# Patient Record
Sex: FEMALE | Race: WHITE | Employment: PART TIME | URBAN - METROPOLITAN AREA
[De-identification: names, ages, dates, MRNs, and addresses within clinical notes are randomized per-mention and may not be internally consistent; named-entity substitution may affect disease eponyms.]

---

## 2017-01-01 ENCOUNTER — DOCUMENTATION ONLY (OUTPATIENT)
Dept: CARE COORDINATION | Facility: CLINIC | Age: 72
End: 2017-01-01

## 2017-01-01 ENCOUNTER — ANTICOAGULATION THERAPY VISIT (OUTPATIENT)
Dept: ANTICOAGULATION | Facility: CLINIC | Age: 72
End: 2017-01-01

## 2017-01-01 ENCOUNTER — RADIANT APPOINTMENT (OUTPATIENT)
Dept: CARDIOLOGY | Facility: CLINIC | Age: 72
End: 2017-01-01

## 2017-01-01 ENCOUNTER — PRE VISIT (OUTPATIENT)
Dept: CARDIOLOGY | Facility: CLINIC | Age: 72
End: 2017-01-01

## 2017-01-01 ENCOUNTER — APPOINTMENT (OUTPATIENT)
Dept: GENERAL RADIOLOGY | Facility: CLINIC | Age: 72
DRG: 207 | End: 2017-01-01
Attending: FAMILY MEDICINE
Payer: MEDICARE

## 2017-01-01 ENCOUNTER — APPOINTMENT (OUTPATIENT)
Dept: GENERAL RADIOLOGY | Facility: CLINIC | Age: 72
DRG: 207 | End: 2017-01-01
Payer: MEDICARE

## 2017-01-01 ENCOUNTER — TELEPHONE (OUTPATIENT)
Dept: TRANSPLANT | Facility: CLINIC | Age: 72
End: 2017-01-01

## 2017-01-01 ENCOUNTER — RESULTS ONLY (OUTPATIENT)
Dept: OTHER | Facility: CLINIC | Age: 72
End: 2017-01-01

## 2017-01-01 ENCOUNTER — DOCUMENTATION ONLY (OUTPATIENT)
Dept: TRANSPLANT | Facility: CLINIC | Age: 72
End: 2017-01-01

## 2017-01-01 ENCOUNTER — APPOINTMENT (OUTPATIENT)
Dept: CT IMAGING | Facility: CLINIC | Age: 72
DRG: 308 | End: 2017-01-01
Attending: INTERNAL MEDICINE
Payer: MEDICARE

## 2017-01-01 ENCOUNTER — APPOINTMENT (OUTPATIENT)
Dept: ULTRASOUND IMAGING | Facility: CLINIC | Age: 72
DRG: 308 | End: 2017-01-01
Attending: PHYSICIAN ASSISTANT
Payer: MEDICARE

## 2017-01-01 ENCOUNTER — APPOINTMENT (OUTPATIENT)
Dept: CARDIOLOGY | Facility: CLINIC | Age: 72
End: 2017-01-01
Attending: INTERNAL MEDICINE
Payer: MEDICARE

## 2017-01-01 ENCOUNTER — HOSPITAL ENCOUNTER (OUTPATIENT)
Facility: CLINIC | Age: 72
Discharge: HOME | End: 2017-01-11
Attending: INTERNAL MEDICINE | Admitting: INTERNAL MEDICINE
Payer: MEDICARE

## 2017-01-01 ENCOUNTER — DOCUMENTATION ONLY (OUTPATIENT)
Dept: LAB | Facility: CLINIC | Age: 72
End: 2017-01-01

## 2017-01-01 ENCOUNTER — HOSPITAL ENCOUNTER (INPATIENT)
Facility: CLINIC | Age: 72
LOS: 3 days | Discharge: HOME | DRG: 308 | End: 2017-03-01
Attending: EMERGENCY MEDICINE | Admitting: HOSPITALIST
Payer: MEDICARE

## 2017-01-01 ENCOUNTER — ANESTHESIA EVENT (OUTPATIENT)
Dept: SURGERY | Facility: CLINIC | Age: 72
DRG: 308 | End: 2017-01-01
Payer: MEDICARE

## 2017-01-01 ENCOUNTER — ANESTHESIA EVENT (OUTPATIENT)
Dept: INTENSIVE CARE | Facility: CLINIC | Age: 72
DRG: 207 | End: 2017-01-01
Payer: MEDICARE

## 2017-01-01 ENCOUNTER — CARE COORDINATION (OUTPATIENT)
Dept: CARDIOLOGY | Facility: CLINIC | Age: 72
End: 2017-01-01

## 2017-01-01 ENCOUNTER — OFFICE VISIT (OUTPATIENT)
Dept: PULMONOLOGY | Facility: CLINIC | Age: 72
End: 2017-01-01
Attending: INTERNAL MEDICINE
Payer: MEDICARE

## 2017-01-01 ENCOUNTER — APPOINTMENT (OUTPATIENT)
Dept: CARDIOLOGY | Facility: CLINIC | Age: 72
DRG: 207 | End: 2017-01-01
Payer: MEDICARE

## 2017-01-01 ENCOUNTER — APPOINTMENT (OUTPATIENT)
Dept: MEDSURG UNIT | Facility: CLINIC | Age: 72
End: 2017-01-01
Attending: INTERNAL MEDICINE
Payer: MEDICARE

## 2017-01-01 ENCOUNTER — TELEPHONE (OUTPATIENT)
Dept: INTERNAL MEDICINE | Facility: CLINIC | Age: 72
End: 2017-01-01

## 2017-01-01 ENCOUNTER — CARE COORDINATION (OUTPATIENT)
Dept: PULMONOLOGY | Facility: CLINIC | Age: 72
End: 2017-01-01

## 2017-01-01 ENCOUNTER — ALLIED HEALTH/NURSE VISIT (OUTPATIENT)
Dept: NURSING | Facility: CLINIC | Age: 72
End: 2017-01-01
Payer: COMMERCIAL

## 2017-01-01 ENCOUNTER — HOSPITAL ENCOUNTER (INPATIENT)
Facility: CLINIC | Age: 72
LOS: 10 days | DRG: 207 | End: 2017-04-28
Attending: FAMILY MEDICINE | Admitting: INTERNAL MEDICINE
Payer: MEDICARE

## 2017-01-01 ENCOUNTER — APPOINTMENT (OUTPATIENT)
Dept: CARDIOLOGY | Facility: CLINIC | Age: 72
DRG: 308 | End: 2017-01-01
Payer: MEDICARE

## 2017-01-01 ENCOUNTER — APPOINTMENT (OUTPATIENT)
Dept: CARDIOLOGY | Facility: CLINIC | Age: 72
DRG: 308 | End: 2017-01-01
Attending: PHYSICIAN ASSISTANT
Payer: MEDICARE

## 2017-01-01 ENCOUNTER — TELEPHONE (OUTPATIENT)
Dept: NURSING | Facility: CLINIC | Age: 72
End: 2017-01-01

## 2017-01-01 ENCOUNTER — TELEPHONE (OUTPATIENT)
Dept: FAMILY MEDICINE | Facility: CLINIC | Age: 72
End: 2017-01-01

## 2017-01-01 ENCOUNTER — ANESTHESIA (OUTPATIENT)
Dept: INTENSIVE CARE | Facility: CLINIC | Age: 72
DRG: 207 | End: 2017-01-01
Payer: MEDICARE

## 2017-01-01 ENCOUNTER — APPOINTMENT (OUTPATIENT)
Dept: GENERAL RADIOLOGY | Facility: CLINIC | Age: 72
DRG: 308 | End: 2017-01-01
Attending: EMERGENCY MEDICINE
Payer: MEDICARE

## 2017-01-01 ENCOUNTER — APPOINTMENT (OUTPATIENT)
Dept: GENERAL RADIOLOGY | Facility: CLINIC | Age: 72
DRG: 207 | End: 2017-01-01
Attending: INTERNAL MEDICINE
Payer: MEDICARE

## 2017-01-01 ENCOUNTER — ANESTHESIA (OUTPATIENT)
Dept: SURGERY | Facility: CLINIC | Age: 72
DRG: 308 | End: 2017-01-01
Payer: MEDICARE

## 2017-01-01 VITALS
HEIGHT: 70 IN | OXYGEN SATURATION: 100 % | TEMPERATURE: 97.8 F | HEART RATE: 73 BPM | RESPIRATION RATE: 16 BRPM | DIASTOLIC BLOOD PRESSURE: 79 MMHG | BODY MASS INDEX: 28.77 KG/M2 | WEIGHT: 201 LBS | SYSTOLIC BLOOD PRESSURE: 120 MMHG

## 2017-01-01 VITALS
SYSTOLIC BLOOD PRESSURE: 97 MMHG | DIASTOLIC BLOOD PRESSURE: 68 MMHG | RESPIRATION RATE: 16 BRPM | WEIGHT: 198 LBS | OXYGEN SATURATION: 100 % | HEIGHT: 69 IN | HEART RATE: 80 BPM | BODY MASS INDEX: 29.33 KG/M2

## 2017-01-01 VITALS
WEIGHT: 200 LBS | OXYGEN SATURATION: 95 % | SYSTOLIC BLOOD PRESSURE: 87 MMHG | HEIGHT: 69 IN | BODY MASS INDEX: 29.62 KG/M2 | HEART RATE: 91 BPM | DIASTOLIC BLOOD PRESSURE: 61 MMHG

## 2017-01-01 VITALS
OXYGEN SATURATION: 97 % | BODY MASS INDEX: 31.03 KG/M2 | HEART RATE: 70 BPM | WEIGHT: 209.5 LBS | SYSTOLIC BLOOD PRESSURE: 94 MMHG | DIASTOLIC BLOOD PRESSURE: 58 MMHG | TEMPERATURE: 97.7 F | RESPIRATION RATE: 16 BRPM | HEIGHT: 69 IN

## 2017-01-01 VITALS
BODY MASS INDEX: 28.77 KG/M2 | DIASTOLIC BLOOD PRESSURE: 72 MMHG | OXYGEN SATURATION: 98 % | HEART RATE: 58 BPM | SYSTOLIC BLOOD PRESSURE: 116 MMHG | WEIGHT: 201 LBS | HEIGHT: 70 IN

## 2017-01-01 VITALS — HEART RATE: 118 BPM | OXYGEN SATURATION: 70 %

## 2017-01-01 VITALS
BODY MASS INDEX: 29.71 KG/M2 | DIASTOLIC BLOOD PRESSURE: 65 MMHG | SYSTOLIC BLOOD PRESSURE: 91 MMHG | HEIGHT: 69 IN | RESPIRATION RATE: 15 BRPM | TEMPERATURE: 99.3 F | WEIGHT: 200.62 LBS | OXYGEN SATURATION: 95 %

## 2017-01-01 DIAGNOSIS — Z79.01 LONG-TERM (CURRENT) USE OF ANTICOAGULANTS: ICD-10-CM

## 2017-01-01 DIAGNOSIS — I48.91 ATRIAL FIBRILLATION, UNSPECIFIED TYPE (H): ICD-10-CM

## 2017-01-01 DIAGNOSIS — I27.20 PULMONARY HYPERTENSION (H): ICD-10-CM

## 2017-01-01 DIAGNOSIS — Z79.01 LONG-TERM (CURRENT) USE OF ANTICOAGULANTS: Primary | ICD-10-CM

## 2017-01-01 DIAGNOSIS — I48.91 ATRIAL FIBRILLATION WITH RVR (H): ICD-10-CM

## 2017-01-01 DIAGNOSIS — J84.112 IPF (IDIOPATHIC PULMONARY FIBROSIS) (H): Primary | ICD-10-CM

## 2017-01-01 DIAGNOSIS — I48.0 PAROXYSMAL ATRIAL FIBRILLATION (H): ICD-10-CM

## 2017-01-01 DIAGNOSIS — I50.22 CHRONIC SYSTOLIC HEART FAILURE (H): Primary | ICD-10-CM

## 2017-01-01 DIAGNOSIS — I50.40 COMBINED SYSTOLIC AND DIASTOLIC HEART FAILURE (H): ICD-10-CM

## 2017-01-01 DIAGNOSIS — R09.3 INCREASED SPUTUM PRODUCTION: Primary | ICD-10-CM

## 2017-01-01 DIAGNOSIS — I50.22 CHRONIC SYSTOLIC HEART FAILURE (H): ICD-10-CM

## 2017-01-01 DIAGNOSIS — R09.02 OXYGEN DESATURATION: Primary | ICD-10-CM

## 2017-01-01 DIAGNOSIS — J84.112 IPF (IDIOPATHIC PULMONARY FIBROSIS) (H): ICD-10-CM

## 2017-01-01 DIAGNOSIS — F41.9 ANXIETY: ICD-10-CM

## 2017-01-01 DIAGNOSIS — J84.9 INTERSTITIAL PULMONARY DISEASE, UNSPECIFIED (H): Primary | ICD-10-CM

## 2017-01-01 DIAGNOSIS — Z79.01 ANTICOAGULANT LONG-TERM USE: Chronic | ICD-10-CM

## 2017-01-01 DIAGNOSIS — Z76.82 ORGAN TRANSPLANT CANDIDATE: ICD-10-CM

## 2017-01-01 DIAGNOSIS — Z76.82 AWAITING ORGAN TRANSPLANT STATUS: ICD-10-CM

## 2017-01-01 DIAGNOSIS — J96.01 ACUTE RESPIRATORY FAILURE WITH HYPOXIA (H): Primary | ICD-10-CM

## 2017-01-01 DIAGNOSIS — F41.9 ANXIETY: Primary | ICD-10-CM

## 2017-01-01 DIAGNOSIS — R09.02 HYPOXIA: ICD-10-CM

## 2017-01-01 DIAGNOSIS — I48.91 A-FIB (H): ICD-10-CM

## 2017-01-01 DIAGNOSIS — I48.91 ATRIAL FIBRILLATION, UNSPECIFIED TYPE (H): Primary | ICD-10-CM

## 2017-01-01 DIAGNOSIS — I48.0 PAROXYSMAL ATRIAL FIBRILLATION (H): Primary | ICD-10-CM

## 2017-01-01 DIAGNOSIS — I48.91 ATRIAL FIBRILLATION (H): ICD-10-CM

## 2017-01-01 DIAGNOSIS — R05.9 COUGH: ICD-10-CM

## 2017-01-01 DIAGNOSIS — I50.21 ACUTE SYSTOLIC CONGESTIVE HEART FAILURE (H): ICD-10-CM

## 2017-01-01 DIAGNOSIS — J84.10 PULMONARY FIBROSIS (H): Primary | ICD-10-CM

## 2017-01-01 DIAGNOSIS — I27.20 PULMONARY HYPERTENSION (H): Primary | ICD-10-CM

## 2017-01-01 DIAGNOSIS — R06.02 SOB (SHORTNESS OF BREATH): ICD-10-CM

## 2017-01-01 DIAGNOSIS — R05.9 COUGH: Primary | ICD-10-CM

## 2017-01-01 DIAGNOSIS — J96.90 RESPIRATORY FAILURE (H): ICD-10-CM

## 2017-01-01 DIAGNOSIS — J18.9 PNEUMONIA OF RIGHT LUNG DUE TO INFECTIOUS ORGANISM, UNSPECIFIED PART OF LUNG: ICD-10-CM

## 2017-01-01 DIAGNOSIS — Z99.81 DEPENDENCE ON SUPPLEMENTAL OXYGEN: ICD-10-CM

## 2017-01-01 LAB
ABO + RH BLD: NORMAL
ABO + RH BLD: NORMAL
ALBUMIN SERPL-MCNC: 2.4 G/DL (ref 3.4–5)
ALBUMIN SERPL-MCNC: 2.6 G/DL (ref 3.4–5)
ALBUMIN SERPL-MCNC: 3.1 G/DL (ref 3.4–5)
ALBUMIN SERPL-MCNC: 3.5 G/DL (ref 3.4–5)
ALBUMIN UR-MCNC: 30 MG/DL
ALBUMIN UR-MCNC: NEGATIVE MG/DL
ALP SERPL-CCNC: 55 U/L (ref 40–150)
ALP SERPL-CCNC: 55 U/L (ref 40–150)
ALP SERPL-CCNC: 63 U/L (ref 40–150)
ALP SERPL-CCNC: 73 U/L (ref 40–150)
ALT SERPL W P-5'-P-CCNC: 16 U/L (ref 0–50)
ALT SERPL W P-5'-P-CCNC: 27 U/L (ref 0–50)
ALT SERPL W P-5'-P-CCNC: 32 U/L (ref 0–50)
ALT SERPL W P-5'-P-CCNC: 36 U/L (ref 0–50)
ANION GAP SERPL CALCULATED.3IONS-SCNC: 10 MMOL/L (ref 3–14)
ANION GAP SERPL CALCULATED.3IONS-SCNC: 11 MMOL/L (ref 3–14)
ANION GAP SERPL CALCULATED.3IONS-SCNC: 4 MMOL/L (ref 3–14)
ANION GAP SERPL CALCULATED.3IONS-SCNC: 5 MMOL/L (ref 3–14)
ANION GAP SERPL CALCULATED.3IONS-SCNC: 6 MMOL/L (ref 3–14)
ANION GAP SERPL CALCULATED.3IONS-SCNC: 7 MMOL/L (ref 3–14)
ANION GAP SERPL CALCULATED.3IONS-SCNC: 8 MMOL/L (ref 3–14)
APPEARANCE UR: ABNORMAL
APPEARANCE UR: CLEAR
APTT PPP: 35 SEC (ref 22–37)
AST SERPL W P-5'-P-CCNC: 17 U/L (ref 0–45)
AST SERPL W P-5'-P-CCNC: 19 U/L (ref 0–45)
AST SERPL W P-5'-P-CCNC: 30 U/L (ref 0–45)
AST SERPL W P-5'-P-CCNC: 33 U/L (ref 0–45)
BACTERIA SPEC CULT: NO GROWTH
BACTERIA SPEC CULT: NORMAL
BASE DEFICIT BLDA-SCNC: 0.4 MMOL/L
BASE DEFICIT BLDA-SCNC: 1.6 MMOL/L
BASE DEFICIT BLDV-SCNC: 0.6 MMOL/L
BASE DEFICIT BLDV-SCNC: 1.8 MMOL/L
BASE EXCESS BLDA CALC-SCNC: 0.2 MMOL/L
BASE EXCESS BLDA CALC-SCNC: 0.9 MMOL/L
BASE EXCESS BLDA CALC-SCNC: 2.6 MMOL/L
BASE EXCESS BLDA CALC-SCNC: 3.8 MMOL/L
BASE EXCESS BLDA CALC-SCNC: 4.2 MMOL/L
BASE EXCESS BLDA CALC-SCNC: 5.2 MMOL/L
BASE EXCESS BLDA CALC-SCNC: 5.4 MMOL/L
BASE EXCESS BLDA CALC-SCNC: 5.5 MMOL/L
BASE EXCESS BLDA CALC-SCNC: 6.4 MMOL/L
BASE EXCESS BLDA CALC-SCNC: 6.7 MMOL/L
BASE EXCESS BLDA CALC-SCNC: 7.2 MMOL/L
BASE EXCESS BLDA CALC-SCNC: 7.3 MMOL/L
BASE EXCESS BLDA CALC-SCNC: 7.4 MMOL/L
BASE EXCESS BLDA CALC-SCNC: 9.2 MMOL/L
BASE EXCESS BLDV CALC-SCNC: 2.4 MMOL/L
BASE EXCESS BLDV CALC-SCNC: 4.6 MMOL/L
BASOPHILS # BLD AUTO: 0 10E9/L (ref 0–0.2)
BASOPHILS # BLD AUTO: 0.1 10E9/L (ref 0–0.2)
BASOPHILS NFR BLD AUTO: 0.3 %
BASOPHILS NFR BLD AUTO: 0.4 %
BILIRUB SERPL-MCNC: 0.3 MG/DL (ref 0.2–1.3)
BILIRUB SERPL-MCNC: 0.6 MG/DL (ref 0.2–1.3)
BILIRUB SERPL-MCNC: 1.1 MG/DL (ref 0.2–1.3)
BILIRUB SERPL-MCNC: 2 MG/DL (ref 0.2–1.3)
BILIRUB UR QL STRIP: NEGATIVE
BILIRUB UR QL STRIP: NEGATIVE
BLD GP AB SCN SERPL QL: NORMAL
BLOOD BANK CMNT PATIENT-IMP: NORMAL
BUN SERPL-MCNC: 10 MG/DL (ref 7–30)
BUN SERPL-MCNC: 10 MG/DL (ref 7–30)
BUN SERPL-MCNC: 11 MG/DL (ref 7–30)
BUN SERPL-MCNC: 12 MG/DL (ref 7–30)
BUN SERPL-MCNC: 13 MG/DL (ref 7–30)
BUN SERPL-MCNC: 14 MG/DL (ref 7–30)
BUN SERPL-MCNC: 16 MG/DL (ref 7–30)
BUN SERPL-MCNC: 18 MG/DL (ref 7–30)
BUN SERPL-MCNC: 19 MG/DL (ref 7–30)
BUN SERPL-MCNC: 19 MG/DL (ref 7–30)
BUN SERPL-MCNC: 21 MG/DL (ref 7–30)
BUN SERPL-MCNC: 7 MG/DL (ref 7–30)
BUN SERPL-MCNC: 8 MG/DL (ref 7–30)
BUN SERPL-MCNC: 9 MG/DL (ref 7–30)
BUN SERPL-MCNC: 9 MG/DL (ref 7–30)
CA-I BLD-MCNC: 4.3 MG/DL (ref 4.4–5.2)
CALCIUM SERPL-MCNC: 7.4 MG/DL (ref 8.5–10.1)
CALCIUM SERPL-MCNC: 7.6 MG/DL (ref 8.5–10.1)
CALCIUM SERPL-MCNC: 7.6 MG/DL (ref 8.5–10.1)
CALCIUM SERPL-MCNC: 7.7 MG/DL (ref 8.5–10.1)
CALCIUM SERPL-MCNC: 7.7 MG/DL (ref 8.5–10.1)
CALCIUM SERPL-MCNC: 7.8 MG/DL (ref 8.5–10.1)
CALCIUM SERPL-MCNC: 7.9 MG/DL (ref 8.5–10.1)
CALCIUM SERPL-MCNC: 8 MG/DL (ref 8.5–10.1)
CALCIUM SERPL-MCNC: 8 MG/DL (ref 8.5–10.1)
CALCIUM SERPL-MCNC: 8.1 MG/DL (ref 8.5–10.1)
CALCIUM SERPL-MCNC: 8.2 MG/DL (ref 8.5–10.1)
CALCIUM SERPL-MCNC: 8.5 MG/DL (ref 8.5–10.1)
CALCIUM SERPL-MCNC: 8.6 MG/DL (ref 8.5–10.1)
CALCIUM SERPL-MCNC: 8.9 MG/DL (ref 8.5–10.1)
CALCIUM SERPL-MCNC: 8.9 MG/DL (ref 8.5–10.1)
CALCIUM SERPL-MCNC: 9.2 MG/DL (ref 8.5–10.1)
CHLORIDE SERPL-SCNC: 101 MMOL/L (ref 94–109)
CHLORIDE SERPL-SCNC: 102 MMOL/L (ref 94–109)
CHLORIDE SERPL-SCNC: 102 MMOL/L (ref 94–109)
CHLORIDE SERPL-SCNC: 103 MMOL/L (ref 94–109)
CHLORIDE SERPL-SCNC: 103 MMOL/L (ref 94–109)
CHLORIDE SERPL-SCNC: 104 MMOL/L (ref 94–109)
CHLORIDE SERPL-SCNC: 105 MMOL/L (ref 94–109)
CHLORIDE SERPL-SCNC: 106 MMOL/L (ref 94–109)
CHLORIDE SERPL-SCNC: 107 MMOL/L (ref 94–109)
CHLORIDE SERPL-SCNC: 107 MMOL/L (ref 94–109)
CHLORIDE SERPL-SCNC: 108 MMOL/L (ref 94–109)
CHLORIDE SERPL-SCNC: 109 MMOL/L (ref 94–109)
CHLORIDE SERPL-SCNC: 111 MMOL/L (ref 94–109)
CHOLEST SERPL-MCNC: 158 MG/DL
CO2 BLDCOV-SCNC: 29 MMOL/L (ref 21–28)
CO2 SERPL-SCNC: 23 MMOL/L (ref 20–32)
CO2 SERPL-SCNC: 25 MMOL/L (ref 20–32)
CO2 SERPL-SCNC: 26 MMOL/L (ref 20–32)
CO2 SERPL-SCNC: 26 MMOL/L (ref 20–32)
CO2 SERPL-SCNC: 28 MMOL/L (ref 20–32)
CO2 SERPL-SCNC: 29 MMOL/L (ref 20–32)
CO2 SERPL-SCNC: 30 MMOL/L (ref 20–32)
CO2 SERPL-SCNC: 30 MMOL/L (ref 20–32)
CO2 SERPL-SCNC: 31 MMOL/L (ref 20–32)
CO2 SERPL-SCNC: 31 MMOL/L (ref 20–32)
CO2 SERPL-SCNC: 32 MMOL/L (ref 20–32)
CO2 SERPL-SCNC: 33 MMOL/L (ref 20–32)
CO2 SERPL-SCNC: 33 MMOL/L (ref 20–32)
COLOR UR AUTO: ABNORMAL
COLOR UR AUTO: YELLOW
CREAT SERPL-MCNC: 0.58 MG/DL (ref 0.52–1.04)
CREAT SERPL-MCNC: 0.59 MG/DL (ref 0.52–1.04)
CREAT SERPL-MCNC: 0.66 MG/DL (ref 0.52–1.04)
CREAT SERPL-MCNC: 0.67 MG/DL (ref 0.52–1.04)
CREAT SERPL-MCNC: 0.74 MG/DL (ref 0.52–1.04)
CREAT SERPL-MCNC: 0.76 MG/DL (ref 0.52–1.04)
CREAT SERPL-MCNC: 0.8 MG/DL (ref 0.52–1.04)
CREAT SERPL-MCNC: 0.83 MG/DL (ref 0.52–1.04)
CREAT SERPL-MCNC: 0.86 MG/DL (ref 0.52–1.04)
CREAT SERPL-MCNC: 0.95 MG/DL (ref 0.52–1.04)
CREAT SERPL-MCNC: 0.95 MG/DL (ref 0.52–1.04)
CREAT SERPL-MCNC: 0.96 MG/DL (ref 0.52–1.04)
CREAT SERPL-MCNC: 0.96 MG/DL (ref 0.52–1.04)
CREAT SERPL-MCNC: 1 MG/DL (ref 0.52–1.04)
CREAT SERPL-MCNC: 1.04 MG/DL (ref 0.52–1.04)
CREAT SERPL-MCNC: 1.06 MG/DL (ref 0.52–1.04)
CREAT SERPL-MCNC: 1.06 MG/DL (ref 0.52–1.04)
DIFFERENTIAL METHOD BLD: ABNORMAL
DIFFERENTIAL METHOD BLD: NORMAL
DLCOUNC-%PRED-PRE: 30 %
DLCOUNC-PRE: 6.72 ML/MIN/MMHG
DLCOUNC-PRED: 22.34 ML/MIN/MMHG
EOSINOPHIL # BLD AUTO: 0.2 10E9/L (ref 0–0.7)
EOSINOPHIL # BLD AUTO: 0.2 10E9/L (ref 0–0.7)
EOSINOPHIL NFR BLD AUTO: 2.1 %
EOSINOPHIL NFR BLD AUTO: 2.8 %
ERV-%PRED-PRE: 87 %
ERV-PRE: 0.58 L
ERV-PRED: 0.66 L
ERYTHROCYTE [DISTWIDTH] IN BLOOD BY AUTOMATED COUNT: 12.8 % (ref 10–15)
ERYTHROCYTE [DISTWIDTH] IN BLOOD BY AUTOMATED COUNT: 13.4 % (ref 10–15)
ERYTHROCYTE [DISTWIDTH] IN BLOOD BY AUTOMATED COUNT: 13.5 % (ref 10–15)
ERYTHROCYTE [DISTWIDTH] IN BLOOD BY AUTOMATED COUNT: 13.5 % (ref 10–15)
ERYTHROCYTE [DISTWIDTH] IN BLOOD BY AUTOMATED COUNT: 13.6 % (ref 10–15)
ERYTHROCYTE [DISTWIDTH] IN BLOOD BY AUTOMATED COUNT: 13.7 % (ref 10–15)
ERYTHROCYTE [DISTWIDTH] IN BLOOD BY AUTOMATED COUNT: 14 % (ref 10–15)
ERYTHROCYTE [DISTWIDTH] IN BLOOD BY AUTOMATED COUNT: 14 % (ref 10–15)
ERYTHROCYTE [DISTWIDTH] IN BLOOD BY AUTOMATED COUNT: 14.1 % (ref 10–15)
ERYTHROCYTE [DISTWIDTH] IN BLOOD BY AUTOMATED COUNT: 14.1 % (ref 10–15)
ERYTHROCYTE [DISTWIDTH] IN BLOOD BY AUTOMATED COUNT: 14.2 % (ref 10–15)
ERYTHROCYTE [DISTWIDTH] IN BLOOD BY AUTOMATED COUNT: 14.4 % (ref 10–15)
EXPTIME-PRE: 5.06 SEC
FEF2575-%PRED-PRE: 155 %
FEF2575-PRE: 3.24 L/SEC
FEF2575-PRED: 2.08 L/SEC
FEFMAX-%PRED-PRE: 110 %
FEFMAX-PRE: 7.09 L/SEC
FEFMAX-PRED: 6.39 L/SEC
FEV1-%PRED-PRE: 85 %
FEV1-PRE: 2.19 L
FEV1FEV6-PRE: 90 %
FEV1FEV6-PRED: 79 %
FEV1FVC-PRE: 90 %
FEV1FVC-PRED: 78 %
FEV1SVC-PRE: 92 %
FEV1SVC-PRED: 71 %
FIFMAX-PRE: 4.97 L/SEC
FLUAV H1 2009 PAND RNA SPEC QL NAA+PROBE: NEGATIVE
FLUAV H1 RNA SPEC QL NAA+PROBE: NEGATIVE
FLUAV H3 RNA SPEC QL NAA+PROBE: NEGATIVE
FLUAV RNA SPEC QL NAA+PROBE: NEGATIVE
FLUAV+FLUBV AG SPEC QL: NEGATIVE
FLUAV+FLUBV AG SPEC QL: NORMAL
FLUBV RNA SPEC QL NAA+PROBE: NEGATIVE
FRCPLETH-%PRED-PRE: 68 %
FRCPLETH-PRE: 2.02 L
FRCPLETH-PRED: 2.95 L
FVC-%PRED-PRE: 73 %
FVC-PRE: 2.44 L
FVC-PRED: 3.33 L
GFR SERPL CREATININE-BSD FRML MDRD: 51 ML/MIN/1.7M2
GFR SERPL CREATININE-BSD FRML MDRD: 51 ML/MIN/1.7M2
GFR SERPL CREATININE-BSD FRML MDRD: 53 ML/MIN/1.7M2
GFR SERPL CREATININE-BSD FRML MDRD: 55 ML/MIN/1.7M2
GFR SERPL CREATININE-BSD FRML MDRD: 57 ML/MIN/1.7M2
GFR SERPL CREATININE-BSD FRML MDRD: 57 ML/MIN/1.7M2
GFR SERPL CREATININE-BSD FRML MDRD: 58 ML/MIN/1.7M2
GFR SERPL CREATININE-BSD FRML MDRD: 58 ML/MIN/1.7M2
GFR SERPL CREATININE-BSD FRML MDRD: 65 ML/MIN/1.7M2
GFR SERPL CREATININE-BSD FRML MDRD: 68 ML/MIN/1.7M2
GFR SERPL CREATININE-BSD FRML MDRD: 71 ML/MIN/1.7M2
GFR SERPL CREATININE-BSD FRML MDRD: 75 ML/MIN/1.7M2
GFR SERPL CREATININE-BSD FRML MDRD: 78 ML/MIN/1.7M2
GFR SERPL CREATININE-BSD FRML MDRD: 87 ML/MIN/1.7M2
GFR SERPL CREATININE-BSD FRML MDRD: 89 ML/MIN/1.7M2
GFR SERPL CREATININE-BSD FRML MDRD: ABNORMAL ML/MIN/1.7M2
GFR SERPL CREATININE-BSD FRML MDRD: ABNORMAL ML/MIN/1.7M2
GLUCOSE BLDC GLUCOMTR-MCNC: 110 MG/DL (ref 70–99)
GLUCOSE BLDC GLUCOMTR-MCNC: 111 MG/DL (ref 70–99)
GLUCOSE BLDC GLUCOMTR-MCNC: 144 MG/DL (ref 70–99)
GLUCOSE BLDC GLUCOMTR-MCNC: 90 MG/DL (ref 70–99)
GLUCOSE BLDC GLUCOMTR-MCNC: 96 MG/DL (ref 70–99)
GLUCOSE SERPL-MCNC: 100 MG/DL (ref 70–99)
GLUCOSE SERPL-MCNC: 100 MG/DL (ref 70–99)
GLUCOSE SERPL-MCNC: 106 MG/DL (ref 70–99)
GLUCOSE SERPL-MCNC: 110 MG/DL (ref 70–99)
GLUCOSE SERPL-MCNC: 120 MG/DL (ref 70–99)
GLUCOSE SERPL-MCNC: 121 MG/DL (ref 70–99)
GLUCOSE SERPL-MCNC: 134 MG/DL (ref 70–99)
GLUCOSE SERPL-MCNC: 135 MG/DL (ref 70–99)
GLUCOSE SERPL-MCNC: 145 MG/DL (ref 70–99)
GLUCOSE SERPL-MCNC: 161 MG/DL (ref 70–99)
GLUCOSE SERPL-MCNC: 181 MG/DL (ref 70–99)
GLUCOSE SERPL-MCNC: 84 MG/DL (ref 70–99)
GLUCOSE SERPL-MCNC: 89 MG/DL (ref 70–99)
GLUCOSE SERPL-MCNC: 92 MG/DL (ref 70–99)
GLUCOSE SERPL-MCNC: 94 MG/DL (ref 70–99)
GLUCOSE SERPL-MCNC: 94 MG/DL (ref 70–99)
GLUCOSE SERPL-MCNC: 96 MG/DL (ref 70–99)
GLUCOSE SERPL-MCNC: 97 MG/DL (ref 70–99)
GLUCOSE SERPL-MCNC: 99 MG/DL (ref 70–99)
GLUCOSE UR STRIP-MCNC: NEGATIVE MG/DL
GLUCOSE UR STRIP-MCNC: NEGATIVE MG/DL
HADV DNA SPEC QL NAA+PROBE: NEGATIVE
HADV DNA SPEC QL NAA+PROBE: NEGATIVE
HCO3 BLD-SCNC: 23 MMOL/L (ref 21–28)
HCO3 BLD-SCNC: 24 MMOL/L (ref 21–28)
HCO3 BLD-SCNC: 25 MMOL/L (ref 21–28)
HCO3 BLD-SCNC: 26 MMOL/L (ref 21–28)
HCO3 BLD-SCNC: 27 MMOL/L (ref 21–28)
HCO3 BLD-SCNC: 29 MMOL/L (ref 21–28)
HCO3 BLD-SCNC: 30 MMOL/L (ref 21–28)
HCO3 BLD-SCNC: 32 MMOL/L (ref 21–28)
HCO3 BLD-SCNC: 33 MMOL/L (ref 21–28)
HCO3 BLD-SCNC: 33 MMOL/L (ref 21–28)
HCO3 BLD-SCNC: 34 MMOL/L (ref 21–28)
HCO3 BLDV-SCNC: 22 MMOL/L (ref 21–28)
HCO3 BLDV-SCNC: 25 MMOL/L (ref 21–28)
HCO3 BLDV-SCNC: 27 MMOL/L (ref 21–28)
HCO3 BLDV-SCNC: 30 MMOL/L (ref 21–28)
HCT VFR BLD AUTO: 32.4 % (ref 35–47)
HCT VFR BLD AUTO: 35.6 % (ref 35–47)
HCT VFR BLD AUTO: 36.1 % (ref 35–47)
HCT VFR BLD AUTO: 36.4 % (ref 35–47)
HCT VFR BLD AUTO: 36.5 % (ref 35–47)
HCT VFR BLD AUTO: 36.9 % (ref 35–47)
HCT VFR BLD AUTO: 37 % (ref 35–47)
HCT VFR BLD AUTO: 37.2 % (ref 35–47)
HCT VFR BLD AUTO: 37.5 % (ref 35–47)
HCT VFR BLD AUTO: 39.7 % (ref 35–47)
HCT VFR BLD AUTO: 40.3 % (ref 35–47)
HCT VFR BLD AUTO: 40.8 % (ref 35–47)
HCT VFR BLD AUTO: 42.9 % (ref 35–47)
HCT VFR BLD AUTO: 49.5 % (ref 35–47)
HDLC SERPL-MCNC: 43 MG/DL
HGB BLD-MCNC: 10.4 G/DL (ref 11.7–15.7)
HGB BLD-MCNC: 11.4 G/DL (ref 11.7–15.7)
HGB BLD-MCNC: 11.5 G/DL (ref 11.7–15.7)
HGB BLD-MCNC: 11.6 G/DL (ref 11.7–15.7)
HGB BLD-MCNC: 11.8 G/DL (ref 11.7–15.7)
HGB BLD-MCNC: 11.8 G/DL (ref 11.7–15.7)
HGB BLD-MCNC: 11.9 G/DL (ref 11.7–15.7)
HGB BLD-MCNC: 12.2 G/DL (ref 11.7–15.7)
HGB BLD-MCNC: 12.2 G/DL (ref 11.7–15.7)
HGB BLD-MCNC: 13 G/DL (ref 11.7–15.7)
HGB BLD-MCNC: 13.2 G/DL (ref 11.7–15.7)
HGB BLD-MCNC: 13.9 G/DL (ref 11.7–15.7)
HGB BLD-MCNC: 14.2 G/DL (ref 11.7–15.7)
HGB BLD-MCNC: 16.9 G/DL (ref 11.7–15.7)
HGB UR QL STRIP: NEGATIVE
HGB UR QL STRIP: NEGATIVE
HMPV RNA SPEC QL NAA+PROBE: NEGATIVE
HPIV1 RNA SPEC QL NAA+PROBE: NEGATIVE
HPIV2 RNA SPEC QL NAA+PROBE: NEGATIVE
HPIV3 RNA SPEC QL NAA+PROBE: NEGATIVE
IC-%PRED-PRE: 60 %
IC-PRE: 1.8 L
IC-PRED: 2.96 L
IMM GRANULOCYTES # BLD: 0 10E9/L (ref 0–0.4)
IMM GRANULOCYTES # BLD: 0 10E9/L (ref 0–0.4)
IMM GRANULOCYTES NFR BLD: 0.1 %
IMM GRANULOCYTES NFR BLD: 0.2 %
INR BLD: 1.3 (ref 0.86–1.14)
INR PPP: 1.31 (ref 0.86–1.14)
INR PPP: 1.33 (ref 0.86–1.14)
INR PPP: 1.41 (ref 0.86–1.14)
INR PPP: 1.41 (ref 0.86–1.14)
INR PPP: 1.42 (ref 0.86–1.14)
INR PPP: 1.42 (ref 0.86–1.14)
INR PPP: 1.55 (ref 0.86–1.14)
INR PPP: 1.68 (ref 0.86–1.14)
INR PPP: 1.95 (ref 0.86–1.14)
INR PPP: 2 (ref 0.86–1.14)
INR PPP: 2.1 (ref 0.86–1.14)
INR PPP: 2.25 (ref 0.86–1.14)
INR PPP: 2.35 (ref 0.86–1.14)
INR PPP: 2.4
INR PPP: 2.4 (ref 0.86–1.14)
INR PPP: 2.41 (ref 0.86–1.14)
INR PPP: 2.56 (ref 0.86–1.14)
INR PPP: 2.6 (ref 0.86–1.14)
INR PPP: 2.69 (ref 0.86–1.14)
INR PPP: 2.76 (ref 0.86–1.14)
INR PPP: 2.78 (ref 0.86–1.14)
INR PPP: 2.81 (ref 0.86–1.14)
INR PPP: 3
INR PPP: 3.13 (ref 0.86–1.14)
INR PPP: 3.46 (ref 0.86–1.14)
INR PPP: 4.7
INTERPRETATION ECG - MUSE: NORMAL
KETONES UR STRIP-MCNC: 5 MG/DL
KETONES UR STRIP-MCNC: NEGATIVE MG/DL
L PNEUMO1 AG UR QL IA: NORMAL
LACTATE BLD-SCNC: 1.1 MMOL/L (ref 0.7–2.1)
LACTATE BLD-SCNC: 1.3 MMOL/L (ref 0.7–2.1)
LACTATE BLD-SCNC: 1.8 MMOL/L (ref 0.7–2.1)
LACTATE BLD-SCNC: 3.3 MMOL/L (ref 0.7–2.1)
LDLC SERPL CALC-MCNC: 102 MG/DL
LEUKOCYTE ESTERASE UR QL STRIP: NEGATIVE
LEUKOCYTE ESTERASE UR QL STRIP: NEGATIVE
LYMPHOCYTES # BLD AUTO: 1 10E9/L (ref 0.8–5.3)
LYMPHOCYTES # BLD AUTO: 1.3 10E9/L (ref 0.8–5.3)
LYMPHOCYTES NFR BLD AUTO: 15.3 %
LYMPHOCYTES NFR BLD AUTO: 8.9 %
MAGNESIUM SERPL-MCNC: 1.7 MG/DL (ref 1.6–2.3)
MAGNESIUM SERPL-MCNC: 1.8 MG/DL (ref 1.6–2.3)
MAGNESIUM SERPL-MCNC: 1.8 MG/DL (ref 1.6–2.3)
MAGNESIUM SERPL-MCNC: 1.9 MG/DL (ref 1.6–2.3)
MAGNESIUM SERPL-MCNC: 1.9 MG/DL (ref 1.6–2.3)
MAGNESIUM SERPL-MCNC: 2 MG/DL (ref 1.6–2.3)
MAGNESIUM SERPL-MCNC: 2.1 MG/DL (ref 1.6–2.3)
MAGNESIUM SERPL-MCNC: 2.3 MG/DL (ref 1.6–2.3)
MAGNESIUM SERPL-MCNC: 2.3 MG/DL (ref 1.6–2.3)
MAGNESIUM SERPL-MCNC: 2.4 MG/DL (ref 1.6–2.3)
MCH RBC QN AUTO: 30.5 PG (ref 26.5–33)
MCH RBC QN AUTO: 30.6 PG (ref 26.5–33)
MCH RBC QN AUTO: 30.7 PG (ref 26.5–33)
MCH RBC QN AUTO: 30.8 PG (ref 26.5–33)
MCH RBC QN AUTO: 31.1 PG (ref 26.5–33)
MCH RBC QN AUTO: 31.2 PG (ref 26.5–33)
MCH RBC QN AUTO: 31.2 PG (ref 26.5–33)
MCH RBC QN AUTO: 31.3 PG (ref 26.5–33)
MCH RBC QN AUTO: 31.3 PG (ref 26.5–33)
MCH RBC QN AUTO: 31.4 PG (ref 26.5–33)
MCH RBC QN AUTO: 31.5 PG (ref 26.5–33)
MCH RBC QN AUTO: 31.7 PG (ref 26.5–33)
MCH RBC QN AUTO: 31.9 PG (ref 26.5–33)
MCH RBC QN AUTO: 32.6 PG (ref 26.5–33)
MCHC RBC AUTO-ENTMCNC: 31.1 G/DL (ref 31.5–36.5)
MCHC RBC AUTO-ENTMCNC: 31.9 G/DL (ref 31.5–36.5)
MCHC RBC AUTO-ENTMCNC: 32 G/DL (ref 31.5–36.5)
MCHC RBC AUTO-ENTMCNC: 32.1 G/DL (ref 31.5–36.5)
MCHC RBC AUTO-ENTMCNC: 32.2 G/DL (ref 31.5–36.5)
MCHC RBC AUTO-ENTMCNC: 32.3 G/DL (ref 31.5–36.5)
MCHC RBC AUTO-ENTMCNC: 32.5 G/DL (ref 31.5–36.5)
MCHC RBC AUTO-ENTMCNC: 32.7 G/DL (ref 31.5–36.5)
MCHC RBC AUTO-ENTMCNC: 32.7 G/DL (ref 31.5–36.5)
MCHC RBC AUTO-ENTMCNC: 32.8 G/DL (ref 31.5–36.5)
MCHC RBC AUTO-ENTMCNC: 32.8 G/DL (ref 31.5–36.5)
MCHC RBC AUTO-ENTMCNC: 33.1 G/DL (ref 31.5–36.5)
MCHC RBC AUTO-ENTMCNC: 34.1 G/DL (ref 31.5–36.5)
MCHC RBC AUTO-ENTMCNC: 34.1 G/DL (ref 31.5–36.5)
MCV RBC AUTO: 93 FL (ref 78–100)
MCV RBC AUTO: 94 FL (ref 78–100)
MCV RBC AUTO: 95 FL (ref 78–100)
MCV RBC AUTO: 96 FL (ref 78–100)
MCV RBC AUTO: 97 FL (ref 78–100)
MCV RBC AUTO: 98 FL (ref 78–100)
MCV RBC AUTO: 99 FL (ref 78–100)
MICRO REPORT STATUS: NORMAL
MICROBIOLOGIST REVIEW: NORMAL
MONOCYTES # BLD AUTO: 1.1 10E9/L (ref 0–1.3)
MONOCYTES # BLD AUTO: 1.1 10E9/L (ref 0–1.3)
MONOCYTES NFR BLD AUTO: 12.3 %
MONOCYTES NFR BLD AUTO: 9.9 %
MRSA DNA SPEC QL NAA+PROBE: NORMAL
MUCOUS THREADS #/AREA URNS LPF: PRESENT /LPF
MUCOUS THREADS #/AREA URNS LPF: PRESENT /LPF
NEUTROPHILS # BLD AUTO: 5.9 10E9/L (ref 1.6–8.3)
NEUTROPHILS # BLD AUTO: 9.1 10E9/L (ref 1.6–8.3)
NEUTROPHILS NFR BLD AUTO: 69.2 %
NEUTROPHILS NFR BLD AUTO: 78.5 %
NITRATE UR QL: NEGATIVE
NITRATE UR QL: NEGATIVE
NONHDLC SERPL-MCNC: 115 MG/DL
NRBC # BLD AUTO: 0 10*3/UL
NRBC # BLD AUTO: 0 10*3/UL
NRBC BLD AUTO-RTO: 0 /100
NRBC BLD AUTO-RTO: 0 /100
NT-PROBNP SERPL-MCNC: 2976 PG/ML (ref 0–125)
NT-PROBNP SERPL-MCNC: 447 PG/ML (ref 0–125)
NT-PROBNP SERPL-MCNC: 6471 PG/ML (ref 0–900)
NT-PROBNP SERPL-MCNC: 7295 PG/ML (ref 0–900)
O2/TOTAL GAS SETTING VFR VENT: 100 %
O2/TOTAL GAS SETTING VFR VENT: 21 %
O2/TOTAL GAS SETTING VFR VENT: 60 %
O2/TOTAL GAS SETTING VFR VENT: 65 %
O2/TOTAL GAS SETTING VFR VENT: 70 %
O2/TOTAL GAS SETTING VFR VENT: 75 %
O2/TOTAL GAS SETTING VFR VENT: 80 %
O2/TOTAL GAS SETTING VFR VENT: 80 %
O2/TOTAL GAS SETTING VFR VENT: 85 %
O2/TOTAL GAS SETTING VFR VENT: 90 %
O2/TOTAL GAS SETTING VFR VENT: ABNORMAL %
O2/TOTAL GAS SETTING VFR VENT: NORMAL %
OXYHGB MFR BLD: 87 % (ref 92–100)
OXYHGB MFR BLD: 88 % (ref 92–100)
OXYHGB MFR BLD: 88 % (ref 92–100)
OXYHGB MFR BLD: 91 % (ref 92–100)
OXYHGB MFR BLD: 92 % (ref 92–100)
OXYHGB MFR BLD: 92 % (ref 92–100)
OXYHGB MFR BLD: 94 % (ref 92–100)
OXYHGB MFR BLD: 96 % (ref 92–100)
OXYHGB MFR BLD: 96 % (ref 92–100)
PCO2 BLD: 35 MM HG (ref 35–45)
PCO2 BLD: 37 MM HG (ref 35–45)
PCO2 BLD: 40 MM HG (ref 35–45)
PCO2 BLD: 42 MM HG (ref 35–45)
PCO2 BLD: 44 MM HG (ref 35–45)
PCO2 BLD: 45 MM HG (ref 35–45)
PCO2 BLD: 45 MM HG (ref 35–45)
PCO2 BLD: 46 MM HG (ref 35–45)
PCO2 BLD: 46 MM HG (ref 35–45)
PCO2 BLD: 47 MM HG (ref 35–45)
PCO2 BLD: 48 MM HG (ref 35–45)
PCO2 BLD: 53 MM HG (ref 35–45)
PCO2 BLD: 54 MM HG (ref 35–45)
PCO2 BLD: 59 MM HG (ref 35–45)
PCO2 BLDV: 35 MM HG (ref 40–50)
PCO2 BLDV: 42 MM HG (ref 40–50)
PCO2 BLDV: 44 MM HG (ref 40–50)
PH BLD: 7.36 PH (ref 7.35–7.45)
PH BLD: 7.4 PH (ref 7.35–7.45)
PH BLD: 7.42 PH (ref 7.35–7.45)
PH BLD: 7.42 PH (ref 7.35–7.45)
PH BLD: 7.43 PH (ref 7.35–7.45)
PH BLD: 7.45 PH (ref 7.35–7.45)
PH BLD: 7.46 PH (ref 7.35–7.45)
PH BLD: 7.47 PH (ref 7.35–7.45)
PH BLD: 7.47 PH (ref 7.35–7.45)
PH BLD: 7.48 PH (ref 7.35–7.45)
PH BLDV: 7.36 PH (ref 7.32–7.43)
PH BLDV: 7.42 PH (ref 7.32–7.43)
PH BLDV: 7.43 PH (ref 7.32–7.43)
PH UR STRIP: 5.5 PH (ref 5–7)
PH UR STRIP: 6 PH (ref 5–7)
PHOSPHATE SERPL-MCNC: 2 MG/DL (ref 2.5–4.5)
PHOSPHATE SERPL-MCNC: 2.4 MG/DL (ref 2.5–4.5)
PHOSPHATE SERPL-MCNC: 2.4 MG/DL (ref 2.5–4.5)
PHOSPHATE SERPL-MCNC: 2.5 MG/DL (ref 2.5–4.5)
PHOSPHATE SERPL-MCNC: 2.7 MG/DL (ref 2.5–4.5)
PHOSPHATE SERPL-MCNC: 2.8 MG/DL (ref 2.5–4.5)
PHOSPHATE SERPL-MCNC: 3.5 MG/DL (ref 2.5–4.5)
PLATELET # BLD AUTO: 161 10E9/L (ref 150–450)
PLATELET # BLD AUTO: 166 10E9/L (ref 150–450)
PLATELET # BLD AUTO: 168 10E9/L (ref 150–450)
PLATELET # BLD AUTO: 181 10E9/L (ref 150–450)
PLATELET # BLD AUTO: 188 10E9/L (ref 150–450)
PLATELET # BLD AUTO: 194 10E9/L (ref 150–450)
PLATELET # BLD AUTO: 212 10E9/L (ref 150–450)
PLATELET # BLD AUTO: 218 10E9/L (ref 150–450)
PLATELET # BLD AUTO: 230 10E9/L (ref 150–450)
PLATELET # BLD AUTO: 235 10E9/L (ref 150–450)
PLATELET # BLD AUTO: 243 10E9/L (ref 150–450)
PLATELET # BLD AUTO: 244 10E9/L (ref 150–450)
PLATELET # BLD AUTO: 246 10E9/L (ref 150–450)
PLATELET # BLD AUTO: 263 10E9/L (ref 150–450)
PO2 BLD: 103 MM HG (ref 80–105)
PO2 BLD: 104 MM HG (ref 80–105)
PO2 BLD: 45 MM HG (ref 80–105)
PO2 BLD: 54 MM HG (ref 80–105)
PO2 BLD: 56 MM HG (ref 80–105)
PO2 BLD: 57 MM HG (ref 80–105)
PO2 BLD: 59 MM HG (ref 80–105)
PO2 BLD: 62 MM HG (ref 80–105)
PO2 BLD: 63 MM HG (ref 80–105)
PO2 BLD: 64 MM HG (ref 80–105)
PO2 BLD: 66 MM HG (ref 80–105)
PO2 BLD: 72 MM HG (ref 80–105)
PO2 BLD: 74 MM HG (ref 80–105)
PO2 BLD: 74 MM HG (ref 80–105)
PO2 BLD: 76 MM HG (ref 80–105)
PO2 BLD: 77 MM HG (ref 80–105)
PO2 BLDV: 20 MM HG (ref 25–47)
PO2 BLDV: 38 MM HG (ref 25–47)
PO2 BLDV: 43 MM HG (ref 25–47)
PO2 BLDV: 70 MM HG (ref 25–47)
PO2 BLDV: 74 MM HG (ref 25–47)
POTASSIUM SERPL-SCNC: 3.7 MMOL/L (ref 3.4–5.3)
POTASSIUM SERPL-SCNC: 3.8 MMOL/L (ref 3.4–5.3)
POTASSIUM SERPL-SCNC: 3.9 MMOL/L (ref 3.4–5.3)
POTASSIUM SERPL-SCNC: 4 MMOL/L (ref 3.4–5.3)
POTASSIUM SERPL-SCNC: 4.1 MMOL/L (ref 3.4–5.3)
POTASSIUM SERPL-SCNC: 4.2 MMOL/L (ref 3.4–5.3)
POTASSIUM SERPL-SCNC: 4.5 MMOL/L (ref 3.4–5.3)
POTASSIUM SERPL-SCNC: 4.5 MMOL/L (ref 3.4–5.3)
PRA DONOR SPECIFIC ABY: NORMAL
PROCALCITONIN SERPL-MCNC: NORMAL NG/ML
PROT SERPL-MCNC: 6.3 G/DL (ref 6.8–8.8)
PROT SERPL-MCNC: 6.4 G/DL (ref 6.8–8.8)
PROT SERPL-MCNC: 7.6 G/DL (ref 6.8–8.8)
PROT SERPL-MCNC: 9 G/DL (ref 6.8–8.8)
RBC # BLD AUTO: 3.3 10E12/L (ref 3.8–5.2)
RBC # BLD AUTO: 3.65 10E12/L (ref 3.8–5.2)
RBC # BLD AUTO: 3.7 10E12/L (ref 3.8–5.2)
RBC # BLD AUTO: 3.74 10E12/L (ref 3.8–5.2)
RBC # BLD AUTO: 3.8 10E12/L (ref 3.8–5.2)
RBC # BLD AUTO: 3.82 10E12/L (ref 3.8–5.2)
RBC # BLD AUTO: 3.85 10E12/L (ref 3.8–5.2)
RBC # BLD AUTO: 3.89 10E12/L (ref 3.8–5.2)
RBC # BLD AUTO: 3.9 10E12/L (ref 3.8–5.2)
RBC # BLD AUTO: 4.22 10E12/L (ref 3.8–5.2)
RBC # BLD AUTO: 4.24 10E12/L (ref 3.8–5.2)
RBC # BLD AUTO: 4.39 10E12/L (ref 3.8–5.2)
RBC # BLD AUTO: 4.54 10E12/L (ref 3.8–5.2)
RBC # BLD AUTO: 5.18 10E12/L (ref 3.8–5.2)
RBC #/AREA URNS AUTO: 0 /HPF (ref 0–2)
RBC #/AREA URNS AUTO: 1 /HPF (ref 0–2)
RHINOVIRUS RNA SPEC QL NAA+PROBE: NEGATIVE
RSV RNA SPEC QL NAA+PROBE: NEGATIVE
RSV RNA SPEC QL NAA+PROBE: NEGATIVE
RVPLETH-%PRED-PRE: 64 %
RVPLETH-PRE: 1.44 L
RVPLETH-PRED: 2.24 L
S PNEUM AG SPEC QL: NORMAL
SA1 CELL: NORMAL
SA1 CELL: NORMAL
SA1 COMMENTS: NORMAL
SA1 COMMENTS: NORMAL
SA1 HI RISK ABY: NORMAL
SA1 HI RISK ABY: NORMAL
SA1 MOD RISK ABY: NORMAL
SA1 MOD RISK ABY: NORMAL
SA1 TEST METHOD: NORMAL
SA1 TEST METHOD: NORMAL
SA2 CELL: NORMAL
SA2 CELL: NORMAL
SA2 COMMENTS: NORMAL
SA2 COMMENTS: NORMAL
SA2 HI RISK ABY UA: NORMAL
SA2 HI RISK ABY UA: NORMAL
SA2 MOD RISK ABY: NORMAL
SA2 MOD RISK ABY: NORMAL
SA2 TEST METHOD: NORMAL
SA2 TEST METHOD: NORMAL
SAO2 % BLDV FROM PO2: 33 %
SODIUM SERPL-SCNC: 136 MMOL/L (ref 133–144)
SODIUM SERPL-SCNC: 138 MMOL/L (ref 133–144)
SODIUM SERPL-SCNC: 139 MMOL/L (ref 133–144)
SODIUM SERPL-SCNC: 140 MMOL/L (ref 133–144)
SODIUM SERPL-SCNC: 141 MMOL/L (ref 133–144)
SODIUM SERPL-SCNC: 142 MMOL/L (ref 133–144)
SODIUM SERPL-SCNC: 144 MMOL/L (ref 133–144)
SODIUM SERPL-SCNC: 145 MMOL/L (ref 133–144)
SODIUM SERPL-SCNC: 147 MMOL/L (ref 133–144)
SP GR UR STRIP: 1.02 (ref 1–1.03)
SP GR UR STRIP: 1.03 (ref 1–1.03)
SPECIMEN EXP DATE BLD: NORMAL
SPECIMEN SOURCE: NORMAL
SQUAMOUS #/AREA URNS AUTO: 1 /HPF (ref 0–1)
SQUAMOUS #/AREA URNS AUTO: 1 /HPF (ref 0–1)
TLCPLETH-%PRED-PRE: 67 %
TLCPLETH-PRE: 3.82 L
TLCPLETH-PRED: 5.65 L
TRIGL SERPL-MCNC: 68 MG/DL
TROPONIN I BLD-MCNC: 0 UG/L (ref 0–0.1)
TROPONIN I SERPL-MCNC: 0.12 UG/L (ref 0–0.04)
TROPONIN I SERPL-MCNC: 0.12 UG/L (ref 0–0.04)
TROPONIN I SERPL-MCNC: 0.27 UG/L (ref 0–0.04)
TROPONIN I SERPL-MCNC: 0.3 UG/L (ref 0–0.04)
URN SPEC COLLECT METH UR: ABNORMAL
URN SPEC COLLECT METH UR: ABNORMAL
UROBILINOGEN UR STRIP-MCNC: NORMAL MG/DL (ref 0–2)
UROBILINOGEN UR STRIP-MCNC: NORMAL MG/DL (ref 0–2)
VA-%PRED-PRE: 52 %
VA-PRE: 3.04 L
VC-%PRED-PRE: 65 %
VC-PRE: 2.37 L
VC-PRED: 3.62 L
WBC # BLD AUTO: 10.6 10E9/L (ref 4–11)
WBC # BLD AUTO: 11.6 10E9/L (ref 4–11)
WBC # BLD AUTO: 12.2 10E9/L (ref 4–11)
WBC # BLD AUTO: 12.6 10E9/L (ref 4–11)
WBC # BLD AUTO: 13.8 10E9/L (ref 4–11)
WBC # BLD AUTO: 14 10E9/L (ref 4–11)
WBC # BLD AUTO: 14.8 10E9/L (ref 4–11)
WBC # BLD AUTO: 6.7 10E9/L (ref 4–11)
WBC # BLD AUTO: 7 10E9/L (ref 4–11)
WBC # BLD AUTO: 7.1 10E9/L (ref 4–11)
WBC # BLD AUTO: 8.1 10E9/L (ref 4–11)
WBC # BLD AUTO: 8.6 10E9/L (ref 4–11)
WBC # BLD AUTO: 9.2 10E9/L (ref 4–11)
WBC # BLD AUTO: 9.6 10E9/L (ref 4–11)
WBC #/AREA URNS AUTO: 4 /HPF (ref 0–2)
WBC #/AREA URNS AUTO: <1 /HPF (ref 0–2)

## 2017-01-01 PROCEDURE — 36415 COLL VENOUS BLD VENIPUNCTURE: CPT | Performed by: INTERNAL MEDICINE

## 2017-01-01 PROCEDURE — 80048 BASIC METABOLIC PNL TOTAL CA: CPT | Performed by: INTERNAL MEDICINE

## 2017-01-01 PROCEDURE — 87086 URINE CULTURE/COLONY COUNT: CPT | Performed by: INTERNAL MEDICINE

## 2017-01-01 PROCEDURE — 99223 1ST HOSP IP/OBS HIGH 75: CPT | Performed by: NURSE PRACTITIONER

## 2017-01-01 PROCEDURE — 37000008 ZZH ANESTHESIA TECHNICAL FEE, 1ST 30 MIN

## 2017-01-01 PROCEDURE — 80048 BASIC METABOLIC PNL TOTAL CA: CPT | Performed by: PHYSICIAN ASSISTANT

## 2017-01-01 PROCEDURE — 85610 PROTHROMBIN TIME: CPT | Mod: QW

## 2017-01-01 PROCEDURE — 25000128 H RX IP 250 OP 636: Performed by: INTERNAL MEDICINE

## 2017-01-01 PROCEDURE — 85610 PROTHROMBIN TIME: CPT | Performed by: INTERNAL MEDICINE

## 2017-01-01 PROCEDURE — A9270 NON-COVERED ITEM OR SERVICE: HCPCS | Mod: GY | Performed by: STUDENT IN AN ORGANIZED HEALTH CARE EDUCATION/TRAINING PROGRAM

## 2017-01-01 PROCEDURE — 83735 ASSAY OF MAGNESIUM: CPT | Performed by: INTERNAL MEDICINE

## 2017-01-01 PROCEDURE — 27210995 ZZH RX 272: Performed by: INTERNAL MEDICINE

## 2017-01-01 PROCEDURE — 85610 PROTHROMBIN TIME: CPT | Performed by: EMERGENCY MEDICINE

## 2017-01-01 PROCEDURE — 40000014 ZZH STATISTIC ARTERIAL MONITORING DAILY

## 2017-01-01 PROCEDURE — 25000125 ZZHC RX 250: Performed by: INTERNAL MEDICINE

## 2017-01-01 PROCEDURE — 80053 COMPREHEN METABOLIC PANEL: CPT | Performed by: INTERNAL MEDICINE

## 2017-01-01 PROCEDURE — 20000004 ZZH R&B ICU UMMC

## 2017-01-01 PROCEDURE — 94003 VENT MGMT INPAT SUBQ DAY: CPT

## 2017-01-01 PROCEDURE — 36416 COLLJ CAPILLARY BLOOD SPEC: CPT | Performed by: INTERNAL MEDICINE

## 2017-01-01 PROCEDURE — 12000006 ZZH R&B IMCU INTERMEDIATE UMMC

## 2017-01-01 PROCEDURE — 25000128 H RX IP 250 OP 636: Performed by: STUDENT IN AN ORGANIZED HEALTH CARE EDUCATION/TRAINING PROGRAM

## 2017-01-01 PROCEDURE — 99207 ZZC CDG-CORRECTLY CODED, REVIEWED AND AGREE: CPT | Performed by: NURSE PRACTITIONER

## 2017-01-01 PROCEDURE — 94640 AIRWAY INHALATION TREATMENT: CPT | Mod: 76

## 2017-01-01 PROCEDURE — 27210982 ZZH KIT RT HC TOTES DISP CR7

## 2017-01-01 PROCEDURE — 25000132 ZZH RX MED GY IP 250 OP 250 PS 637: Mod: GY | Performed by: PHYSICIAN ASSISTANT

## 2017-01-01 PROCEDURE — 71250 CT THORAX DX C-: CPT

## 2017-01-01 PROCEDURE — 93010 ELECTROCARDIOGRAM REPORT: CPT | Mod: Z6 | Performed by: FAMILY MEDICINE

## 2017-01-01 PROCEDURE — 84145 PROCALCITONIN (PCT): CPT | Performed by: EMERGENCY MEDICINE

## 2017-01-01 PROCEDURE — 40000275 ZZH STATISTIC RCP TIME EA 10 MIN

## 2017-01-01 PROCEDURE — 99232 SBSQ HOSP IP/OBS MODERATE 35: CPT | Performed by: NURSE PRACTITIONER

## 2017-01-01 PROCEDURE — 99223 1ST HOSP IP/OBS HIGH 75: CPT | Mod: GC | Performed by: INTERNAL MEDICINE

## 2017-01-01 PROCEDURE — 27210429 ZZH NUTRITION PRODUCT INTERMEDIATE LITER

## 2017-01-01 PROCEDURE — 40000166 ZZH STATISTIC PP CARE STAGE 1

## 2017-01-01 PROCEDURE — 82803 BLOOD GASES ANY COMBINATION: CPT | Performed by: EMERGENCY MEDICINE

## 2017-01-01 PROCEDURE — 25000132 ZZH RX MED GY IP 250 OP 250 PS 637: Mod: GY

## 2017-01-01 PROCEDURE — A9270 NON-COVERED ITEM OR SERVICE: HCPCS | Mod: GY | Performed by: INTERNAL MEDICINE

## 2017-01-01 PROCEDURE — 80053 COMPREHEN METABOLIC PANEL: CPT | Performed by: EMERGENCY MEDICINE

## 2017-01-01 PROCEDURE — 84145 PROCALCITONIN (PCT): CPT | Performed by: INTERNAL MEDICINE

## 2017-01-01 PROCEDURE — 71010 XR CHEST PORT 1 VW: CPT

## 2017-01-01 PROCEDURE — 99233 SBSQ HOSP IP/OBS HIGH 50: CPT | Mod: GC | Performed by: INTERNAL MEDICINE

## 2017-01-01 PROCEDURE — 87804 INFLUENZA ASSAY W/OPTIC: CPT | Performed by: EMERGENCY MEDICINE

## 2017-01-01 PROCEDURE — 25000132 ZZH RX MED GY IP 250 OP 250 PS 637: Mod: GY | Performed by: STUDENT IN AN ORGANIZED HEALTH CARE EDUCATION/TRAINING PROGRAM

## 2017-01-01 PROCEDURE — 82803 BLOOD GASES ANY COMBINATION: CPT | Performed by: INTERNAL MEDICINE

## 2017-01-01 PROCEDURE — 93880 EXTRACRANIAL BILAT STUDY: CPT

## 2017-01-01 PROCEDURE — 96366 THER/PROPH/DIAG IV INF ADDON: CPT | Performed by: EMERGENCY MEDICINE

## 2017-01-01 PROCEDURE — 94645 CONT INHLJ TX EACH ADDL HOUR: CPT

## 2017-01-01 PROCEDURE — 94660 CPAP INITIATION&MGMT: CPT

## 2017-01-01 PROCEDURE — 40000141 ZZH STATISTIC PERIPHERAL IV START W/O US GUIDANCE

## 2017-01-01 PROCEDURE — 84484 ASSAY OF TROPONIN QUANT: CPT | Performed by: INTERNAL MEDICINE

## 2017-01-01 PROCEDURE — 40000671 ZZH STATISTIC ANESTHESIA CASE

## 2017-01-01 PROCEDURE — 25000132 ZZH RX MED GY IP 250 OP 250 PS 637: Mod: GY | Performed by: INTERNAL MEDICINE

## 2017-01-01 PROCEDURE — 36600 WITHDRAWAL OF ARTERIAL BLOOD: CPT

## 2017-01-01 PROCEDURE — 25000125 ZZHC RX 250: Performed by: PHYSICIAN ASSISTANT

## 2017-01-01 PROCEDURE — 96367 TX/PROPH/DG ADDL SEQ IV INF: CPT | Performed by: EMERGENCY MEDICINE

## 2017-01-01 PROCEDURE — 85027 COMPLETE CBC AUTOMATED: CPT | Performed by: INTERNAL MEDICINE

## 2017-01-01 PROCEDURE — 25000132 ZZH RX MED GY IP 250 OP 250 PS 637: Mod: GY | Performed by: HOSPITALIST

## 2017-01-01 PROCEDURE — 25000125 ZZHC RX 250: Performed by: STUDENT IN AN ORGANIZED HEALTH CARE EDUCATION/TRAINING PROGRAM

## 2017-01-01 PROCEDURE — 92960 CARDIOVERSION ELECTRIC EXT: CPT

## 2017-01-01 PROCEDURE — 93308 TTE F-UP OR LMTD: CPT

## 2017-01-01 PROCEDURE — 83735 ASSAY OF MAGNESIUM: CPT | Performed by: PHYSICIAN ASSISTANT

## 2017-01-01 PROCEDURE — 87899 AGENT NOS ASSAY W/OPTIC: CPT | Performed by: PHYSICIAN ASSISTANT

## 2017-01-01 PROCEDURE — 84484 ASSAY OF TROPONIN QUANT: CPT | Performed by: EMERGENCY MEDICINE

## 2017-01-01 PROCEDURE — 36415 COLL VENOUS BLD VENIPUNCTURE: CPT | Performed by: PHYSICIAN ASSISTANT

## 2017-01-01 PROCEDURE — 86833 HLA CLASS II HIGH DEFIN QUAL: CPT | Performed by: INTERNAL MEDICINE

## 2017-01-01 PROCEDURE — A9270 NON-COVERED ITEM OR SERVICE: HCPCS | Mod: GY

## 2017-01-01 PROCEDURE — 4A023N6 MEASUREMENT OF CARDIAC SAMPLING AND PRESSURE, RIGHT HEART, PERCUTANEOUS APPROACH: ICD-10-PCS | Performed by: INTERNAL MEDICINE

## 2017-01-01 PROCEDURE — 25000125 ZZHC RX 250: Performed by: NURSE ANESTHETIST, CERTIFIED REGISTERED

## 2017-01-01 PROCEDURE — 40000115 ZZH STATISTIC NURSE TLC VISIT: Performed by: NURSE PRACTITIONER

## 2017-01-01 PROCEDURE — 99212 OFFICE O/P EST SF 10 MIN: CPT

## 2017-01-01 PROCEDURE — 36415 COLL VENOUS BLD VENIPUNCTURE: CPT | Performed by: EMERGENCY MEDICINE

## 2017-01-01 PROCEDURE — 00000146 ZZHCL STATISTIC GLUCOSE BY METER IP

## 2017-01-01 PROCEDURE — A9270 NON-COVERED ITEM OR SERVICE: HCPCS | Mod: GY | Performed by: PHYSICIAN ASSISTANT

## 2017-01-01 PROCEDURE — 99233 SBSQ HOSP IP/OBS HIGH 50: CPT | Performed by: PHYSICIAN ASSISTANT

## 2017-01-01 PROCEDURE — 84132 ASSAY OF SERUM POTASSIUM: CPT | Performed by: INTERNAL MEDICINE

## 2017-01-01 PROCEDURE — 40000556 ZZH STATISTIC PERIPHERAL IV START W US GUIDANCE

## 2017-01-01 PROCEDURE — 85730 THROMBOPLASTIN TIME PARTIAL: CPT | Performed by: FAMILY MEDICINE

## 2017-01-01 PROCEDURE — 92960 CARDIOVERSION ELECTRIC EXT: CPT | Performed by: INTERNAL MEDICINE

## 2017-01-01 PROCEDURE — 25000128 H RX IP 250 OP 636: Performed by: EMERGENCY MEDICINE

## 2017-01-01 PROCEDURE — 93005 ELECTROCARDIOGRAM TRACING: CPT | Performed by: OPTOMETRIST

## 2017-01-01 PROCEDURE — 99285 EMERGENCY DEPT VISIT HI MDM: CPT | Mod: 25 | Performed by: EMERGENCY MEDICINE

## 2017-01-01 PROCEDURE — 93005 ELECTROCARDIOGRAM TRACING: CPT | Performed by: EMERGENCY MEDICINE

## 2017-01-01 PROCEDURE — 93321 DOPPLER ECHO F-UP/LMTD STD: CPT | Mod: 26 | Performed by: INTERNAL MEDICINE

## 2017-01-01 PROCEDURE — 40000940 XR CHEST PORT 1 VW

## 2017-01-01 PROCEDURE — 83605 ASSAY OF LACTIC ACID: CPT | Performed by: INTERNAL MEDICINE

## 2017-01-01 PROCEDURE — 99207 ZZC APP CREDIT; MD BILLING SHARED VISIT: CPT | Mod: Z6 | Performed by: EMERGENCY MEDICINE

## 2017-01-01 PROCEDURE — 27210995 ZZH RX 272

## 2017-01-01 PROCEDURE — 99221 1ST HOSP IP/OBS SF/LOW 40: CPT | Mod: GC | Performed by: INTERNAL MEDICINE

## 2017-01-01 PROCEDURE — 37000009 ZZH ANESTHESIA TECHNICAL FEE, EACH ADDTL 15 MIN

## 2017-01-01 PROCEDURE — 84145 PROCALCITONIN (PCT): CPT | Performed by: FAMILY MEDICINE

## 2017-01-01 PROCEDURE — 96361 HYDRATE IV INFUSION ADD-ON: CPT | Mod: 59

## 2017-01-01 PROCEDURE — 82330 ASSAY OF CALCIUM: CPT | Performed by: INTERNAL MEDICINE

## 2017-01-01 PROCEDURE — 99207 ZZC NO CHARGE NURSE ONLY: CPT

## 2017-01-01 PROCEDURE — 36569 INSJ PICC 5 YR+ W/O IMAGING: CPT

## 2017-01-01 PROCEDURE — 85610 PROTHROMBIN TIME: CPT | Performed by: PHYSICIAN ASSISTANT

## 2017-01-01 PROCEDURE — 81001 URINALYSIS AUTO W/SCOPE: CPT | Performed by: INTERNAL MEDICINE

## 2017-01-01 PROCEDURE — 44500 INTRO GASTROINTESTINAL TUBE: CPT

## 2017-01-01 PROCEDURE — 40000940 XR CHEST 1 VW

## 2017-01-01 PROCEDURE — 82805 BLOOD GASES W/O2 SATURATION: CPT | Performed by: STUDENT IN AN ORGANIZED HEALTH CARE EDUCATION/TRAINING PROGRAM

## 2017-01-01 PROCEDURE — 99214 OFFICE O/P EST MOD 30 MIN: CPT | Mod: ZF

## 2017-01-01 PROCEDURE — 27210195 ZZH KIT POWER PICC DOUBLE LUMEN

## 2017-01-01 PROCEDURE — 36415 COLL VENOUS BLD VENIPUNCTURE: CPT | Performed by: NURSE PRACTITIONER

## 2017-01-01 PROCEDURE — 84100 ASSAY OF PHOSPHORUS: CPT | Performed by: STUDENT IN AN ORGANIZED HEALTH CARE EDUCATION/TRAINING PROGRAM

## 2017-01-01 PROCEDURE — 99291 CRITICAL CARE FIRST HOUR: CPT | Mod: GC | Performed by: INTERNAL MEDICINE

## 2017-01-01 PROCEDURE — 93451 RIGHT HEART CATH: CPT

## 2017-01-01 PROCEDURE — 85025 COMPLETE CBC W/AUTO DIFF WBC: CPT | Performed by: EMERGENCY MEDICINE

## 2017-01-01 PROCEDURE — 85610 PROTHROMBIN TIME: CPT | Performed by: FAMILY MEDICINE

## 2017-01-01 PROCEDURE — 5A1955Z RESPIRATORY VENTILATION, GREATER THAN 96 CONSECUTIVE HOURS: ICD-10-PCS | Performed by: INTERNAL MEDICINE

## 2017-01-01 PROCEDURE — 99233 SBSQ HOSP IP/OBS HIGH 50: CPT | Performed by: NURSE PRACTITIONER

## 2017-01-01 PROCEDURE — 93325 DOPPLER ECHO COLOR FLOW MAPG: CPT | Mod: 26 | Performed by: INTERNAL MEDICINE

## 2017-01-01 PROCEDURE — 99291 CRITICAL CARE FIRST HOUR: CPT | Mod: 25 | Performed by: FAMILY MEDICINE

## 2017-01-01 PROCEDURE — 99231 SBSQ HOSP IP/OBS SF/LOW 25: CPT | Performed by: NURSE PRACTITIONER

## 2017-01-01 PROCEDURE — 83880 ASSAY OF NATRIURETIC PEPTIDE: CPT | Performed by: INTERNAL MEDICINE

## 2017-01-01 PROCEDURE — 83605 ASSAY OF LACTIC ACID: CPT

## 2017-01-01 PROCEDURE — 99215 OFFICE O/P EST HI 40 MIN: CPT | Performed by: INTERNAL MEDICINE

## 2017-01-01 PROCEDURE — 82803 BLOOD GASES ANY COMBINATION: CPT

## 2017-01-01 PROCEDURE — 25000128 H RX IP 250 OP 636

## 2017-01-01 PROCEDURE — 84100 ASSAY OF PHOSPHORUS: CPT | Performed by: INTERNAL MEDICINE

## 2017-01-01 PROCEDURE — 99215 OFFICE O/P EST HI 40 MIN: CPT | Mod: ZP | Performed by: INTERNAL MEDICINE

## 2017-01-01 PROCEDURE — 93308 TTE F-UP OR LMTD: CPT | Mod: 26 | Performed by: INTERNAL MEDICINE

## 2017-01-01 PROCEDURE — 94644 CONT INHLJ TX 1ST HOUR: CPT

## 2017-01-01 PROCEDURE — 80048 BASIC METABOLIC PNL TOTAL CA: CPT | Performed by: STUDENT IN AN ORGANIZED HEALTH CARE EDUCATION/TRAINING PROGRAM

## 2017-01-01 PROCEDURE — 27211181 ZZH BALLOON TIP PRESSURE CR5

## 2017-01-01 PROCEDURE — A9270 NON-COVERED ITEM OR SERVICE: HCPCS | Mod: GY | Performed by: HOSPITALIST

## 2017-01-01 PROCEDURE — 25000125 ZZHC RX 250

## 2017-01-01 PROCEDURE — 93005 ELECTROCARDIOGRAM TRACING: CPT

## 2017-01-01 PROCEDURE — 84484 ASSAY OF TROPONIN QUANT: CPT | Performed by: FAMILY MEDICINE

## 2017-01-01 PROCEDURE — 80061 LIPID PANEL: CPT | Performed by: PHYSICIAN ASSISTANT

## 2017-01-01 PROCEDURE — 86832 HLA CLASS I HIGH DEFIN QUAL: CPT | Performed by: INTERNAL MEDICINE

## 2017-01-01 PROCEDURE — 86901 BLOOD TYPING SEROLOGIC RH(D): CPT | Performed by: FAMILY MEDICINE

## 2017-01-01 PROCEDURE — 83605 ASSAY OF LACTIC ACID: CPT | Performed by: EMERGENCY MEDICINE

## 2017-01-01 PROCEDURE — 84484 ASSAY OF TROPONIN QUANT: CPT | Performed by: PHYSICIAN ASSISTANT

## 2017-01-01 PROCEDURE — 83735 ASSAY OF MAGNESIUM: CPT | Performed by: STUDENT IN AN ORGANIZED HEALTH CARE EDUCATION/TRAINING PROGRAM

## 2017-01-01 PROCEDURE — 86900 BLOOD TYPING SEROLOGIC ABO: CPT | Performed by: FAMILY MEDICINE

## 2017-01-01 PROCEDURE — 40000275 ZZH STATISTIC RCP TIME EA 10 MIN: Performed by: OPTOMETRIST

## 2017-01-01 PROCEDURE — 94002 VENT MGMT INPAT INIT DAY: CPT

## 2017-01-01 PROCEDURE — 93970 EXTREMITY STUDY: CPT

## 2017-01-01 PROCEDURE — 80053 COMPREHEN METABOLIC PANEL: CPT | Performed by: FAMILY MEDICINE

## 2017-01-01 PROCEDURE — 99239 HOSP IP/OBS DSCHRG MGMT >30: CPT | Performed by: HOSPITALIST

## 2017-01-01 PROCEDURE — 25000128 H RX IP 250 OP 636: Performed by: FAMILY MEDICINE

## 2017-01-01 PROCEDURE — 96365 THER/PROPH/DIAG IV INF INIT: CPT | Performed by: EMERGENCY MEDICINE

## 2017-01-01 PROCEDURE — 25000128 H RX IP 250 OP 636: Performed by: PHYSICIAN ASSISTANT

## 2017-01-01 PROCEDURE — 99291 CRITICAL CARE FIRST HOUR: CPT | Mod: 25 | Performed by: INTERNAL MEDICINE

## 2017-01-01 PROCEDURE — 71020 XR CHEST 2 VW: CPT

## 2017-01-01 PROCEDURE — 93010 ELECTROCARDIOGRAM REPORT: CPT | Mod: Z6 | Performed by: EMERGENCY MEDICINE

## 2017-01-01 PROCEDURE — 84484 ASSAY OF TROPONIN QUANT: CPT

## 2017-01-01 PROCEDURE — 25000125 ZZHC RX 250: Performed by: RADIOLOGY

## 2017-01-01 PROCEDURE — 96376 TX/PRO/DX INJ SAME DRUG ADON: CPT

## 2017-01-01 PROCEDURE — 85027 COMPLETE CBC AUTOMATED: CPT | Performed by: PHYSICIAN ASSISTANT

## 2017-01-01 PROCEDURE — 27210788 ZZH MANIFOLD CR3

## 2017-01-01 PROCEDURE — 85610 PROTHROMBIN TIME: CPT | Performed by: NURSE PRACTITIONER

## 2017-01-01 PROCEDURE — 27210806 ZZH SHEATH CR5

## 2017-01-01 PROCEDURE — 93010 ELECTROCARDIOGRAM REPORT: CPT | Performed by: INTERNAL MEDICINE

## 2017-01-01 PROCEDURE — 87633 RESP VIRUS 12-25 TARGETS: CPT | Performed by: PHYSICIAN ASSISTANT

## 2017-01-01 PROCEDURE — 82803 BLOOD GASES ANY COMBINATION: CPT | Performed by: STUDENT IN AN ORGANIZED HEALTH CARE EDUCATION/TRAINING PROGRAM

## 2017-01-01 PROCEDURE — 99223 1ST HOSP IP/OBS HIGH 75: CPT | Mod: AI | Performed by: PHYSICIAN ASSISTANT

## 2017-01-01 PROCEDURE — 83880 ASSAY OF NATRIURETIC PEPTIDE: CPT | Performed by: EMERGENCY MEDICINE

## 2017-01-01 PROCEDURE — 93451 RIGHT HEART CATH: CPT | Mod: 26 | Performed by: INTERNAL MEDICINE

## 2017-01-01 PROCEDURE — 81001 URINALYSIS AUTO W/SCOPE: CPT | Performed by: EMERGENCY MEDICINE

## 2017-01-01 PROCEDURE — 25000128 H RX IP 250 OP 636: Performed by: NURSE ANESTHETIST, CERTIFIED REGISTERED

## 2017-01-01 PROCEDURE — 86850 RBC ANTIBODY SCREEN: CPT | Performed by: FAMILY MEDICINE

## 2017-01-01 PROCEDURE — 85025 COMPLETE CBC W/AUTO DIFF WBC: CPT | Performed by: FAMILY MEDICINE

## 2017-01-01 PROCEDURE — 99212 OFFICE O/P EST SF 10 MIN: CPT | Mod: ZF

## 2017-01-01 PROCEDURE — 83880 ASSAY OF NATRIURETIC PEPTIDE: CPT | Performed by: FAMILY MEDICINE

## 2017-01-01 PROCEDURE — 99232 SBSQ HOSP IP/OBS MODERATE 35: CPT | Mod: GC | Performed by: INTERNAL MEDICINE

## 2017-01-01 PROCEDURE — 25000125 ZZHC RX 250: Performed by: EMERGENCY MEDICINE

## 2017-01-01 PROCEDURE — 87641 MR-STAPH DNA AMP PROBE: CPT | Performed by: INTERNAL MEDICINE

## 2017-01-01 PROCEDURE — 99285 EMERGENCY DEPT VISIT HI MDM: CPT | Mod: 25

## 2017-01-01 PROCEDURE — 36620 INSERTION CATHETER ARTERY: CPT | Mod: GC | Performed by: INTERNAL MEDICINE

## 2017-01-01 PROCEDURE — 87040 BLOOD CULTURE FOR BACTERIA: CPT | Performed by: EMERGENCY MEDICINE

## 2017-01-01 PROCEDURE — 40000281 ZZH STATISTIC TRANSPORT TIME EA 15 MIN

## 2017-01-01 PROCEDURE — 99207 ZZC CDG-MDM COMPONENT: MEETS HIGH - UP CODED: CPT | Performed by: PHYSICIAN ASSISTANT

## 2017-01-01 PROCEDURE — 87040 BLOOD CULTURE FOR BACTERIA: CPT | Performed by: INTERNAL MEDICINE

## 2017-01-01 PROCEDURE — 96374 THER/PROPH/DIAG INJ IV PUSH: CPT

## 2017-01-01 PROCEDURE — 87640 STAPH A DNA AMP PROBE: CPT | Performed by: INTERNAL MEDICINE

## 2017-01-01 RX ORDER — NALOXONE HYDROCHLORIDE 0.4 MG/ML
.1-.4 INJECTION, SOLUTION INTRAMUSCULAR; INTRAVENOUS; SUBCUTANEOUS
Status: DISCONTINUED | OUTPATIENT
Start: 2017-01-01 | End: 2017-01-01 | Stop reason: HOSPADM

## 2017-01-01 RX ORDER — ONDANSETRON 2 MG/ML
4 INJECTION INTRAMUSCULAR; INTRAVENOUS EVERY 6 HOURS PRN
Status: DISCONTINUED | OUTPATIENT
Start: 2017-01-01 | End: 2017-04-29 | Stop reason: HOSPADM

## 2017-01-01 RX ORDER — WARFARIN SODIUM 5 MG/1
5 TABLET ORAL
Status: COMPLETED | OUTPATIENT
Start: 2017-01-01 | End: 2017-01-01

## 2017-01-01 RX ORDER — LIDOCAINE 40 MG/G
CREAM TOPICAL
Status: DISCONTINUED | OUTPATIENT
Start: 2017-01-01 | End: 2017-01-01 | Stop reason: CLARIF

## 2017-01-01 RX ORDER — POTASSIUM CHLORIDE 7.45 MG/ML
10 INJECTION INTRAVENOUS
Status: DISCONTINUED | OUTPATIENT
Start: 2017-01-01 | End: 2017-01-01 | Stop reason: HOSPADM

## 2017-01-01 RX ORDER — FENTANYL CITRATE 50 UG/ML
INJECTION, SOLUTION INTRAMUSCULAR; INTRAVENOUS
Status: COMPLETED
Start: 2017-01-01 | End: 2017-01-01

## 2017-01-01 RX ORDER — ONDANSETRON 2 MG/ML
4 INJECTION INTRAMUSCULAR; INTRAVENOUS EVERY 30 MIN PRN
Status: DISCONTINUED | OUTPATIENT
Start: 2017-01-01 | End: 2017-01-01 | Stop reason: HOSPADM

## 2017-01-01 RX ORDER — POTASSIUM CHLORIDE 29.8 MG/ML
20 INJECTION INTRAVENOUS
Status: DISCONTINUED | OUTPATIENT
Start: 2017-01-01 | End: 2017-01-01 | Stop reason: HOSPADM

## 2017-01-01 RX ORDER — LORAZEPAM 2 MG/ML
1-5 INJECTION INTRAMUSCULAR EVERY 30 MIN PRN
Status: DISCONTINUED | OUTPATIENT
Start: 2017-01-01 | End: 2017-01-01

## 2017-01-01 RX ORDER — WARFARIN SODIUM 5 MG/1
TABLET ORAL
Qty: 90 TABLET | Refills: 3 | Status: SHIPPED | OUTPATIENT
Start: 2017-01-01 | End: 2017-01-01

## 2017-01-01 RX ORDER — BENZONATATE 100 MG/1
100 CAPSULE ORAL 3 TIMES DAILY PRN
Status: DISCONTINUED | OUTPATIENT
Start: 2017-01-01 | End: 2017-01-01 | Stop reason: HOSPADM

## 2017-01-01 RX ORDER — AMOXICILLIN 250 MG
1-2 CAPSULE ORAL 2 TIMES DAILY
Status: DISCONTINUED | OUTPATIENT
Start: 2017-01-01 | End: 2017-01-01 | Stop reason: HOSPADM

## 2017-01-01 RX ORDER — KETAMINE HYDROCHLORIDE 10 MG/ML
50 INJECTION, SOLUTION INTRAMUSCULAR; INTRAVENOUS ONCE
Status: COMPLETED | OUTPATIENT
Start: 2017-01-01 | End: 2017-01-01

## 2017-01-01 RX ORDER — ALBUTEROL SULFATE 0.83 MG/ML
2.5 SOLUTION RESPIRATORY (INHALATION) EVERY 4 HOURS
Status: DISCONTINUED | OUTPATIENT
Start: 2017-01-01 | End: 2017-01-01

## 2017-01-01 RX ORDER — LORAZEPAM 0.5 MG/1
.5-1 TABLET ORAL
Status: DISCONTINUED | OUTPATIENT
Start: 2017-01-01 | End: 2017-04-29 | Stop reason: HOSPADM

## 2017-01-01 RX ORDER — BISACODYL 5 MG
5-15 TABLET, DELAYED RELEASE (ENTERIC COATED) ORAL DAILY PRN
Status: DISCONTINUED | OUTPATIENT
Start: 2017-01-01 | End: 2017-01-01 | Stop reason: HOSPADM

## 2017-01-01 RX ORDER — ATROPINE SULFATE 10 MG/ML
1-2 SOLUTION/ DROPS OPHTHALMIC
Status: DISCONTINUED | OUTPATIENT
Start: 2017-01-01 | End: 2017-04-29 | Stop reason: HOSPADM

## 2017-01-01 RX ORDER — POLYETHYLENE GLYCOL 3350 17 G/17G
17 POWDER, FOR SOLUTION ORAL DAILY PRN
Status: DISCONTINUED | OUTPATIENT
Start: 2017-01-01 | End: 2017-04-29 | Stop reason: HOSPADM

## 2017-01-01 RX ORDER — METOPROLOL SUCCINATE 25 MG/1
25 TABLET, EXTENDED RELEASE ORAL DAILY
Status: DISCONTINUED | OUTPATIENT
Start: 2017-01-01 | End: 2017-01-01 | Stop reason: HOSPADM

## 2017-01-01 RX ORDER — ACETYLCYSTEINE 200 MG/ML
2 SOLUTION ORAL; RESPIRATORY (INHALATION) EVERY 4 HOURS
Status: DISCONTINUED | OUTPATIENT
Start: 2017-01-01 | End: 2017-01-01

## 2017-01-01 RX ORDER — LORAZEPAM 2 MG/ML
.5-1 INJECTION INTRAMUSCULAR
Status: COMPLETED | OUTPATIENT
Start: 2017-01-01 | End: 2017-01-01

## 2017-01-01 RX ORDER — HEPARIN SODIUM,PORCINE 10 UNIT/ML
5-10 VIAL (ML) INTRAVENOUS
Status: DISCONTINUED | OUTPATIENT
Start: 2017-01-01 | End: 2017-04-29 | Stop reason: HOSPADM

## 2017-01-01 RX ORDER — LORAZEPAM 2 MG/ML
1 INJECTION INTRAMUSCULAR ONCE
Status: COMPLETED | OUTPATIENT
Start: 2017-01-01 | End: 2017-01-01

## 2017-01-01 RX ORDER — MORPHINE SULFATE 20 MG/ML
5-10 SOLUTION ORAL
Status: DISCONTINUED | OUTPATIENT
Start: 2017-01-01 | End: 2017-04-29 | Stop reason: HOSPADM

## 2017-01-01 RX ORDER — FLUORIDE TOOTHPASTE
5 TOOTHPASTE DENTAL 4 TIMES DAILY
Status: DISCONTINUED | OUTPATIENT
Start: 2017-01-01 | End: 2017-01-01

## 2017-01-01 RX ORDER — PROCHLORPERAZINE MALEATE 5 MG
5 TABLET ORAL EVERY 6 HOURS PRN
Status: DISCONTINUED | OUTPATIENT
Start: 2017-01-01 | End: 2017-04-29 | Stop reason: HOSPADM

## 2017-01-01 RX ORDER — CODEINE PHOSPHATE AND GUAIFENESIN 10; 100 MG/5ML; MG/5ML
1-2 SOLUTION ORAL EVERY 4 HOURS PRN
Qty: 236 ML | Refills: 2 | Status: SHIPPED | OUTPATIENT
Start: 2017-01-01 | End: 2017-01-01

## 2017-01-01 RX ORDER — POTASSIUM CHLORIDE 1.5 G/1.58G
20-40 POWDER, FOR SOLUTION ORAL
Status: DISCONTINUED | OUTPATIENT
Start: 2017-01-01 | End: 2017-01-01

## 2017-01-01 RX ORDER — ONDANSETRON 2 MG/ML
4 INJECTION INTRAMUSCULAR; INTRAVENOUS EVERY 6 HOURS PRN
Status: DISCONTINUED | OUTPATIENT
Start: 2017-01-01 | End: 2017-01-01 | Stop reason: HOSPADM

## 2017-01-01 RX ORDER — HEPARIN SODIUM,PORCINE 10 UNIT/ML
5-10 VIAL (ML) INTRAVENOUS EVERY 24 HOURS
Status: DISCONTINUED | OUTPATIENT
Start: 2017-01-01 | End: 2017-01-01

## 2017-01-01 RX ORDER — SODIUM CHLORIDE, SODIUM LACTATE, POTASSIUM CHLORIDE, CALCIUM CHLORIDE 600; 310; 30; 20 MG/100ML; MG/100ML; MG/100ML; MG/100ML
INJECTION, SOLUTION INTRAVENOUS CONTINUOUS
Status: DISCONTINUED | OUTPATIENT
Start: 2017-01-01 | End: 2017-01-01 | Stop reason: HOSPADM

## 2017-01-01 RX ORDER — SODIUM CHLORIDE 9 MG/ML
INJECTION, SOLUTION INTRAVENOUS
Status: COMPLETED
Start: 2017-01-01 | End: 2017-01-01

## 2017-01-01 RX ORDER — HYDROMORPHONE HYDROCHLORIDE 1 MG/ML
INJECTION, SOLUTION INTRAMUSCULAR; INTRAVENOUS; SUBCUTANEOUS
Status: COMPLETED
Start: 2017-01-01 | End: 2017-01-01

## 2017-01-01 RX ORDER — METHYLPREDNISOLONE SODIUM SUCCINATE 125 MG/2ML
60 INJECTION, POWDER, LYOPHILIZED, FOR SOLUTION INTRAMUSCULAR; INTRAVENOUS DAILY
Status: DISCONTINUED | OUTPATIENT
Start: 2017-01-01 | End: 2017-01-01

## 2017-01-01 RX ORDER — DIPHENHYDRAMINE HYDROCHLORIDE, ZINC ACETATE 2; .1 G/100G; G/100G
CREAM TOPICAL 3 TIMES DAILY PRN
Status: DISCONTINUED | OUTPATIENT
Start: 2017-01-01 | End: 2017-01-01 | Stop reason: HOSPADM

## 2017-01-01 RX ORDER — POTASSIUM CHLORIDE 7.45 MG/ML
10 INJECTION INTRAVENOUS
Status: DISCONTINUED | OUTPATIENT
Start: 2017-01-01 | End: 2017-01-01

## 2017-01-01 RX ORDER — IPRATROPIUM BROMIDE AND ALBUTEROL SULFATE 2.5; .5 MG/3ML; MG/3ML
3 SOLUTION RESPIRATORY (INHALATION) ONCE
Status: DISCONTINUED | OUTPATIENT
Start: 2017-01-01 | End: 2017-01-01

## 2017-01-01 RX ORDER — SPIRONOLACTONE 25 MG/1
TABLET ORAL
Qty: 90 TABLET | Refills: 3 | Status: SHIPPED | OUTPATIENT
Start: 2017-01-01 | End: 2017-01-01

## 2017-01-01 RX ORDER — SPIRONOLACTONE 25 MG/1
25 TABLET ORAL DAILY
Status: DISCONTINUED | OUTPATIENT
Start: 2017-01-01 | End: 2017-01-01

## 2017-01-01 RX ORDER — MORPHINE SULFATE 10 MG/5ML
5-10 SOLUTION ORAL
Status: DISCONTINUED | OUTPATIENT
Start: 2017-01-01 | End: 2017-04-29 | Stop reason: HOSPADM

## 2017-01-01 RX ORDER — METHYLPREDNISOLONE SODIUM SUCCINATE 125 MG/2ML
60 INJECTION, POWDER, LYOPHILIZED, FOR SOLUTION INTRAMUSCULAR; INTRAVENOUS DAILY
Status: COMPLETED | OUTPATIENT
Start: 2017-01-01 | End: 2017-01-01

## 2017-01-01 RX ORDER — ONDANSETRON 4 MG/1
4 TABLET, ORALLY DISINTEGRATING ORAL EVERY 6 HOURS PRN
Status: DISCONTINUED | OUTPATIENT
Start: 2017-01-01 | End: 2017-01-01 | Stop reason: HOSPADM

## 2017-01-01 RX ORDER — POTASSIUM CHLORIDE 750 MG/1
20-40 TABLET, EXTENDED RELEASE ORAL
Status: DISCONTINUED | OUTPATIENT
Start: 2017-01-01 | End: 2017-01-01 | Stop reason: HOSPADM

## 2017-01-01 RX ORDER — LORAZEPAM 2 MG/ML
.5-1 INJECTION INTRAMUSCULAR
Status: DISCONTINUED | OUTPATIENT
Start: 2017-01-01 | End: 2017-04-29 | Stop reason: HOSPADM

## 2017-01-01 RX ORDER — DIPHENHYDRAMINE HYDROCHLORIDE 50 MG/ML
50 INJECTION INTRAMUSCULAR; INTRAVENOUS ONCE
Status: DISCONTINUED | OUTPATIENT
Start: 2017-01-01 | End: 2017-01-01 | Stop reason: HOSPADM

## 2017-01-01 RX ORDER — WARFARIN SODIUM 7.5 MG/1
7.5 TABLET ORAL DAILY
Qty: 30 TABLET | Refills: 11 | Status: SHIPPED | OUTPATIENT
Start: 2017-01-01 | End: 2017-01-01

## 2017-01-01 RX ORDER — PROPOFOL 10 MG/ML
INJECTION, EMULSION INTRAVENOUS PRN
Status: DISCONTINUED | OUTPATIENT
Start: 2017-01-01 | End: 2017-01-01

## 2017-01-01 RX ORDER — LEVOFLOXACIN 5 MG/ML
750 INJECTION, SOLUTION INTRAVENOUS ONCE
Status: DISCONTINUED | OUTPATIENT
Start: 2017-01-01 | End: 2017-01-01

## 2017-01-01 RX ORDER — NYSTATIN 100000/ML
500000 SUSPENSION, ORAL (FINAL DOSE FORM) ORAL 4 TIMES DAILY
Status: DISCONTINUED | OUTPATIENT
Start: 2017-01-01 | End: 2017-01-01

## 2017-01-01 RX ORDER — FUROSEMIDE 10 MG/ML
10 INJECTION INTRAMUSCULAR; INTRAVENOUS ONCE
Status: COMPLETED | OUTPATIENT
Start: 2017-01-01 | End: 2017-01-01

## 2017-01-01 RX ORDER — ONDANSETRON 4 MG/1
4 TABLET, ORALLY DISINTEGRATING ORAL EVERY 6 HOURS PRN
Status: DISCONTINUED | OUTPATIENT
Start: 2017-01-01 | End: 2017-04-29 | Stop reason: HOSPADM

## 2017-01-01 RX ORDER — VECURONIUM BROMIDE 1 MG/ML
10 INJECTION, POWDER, LYOPHILIZED, FOR SOLUTION INTRAVENOUS ONCE
Status: COMPLETED | OUTPATIENT
Start: 2017-01-01 | End: 2017-01-01

## 2017-01-01 RX ORDER — LORAZEPAM 2 MG/ML
.5-1 INJECTION INTRAMUSCULAR ONCE
Status: COMPLETED | OUTPATIENT
Start: 2017-01-01 | End: 2017-01-01

## 2017-01-01 RX ORDER — AZITHROMYCIN 250 MG/1
TABLET, FILM COATED ORAL
Qty: 6 TABLET | Refills: 0 | Status: SHIPPED | OUTPATIENT
Start: 2017-01-01 | End: 2017-01-01

## 2017-01-01 RX ORDER — LORAZEPAM 2 MG/ML
1 INJECTION INTRAMUSCULAR EVERY 4 HOURS PRN
Status: DISCONTINUED | OUTPATIENT
Start: 2017-01-01 | End: 2017-01-01

## 2017-01-01 RX ORDER — MAGNESIUM SULFATE HEPTAHYDRATE 40 MG/ML
4 INJECTION, SOLUTION INTRAVENOUS EVERY 4 HOURS PRN
Status: DISCONTINUED | OUTPATIENT
Start: 2017-01-01 | End: 2017-01-01 | Stop reason: HOSPADM

## 2017-01-01 RX ORDER — GLYCOPYRROLATE 1 MG/1
2-4 TABLET ORAL EVERY 4 HOURS PRN
Status: DISCONTINUED | OUTPATIENT
Start: 2017-01-01 | End: 2017-04-29 | Stop reason: HOSPADM

## 2017-01-01 RX ORDER — MORPHINE SULFATE 2 MG/ML
1-2 INJECTION, SOLUTION INTRAMUSCULAR; INTRAVENOUS
Status: DISCONTINUED | OUTPATIENT
Start: 2017-01-01 | End: 2017-04-29 | Stop reason: HOSPADM

## 2017-01-01 RX ORDER — KETAMINE HYDROCHLORIDE 10 MG/ML
25-50 INJECTION, SOLUTION INTRAMUSCULAR; INTRAVENOUS
Status: DISCONTINUED | OUTPATIENT
Start: 2017-01-01 | End: 2017-04-29 | Stop reason: HOSPADM

## 2017-01-01 RX ORDER — LISINOPRIL 2.5 MG/1
2.5 TABLET ORAL DAILY
Status: DISCONTINUED | OUTPATIENT
Start: 2017-01-01 | End: 2017-01-01 | Stop reason: HOSPADM

## 2017-01-01 RX ORDER — FENTANYL CITRATE 50 UG/ML
50-100 INJECTION, SOLUTION INTRAMUSCULAR; INTRAVENOUS
Status: DISCONTINUED | OUTPATIENT
Start: 2017-01-01 | End: 2017-01-01

## 2017-01-01 RX ORDER — LIDOCAINE 40 MG/G
CREAM TOPICAL
Status: DISCONTINUED | OUTPATIENT
Start: 2017-01-01 | End: 2017-04-29 | Stop reason: HOSPADM

## 2017-01-01 RX ORDER — PROCHLORPERAZINE 25 MG
12.5 SUPPOSITORY, RECTAL RECTAL EVERY 12 HOURS PRN
Status: DISCONTINUED | OUTPATIENT
Start: 2017-01-01 | End: 2017-04-29 | Stop reason: HOSPADM

## 2017-01-01 RX ORDER — LIDOCAINE 40 MG/G
CREAM TOPICAL
Status: COMPLETED | OUTPATIENT
Start: 2017-01-01 | End: 2017-01-01

## 2017-01-01 RX ORDER — CODEINE PHOSPHATE AND GUAIFENESIN 10; 100 MG/5ML; MG/5ML
5-10 SOLUTION ORAL EVERY 4 HOURS PRN
Status: DISCONTINUED | OUTPATIENT
Start: 2017-01-01 | End: 2017-01-01 | Stop reason: HOSPADM

## 2017-01-01 RX ORDER — POTASSIUM CHLORIDE 1.5 G/1.58G
20-40 POWDER, FOR SOLUTION ORAL
Status: DISCONTINUED | OUTPATIENT
Start: 2017-01-01 | End: 2017-01-01 | Stop reason: HOSPADM

## 2017-01-01 RX ORDER — MORPHINE SULFATE 2 MG/ML
1-10 INJECTION, SOLUTION INTRAMUSCULAR; INTRAVENOUS EVERY 30 MIN PRN
Status: DISCONTINUED | OUTPATIENT
Start: 2017-01-01 | End: 2017-01-01

## 2017-01-01 RX ORDER — FUROSEMIDE 10 MG/ML
20 INJECTION INTRAMUSCULAR; INTRAVENOUS ONCE
Status: DISCONTINUED | OUTPATIENT
Start: 2017-01-01 | End: 2017-01-01 | Stop reason: HOSPADM

## 2017-01-01 RX ORDER — MORPHINE SULFATE 10 MG/ML
10 INJECTION, SOLUTION INTRAMUSCULAR; INTRAVENOUS ONCE
Status: COMPLETED | OUTPATIENT
Start: 2017-01-01 | End: 2017-01-01

## 2017-01-01 RX ORDER — FENTANYL CITRATE 50 UG/ML
100-200 INJECTION, SOLUTION INTRAMUSCULAR; INTRAVENOUS
Status: DISCONTINUED | OUTPATIENT
Start: 2017-01-01 | End: 2017-04-29 | Stop reason: HOSPADM

## 2017-01-01 RX ORDER — MAGNESIUM SULFATE HEPTAHYDRATE 40 MG/ML
4 INJECTION, SOLUTION INTRAVENOUS EVERY 4 HOURS PRN
Status: DISCONTINUED | OUTPATIENT
Start: 2017-01-01 | End: 2017-04-29 | Stop reason: HOSPADM

## 2017-01-01 RX ORDER — CODEINE PHOSPHATE AND GUAIFENESIN 10; 100 MG/5ML; MG/5ML
1-2 SOLUTION ORAL EVERY 4 HOURS PRN
Qty: 236 ML | Refills: 0 | Status: SHIPPED | OUTPATIENT
Start: 2017-01-01 | End: 2017-01-01

## 2017-01-01 RX ORDER — FUROSEMIDE 10 MG/ML
INJECTION INTRAMUSCULAR; INTRAVENOUS
Status: COMPLETED
Start: 2017-01-01 | End: 2017-01-01

## 2017-01-01 RX ORDER — NALOXONE HYDROCHLORIDE 0.4 MG/ML
.1-.4 INJECTION, SOLUTION INTRAMUSCULAR; INTRAVENOUS; SUBCUTANEOUS
Status: DISCONTINUED | OUTPATIENT
Start: 2017-01-01 | End: 2017-04-29 | Stop reason: HOSPADM

## 2017-01-01 RX ORDER — WARFARIN SODIUM 7.5 MG/1
7.5 TABLET ORAL ONCE
Status: DISCONTINUED | OUTPATIENT
Start: 2017-01-01 | End: 2017-01-01

## 2017-01-01 RX ORDER — PIRFENIDONE 267 MG/1
801 CAPSULE ORAL
Status: DISCONTINUED | OUTPATIENT
Start: 2017-01-01 | End: 2017-01-01

## 2017-01-01 RX ORDER — ALBUTEROL SULFATE 0.83 MG/ML
SOLUTION RESPIRATORY (INHALATION)
Status: COMPLETED
Start: 2017-01-01 | End: 2017-01-01

## 2017-01-01 RX ORDER — LIDOCAINE 40 MG/G
CREAM TOPICAL
Status: CANCELLED | OUTPATIENT
Start: 2017-01-01

## 2017-01-01 RX ORDER — METHYLPREDNISOLONE SODIUM SUCCINATE 125 MG/2ML
125 INJECTION, POWDER, LYOPHILIZED, FOR SOLUTION INTRAMUSCULAR; INTRAVENOUS ONCE
Status: COMPLETED | OUTPATIENT
Start: 2017-01-01 | End: 2017-01-01

## 2017-01-01 RX ORDER — SENNOSIDES 8.6 MG
2 TABLET ORAL 2 TIMES DAILY
Status: DISCONTINUED | OUTPATIENT
Start: 2017-01-01 | End: 2017-01-01

## 2017-01-01 RX ORDER — PIPERACILLIN SODIUM, TAZOBACTAM SODIUM 3; .375 G/15ML; G/15ML
3.38 INJECTION, POWDER, LYOPHILIZED, FOR SOLUTION INTRAVENOUS EVERY 6 HOURS
Status: DISCONTINUED | OUTPATIENT
Start: 2017-01-01 | End: 2017-01-01

## 2017-01-01 RX ORDER — POTASSIUM CHLORIDE 750 MG/1
20-40 TABLET, EXTENDED RELEASE ORAL
Status: DISCONTINUED | OUTPATIENT
Start: 2017-01-01 | End: 2017-01-01

## 2017-01-01 RX ORDER — IOPAMIDOL 755 MG/ML
75 INJECTION, SOLUTION INTRAVASCULAR ONCE
Status: DISCONTINUED | OUTPATIENT
Start: 2017-01-01 | End: 2017-01-01 | Stop reason: HOSPADM

## 2017-01-01 RX ORDER — MINERAL OIL/HYDROPHIL PETROLAT
OINTMENT (GRAM) TOPICAL EVERY 8 HOURS PRN
Status: DISCONTINUED | OUTPATIENT
Start: 2017-01-01 | End: 2017-04-29 | Stop reason: HOSPADM

## 2017-01-01 RX ORDER — HEPARIN SODIUM,PORCINE 10 UNIT/ML
2-5 VIAL (ML) INTRAVENOUS
Status: DISCONTINUED | OUTPATIENT
Start: 2017-01-01 | End: 2017-04-29 | Stop reason: HOSPADM

## 2017-01-01 RX ORDER — SPIRONOLACTONE 25 MG/1
25 TABLET ORAL DAILY
Qty: 90 TABLET | Refills: 3 | Status: SHIPPED | OUTPATIENT
Start: 2017-01-01

## 2017-01-01 RX ORDER — MORPHINE SULFATE 2 MG/ML
INJECTION, SOLUTION INTRAMUSCULAR; INTRAVENOUS
Status: COMPLETED
Start: 2017-01-01 | End: 2017-01-01

## 2017-01-01 RX ORDER — MORPHINE SULFATE 2 MG/ML
1-2 INJECTION, SOLUTION INTRAMUSCULAR; INTRAVENOUS
Status: DISCONTINUED | OUTPATIENT
Start: 2017-01-01 | End: 2017-01-01

## 2017-01-01 RX ORDER — FUROSEMIDE 10 MG/ML
20 INJECTION INTRAMUSCULAR; INTRAVENOUS ONCE
Status: COMPLETED | OUTPATIENT
Start: 2017-01-01 | End: 2017-01-01

## 2017-01-01 RX ORDER — WARFARIN SODIUM 5 MG/1
5 TABLET ORAL ONCE
Status: COMPLETED | OUTPATIENT
Start: 2017-01-01 | End: 2017-01-01

## 2017-01-01 RX ORDER — POTASSIUM CHLORIDE 29.8 MG/ML
20 INJECTION INTRAVENOUS
Status: DISCONTINUED | OUTPATIENT
Start: 2017-01-01 | End: 2017-01-01

## 2017-01-01 RX ORDER — HYDROXYZINE HYDROCHLORIDE 25 MG/1
TABLET, FILM COATED ORAL
Qty: 20 TABLET | Refills: 1 | Status: SHIPPED | OUTPATIENT
Start: 2017-01-01 | End: 2017-01-01

## 2017-01-01 RX ORDER — CODEINE PHOSPHATE AND GUAIFENESIN 10; 100 MG/5ML; MG/5ML
1-2 SOLUTION ORAL EVERY 4 HOURS PRN
Qty: 236 ML | Refills: 0 | Status: SHIPPED | OUTPATIENT
Start: 2017-01-01

## 2017-01-01 RX ORDER — SODIUM CHLORIDE 9 MG/ML
1000 INJECTION, SOLUTION INTRAVENOUS CONTINUOUS
Status: DISCONTINUED | OUTPATIENT
Start: 2017-01-01 | End: 2017-01-01

## 2017-01-01 RX ORDER — WARFARIN SODIUM 4 MG/1
4 TABLET ORAL
Status: DISCONTINUED | OUTPATIENT
Start: 2017-01-01 | End: 2017-01-01 | Stop reason: CLARIF

## 2017-01-01 RX ORDER — ONDANSETRON 4 MG/1
4 TABLET, ORALLY DISINTEGRATING ORAL EVERY 30 MIN PRN
Status: DISCONTINUED | OUTPATIENT
Start: 2017-01-01 | End: 2017-01-01 | Stop reason: HOSPADM

## 2017-01-01 RX ORDER — HYDROMORPHONE HYDROCHLORIDE 1 MG/ML
0.2 INJECTION, SOLUTION INTRAMUSCULAR; INTRAVENOUS; SUBCUTANEOUS
Status: DISCONTINUED | OUTPATIENT
Start: 2017-01-01 | End: 2017-01-01

## 2017-01-01 RX ORDER — PIRFENIDONE 267 MG/1
801 CAPSULE ORAL
Status: DISCONTINUED | OUTPATIENT
Start: 2017-01-01 | End: 2017-01-01 | Stop reason: HOSPADM

## 2017-01-01 RX ORDER — GLYCOPYRROLATE 0.2 MG/ML
.2-.4 INJECTION, SOLUTION INTRAMUSCULAR; INTRAVENOUS EVERY 4 HOURS PRN
Status: DISCONTINUED | OUTPATIENT
Start: 2017-01-01 | End: 2017-04-29 | Stop reason: HOSPADM

## 2017-01-01 RX ORDER — SPIRONOLACTONE 25 MG/1
25 TABLET ORAL DAILY
Status: DISCONTINUED | OUTPATIENT
Start: 2017-01-01 | End: 2017-01-01 | Stop reason: HOSPADM

## 2017-01-01 RX ORDER — PIPERACILLIN SODIUM, TAZOBACTAM SODIUM 4; .5 G/20ML; G/20ML
4.5 INJECTION, POWDER, LYOPHILIZED, FOR SOLUTION INTRAVENOUS ONCE
Status: COMPLETED | OUTPATIENT
Start: 2017-01-01 | End: 2017-01-01

## 2017-01-01 RX ORDER — POTASSIUM CL/LIDO/0.9 % NACL 10MEQ/0.1L
10 INTRAVENOUS SOLUTION, PIGGYBACK (ML) INTRAVENOUS
Status: DISCONTINUED | OUTPATIENT
Start: 2017-01-01 | End: 2017-01-01

## 2017-01-01 RX ADMIN — GUAIFENESIN AND CODEINE PHOSPHATE 10 ML: 10; 100 LIQUID ORAL at 01:28

## 2017-01-01 RX ADMIN — DIPHENHYDRAMINE HYDROCHLORIDE, ZINC ACETATE: 2; .1 CREAM TOPICAL at 10:06

## 2017-01-01 RX ADMIN — ALBUTEROL SULFATE 2.5 MG: 2.5 SOLUTION RESPIRATORY (INHALATION) at 15:55

## 2017-01-01 RX ADMIN — DEXTROSE AND SODIUM CHLORIDE: 5; 900 INJECTION, SOLUTION INTRAVENOUS at 05:04

## 2017-01-01 RX ADMIN — MINERAL OIL AND WHITE PETROLATUM: 150; 830 OINTMENT OPHTHALMIC at 21:13

## 2017-01-01 RX ADMIN — VECURONIUM BROMIDE 1.2 MCG/KG/MIN: 1 INJECTION, POWDER, LYOPHILIZED, FOR SOLUTION INTRAVENOUS at 10:35

## 2017-01-01 RX ADMIN — Medication 5 ML: at 16:52

## 2017-01-01 RX ADMIN — VECURONIUM BROMIDE 1.2 MCG/KG/MIN: 1 INJECTION, POWDER, LYOPHILIZED, FOR SOLUTION INTRAVENOUS at 20:33

## 2017-01-01 RX ADMIN — PIRFENIDONE 801 MG: 267 CAPSULE ORAL at 17:35

## 2017-01-01 RX ADMIN — OMEPRAZOLE 20 MG: 20 CAPSULE, DELAYED RELEASE ORAL at 09:05

## 2017-01-01 RX ADMIN — ALBUTEROL SULFATE 2.5 MG: 2.5 SOLUTION RESPIRATORY (INHALATION) at 08:50

## 2017-01-01 RX ADMIN — EPOPROSTENOL 20 NG/KG/MIN: 1.5 INJECTION, POWDER, LYOPHILIZED, FOR SOLUTION INTRAVENOUS at 14:24

## 2017-01-01 RX ADMIN — ACETYLCYSTEINE 2 ML: 200 SOLUTION ORAL; RESPIRATORY (INHALATION) at 12:31

## 2017-01-01 RX ADMIN — EPOPROSTENOL 20 NG/KG/MIN: 1.5 INJECTION, POWDER, LYOPHILIZED, FOR SOLUTION INTRAVENOUS at 03:41

## 2017-01-01 RX ADMIN — KETAMINE HCL-NACL SOLN PREF SY 50 MG/5ML-0.9% (10MG/ML) 25 MG: 10 SOLUTION PREFILLED SYRINGE at 16:40

## 2017-01-01 RX ADMIN — METOPROLOL SUCCINATE 25 MG: 25 TABLET, FILM COATED, EXTENDED RELEASE ORAL at 09:05

## 2017-01-01 RX ADMIN — PIRFENIDONE 801 MG: 267 CAPSULE ORAL at 08:42

## 2017-01-01 RX ADMIN — SENNOSIDES A AND B 10 ML: 415.36 LIQUID ORAL at 20:10

## 2017-01-01 RX ADMIN — NYSTATIN 500000 UNITS: 500000 SUSPENSION ORAL at 20:10

## 2017-01-01 RX ADMIN — EPOPROSTENOL 20 NG/KG/MIN: 1.5 INJECTION, POWDER, LYOPHILIZED, FOR SOLUTION INTRAVENOUS at 14:14

## 2017-01-01 RX ADMIN — LORAZEPAM 1 MG: 2 INJECTION INTRAMUSCULAR; INTRAVENOUS at 11:46

## 2017-01-01 RX ADMIN — ALBUTEROL SULFATE 2.5 MG: 2.5 SOLUTION RESPIRATORY (INHALATION) at 04:20

## 2017-01-01 RX ADMIN — FENTANYL CITRATE 200 MCG/HR: 50 INJECTION, SOLUTION INTRAMUSCULAR; INTRAVENOUS at 23:08

## 2017-01-01 RX ADMIN — LORAZEPAM 1 MG: 2 INJECTION INTRAMUSCULAR; INTRAVENOUS at 19:34

## 2017-01-01 RX ADMIN — ACETYLCYSTEINE 2 ML: 200 SOLUTION ORAL; RESPIRATORY (INHALATION) at 00:33

## 2017-01-01 RX ADMIN — MORPHINE SULFATE 5 MG: 2 INJECTION, SOLUTION INTRAMUSCULAR; INTRAVENOUS at 18:00

## 2017-01-01 RX ADMIN — MINERAL OIL AND WHITE PETROLATUM: 150; 830 OINTMENT OPHTHALMIC at 06:30

## 2017-01-01 RX ADMIN — SENNOSIDES A AND B 10 ML: 415.36 LIQUID ORAL at 19:42

## 2017-01-01 RX ADMIN — FENTANYL CITRATE 200 MCG/HR: 50 INJECTION, SOLUTION INTRAMUSCULAR; INTRAVENOUS at 03:00

## 2017-01-01 RX ADMIN — EPOPROSTENOL 20 NG/KG/MIN: 1.5 INJECTION, POWDER, LYOPHILIZED, FOR SOLUTION INTRAVENOUS at 08:44

## 2017-01-01 RX ADMIN — EPOPROSTENOL 20 NG/KG/MIN: 1.5 INJECTION, POWDER, LYOPHILIZED, FOR SOLUTION INTRAVENOUS at 16:45

## 2017-01-01 RX ADMIN — PANTOPRAZOLE SODIUM 40 MG: 40 INJECTION, POWDER, FOR SOLUTION INTRAVENOUS at 07:29

## 2017-01-01 RX ADMIN — MIDAZOLAM HYDROCHLORIDE 6 MG/HR: 5 INJECTION, SOLUTION INTRAMUSCULAR; INTRAVENOUS at 16:41

## 2017-01-01 RX ADMIN — PANTOPRAZOLE SODIUM 40 MG: 40 INJECTION, POWDER, FOR SOLUTION INTRAVENOUS at 08:50

## 2017-01-01 RX ADMIN — EPOPROSTENOL 20 NG/KG/MIN: 1.5 INJECTION, POWDER, LYOPHILIZED, FOR SOLUTION INTRAVENOUS at 16:42

## 2017-01-01 RX ADMIN — VANCOMYCIN HYDROCHLORIDE 2000 MG: 1 INJECTION, POWDER, LYOPHILIZED, FOR SOLUTION INTRAVENOUS at 21:01

## 2017-01-01 RX ADMIN — FENTANYL CITRATE 200 MCG/HR: 50 INJECTION, SOLUTION INTRAMUSCULAR; INTRAVENOUS at 06:45

## 2017-01-01 RX ADMIN — MULTIVITAMIN 15 ML: LIQUID ORAL at 09:26

## 2017-01-01 RX ADMIN — FENTANYL CITRATE 200 MCG: 50 INJECTION, SOLUTION INTRAMUSCULAR; INTRAVENOUS at 15:20

## 2017-01-01 RX ADMIN — SODIUM CHLORIDE 500 ML: 9 INJECTION, SOLUTION INTRAVENOUS at 00:23

## 2017-01-01 RX ADMIN — GUAIFENESIN AND CODEINE PHOSPHATE 10 ML: 10; 100 LIQUID ORAL at 05:31

## 2017-01-01 RX ADMIN — DIPHENHYDRAMINE HYDROCHLORIDE, ZINC ACETATE: 2; .1 CREAM TOPICAL at 15:40

## 2017-01-01 RX ADMIN — LORAZEPAM 0.5 MG: 2 INJECTION INTRAMUSCULAR; INTRAVENOUS at 07:17

## 2017-01-01 RX ADMIN — ACETYLCYSTEINE 2 ML: 200 SOLUTION ORAL; RESPIRATORY (INHALATION) at 12:02

## 2017-01-01 RX ADMIN — POLYETHYLENE GLYCOL 3350 17 G: 17 POWDER, FOR SOLUTION ORAL at 08:29

## 2017-01-01 RX ADMIN — VECURONIUM BROMIDE 1.2 MCG/KG/MIN: 1 INJECTION, POWDER, LYOPHILIZED, FOR SOLUTION INTRAVENOUS at 13:58

## 2017-01-01 RX ADMIN — MORPHINE SULFATE 2 MG: 2 INJECTION, SOLUTION INTRAMUSCULAR; INTRAVENOUS at 21:55

## 2017-01-01 RX ADMIN — PIRFENIDONE 801 MG: 267 CAPSULE ORAL at 11:45

## 2017-01-01 RX ADMIN — SENNOSIDES A AND B 10 ML: 415.36 LIQUID ORAL at 19:41

## 2017-01-01 RX ADMIN — MIDAZOLAM HYDROCHLORIDE 10 MG/HR: 5 INJECTION, SOLUTION INTRAMUSCULAR; INTRAVENOUS at 16:45

## 2017-01-01 RX ADMIN — Medication 50 MG: at 13:45

## 2017-01-01 RX ADMIN — HYDROMORPHONE HYDROCHLORIDE 0.2 MG: 10 INJECTION, SOLUTION INTRAMUSCULAR; INTRAVENOUS; SUBCUTANEOUS at 00:56

## 2017-01-01 RX ADMIN — ACETYLCYSTEINE 2 ML: 200 SOLUTION ORAL; RESPIRATORY (INHALATION) at 01:04

## 2017-01-01 RX ADMIN — LORAZEPAM 1 MG: 2 INJECTION INTRAMUSCULAR; INTRAVENOUS at 03:06

## 2017-01-01 RX ADMIN — FUROSEMIDE 20 MG: 10 INJECTION, SOLUTION INTRAVENOUS at 03:22

## 2017-01-01 RX ADMIN — ACETYLCYSTEINE 2 ML: 200 SOLUTION ORAL; RESPIRATORY (INHALATION) at 08:21

## 2017-01-01 RX ADMIN — MORPHINE SULFATE 2 MG: 2 INJECTION, SOLUTION INTRAMUSCULAR; INTRAVENOUS at 03:58

## 2017-01-01 RX ADMIN — ALBUTEROL SULFATE 2.5 MG: 2.5 SOLUTION RESPIRATORY (INHALATION) at 01:04

## 2017-01-01 RX ADMIN — ALBUTEROL SULFATE 2.5 MG: 2.5 SOLUTION RESPIRATORY (INHALATION) at 09:00

## 2017-01-01 RX ADMIN — Medication 2 G: at 11:44

## 2017-01-01 RX ADMIN — NYSTATIN 500000 UNITS: 500000 SUSPENSION ORAL at 12:00

## 2017-01-01 RX ADMIN — MINERAL OIL AND WHITE PETROLATUM: 150; 830 OINTMENT OPHTHALMIC at 21:53

## 2017-01-01 RX ADMIN — ACETYLCYSTEINE 2 ML: 200 SOLUTION ORAL; RESPIRATORY (INHALATION) at 08:50

## 2017-01-01 RX ADMIN — ACETYLCYSTEINE 2 ML: 200 SOLUTION ORAL; RESPIRATORY (INHALATION) at 12:51

## 2017-01-01 RX ADMIN — SENNOSIDES A AND B 10 ML: 415.36 LIQUID ORAL at 08:50

## 2017-01-01 RX ADMIN — MORPHINE SULFATE 2 MG: 2 INJECTION, SOLUTION INTRAMUSCULAR; INTRAVENOUS at 11:40

## 2017-01-01 RX ADMIN — FENTANYL CITRATE 100 MCG/HR: 50 INJECTION, SOLUTION INTRAMUSCULAR; INTRAVENOUS at 15:17

## 2017-01-01 RX ADMIN — PIRFENIDONE 801 MG: 267 CAPSULE ORAL at 09:20

## 2017-01-01 RX ADMIN — PANTOPRAZOLE SODIUM 40 MG: 40 INJECTION, POWDER, FOR SOLUTION INTRAVENOUS at 07:52

## 2017-01-01 RX ADMIN — SENNOSIDES A AND B 10 ML: 415.36 LIQUID ORAL at 08:29

## 2017-01-01 RX ADMIN — KETAMINE HYDROCHLORIDE 1 MG/KG/HR: 50 INJECTION INTRAMUSCULAR; INTRAVENOUS at 21:00

## 2017-01-01 RX ADMIN — SODIUM CHLORIDE 1000 ML: 9 INJECTION, SOLUTION INTRAVENOUS at 00:18

## 2017-01-01 RX ADMIN — EPOPROSTENOL 20 NG/KG/MIN: 1.5 INJECTION, POWDER, LYOPHILIZED, FOR SOLUTION INTRAVENOUS at 22:21

## 2017-01-01 RX ADMIN — ALBUTEROL SULFATE 2.5 MG: 2.5 SOLUTION RESPIRATORY (INHALATION) at 04:38

## 2017-01-01 RX ADMIN — GUAIFENESIN AND CODEINE PHOSPHATE 10 ML: 10; 100 LIQUID ORAL at 21:16

## 2017-01-01 RX ADMIN — LORAZEPAM 1 MG: 2 INJECTION INTRAMUSCULAR; INTRAVENOUS at 00:08

## 2017-01-01 RX ADMIN — MINERAL OIL AND WHITE PETROLATUM: 150; 830 OINTMENT OPHTHALMIC at 14:01

## 2017-01-01 RX ADMIN — EPOPROSTENOL 20 NG/KG/MIN: 1.5 INJECTION, POWDER, LYOPHILIZED, FOR SOLUTION INTRAVENOUS at 05:09

## 2017-01-01 RX ADMIN — Medication 2 G: at 00:21

## 2017-01-01 RX ADMIN — HYDROMORPHONE HYDROCHLORIDE 0.2 MG: 10 INJECTION, SOLUTION INTRAMUSCULAR; INTRAVENOUS; SUBCUTANEOUS at 21:36

## 2017-01-01 RX ADMIN — KETAMINE HYDROCHLORIDE 1 MG/KG/HR: 100 INJECTION, SOLUTION, CONCENTRATE INTRAMUSCULAR; INTRAVENOUS at 13:40

## 2017-01-01 RX ADMIN — ACETYLCYSTEINE 2 ML: 200 SOLUTION ORAL; RESPIRATORY (INHALATION) at 20:07

## 2017-01-01 RX ADMIN — METHYLPREDNISOLONE SODIUM SUCCINATE 62.5 MG: 125 INJECTION, POWDER, LYOPHILIZED, FOR SOLUTION INTRAMUSCULAR; INTRAVENOUS at 09:59

## 2017-01-01 RX ADMIN — FENTANYL CITRATE 100 MCG: 50 INJECTION, SOLUTION INTRAMUSCULAR; INTRAVENOUS at 13:46

## 2017-01-01 RX ADMIN — FENTANYL CITRATE 200 MCG/HR: 50 INJECTION, SOLUTION INTRAMUSCULAR; INTRAVENOUS at 06:20

## 2017-01-01 RX ADMIN — ACETYLCYSTEINE 2 ML: 200 SOLUTION ORAL; RESPIRATORY (INHALATION) at 16:44

## 2017-01-01 RX ADMIN — ROCURONIUM BROMIDE 50 MG: 10 INJECTION INTRAVENOUS at 15:34

## 2017-01-01 RX ADMIN — KETAMINE HYDROCHLORIDE 1 MG/KG/HR: 100 INJECTION, SOLUTION INTRAMUSCULAR; INTRAVENOUS at 04:36

## 2017-01-01 RX ADMIN — EPOPROSTENOL 20 NG/KG/MIN: 1.5 INJECTION, POWDER, LYOPHILIZED, FOR SOLUTION INTRAVENOUS at 09:21

## 2017-01-01 RX ADMIN — KETAMINE HYDROCHLORIDE 1 MG/KG/HR: 100 INJECTION, SOLUTION INTRAMUSCULAR; INTRAVENOUS at 18:20

## 2017-01-01 RX ADMIN — FENTANYL CITRATE 200 MCG/HR: 50 INJECTION, SOLUTION INTRAMUSCULAR; INTRAVENOUS at 13:34

## 2017-01-01 RX ADMIN — FENTANYL CITRATE 100 MCG/HR: 50 INJECTION, SOLUTION INTRAMUSCULAR; INTRAVENOUS at 01:32

## 2017-01-01 RX ADMIN — Medication 5 ML: at 15:32

## 2017-01-01 RX ADMIN — MORPHINE SULFATE 10 MG: 2 INJECTION, SOLUTION INTRAMUSCULAR; INTRAVENOUS at 19:17

## 2017-01-01 RX ADMIN — KETAMINE HYDROCHLORIDE 1 MG/KG/HR: 100 INJECTION, SOLUTION INTRAMUSCULAR; INTRAVENOUS at 15:30

## 2017-01-01 RX ADMIN — PANTOPRAZOLE SODIUM 40 MG: 40 INJECTION, POWDER, FOR SOLUTION INTRAVENOUS at 08:40

## 2017-01-01 RX ADMIN — ACETYLCYSTEINE 2 ML: 200 SOLUTION ORAL; RESPIRATORY (INHALATION) at 16:33

## 2017-01-01 RX ADMIN — LORAZEPAM 1 MG: 2 INJECTION INTRAMUSCULAR; INTRAVENOUS at 08:45

## 2017-01-01 RX ADMIN — MINERAL OIL AND WHITE PETROLATUM: 150; 830 OINTMENT OPHTHALMIC at 06:39

## 2017-01-01 RX ADMIN — LORAZEPAM 1 MG: 2 INJECTION INTRAMUSCULAR; INTRAVENOUS at 15:29

## 2017-01-01 RX ADMIN — VECURONIUM BROMIDE 0.8 MCG/KG/MIN: 1 INJECTION, POWDER, LYOPHILIZED, FOR SOLUTION INTRAVENOUS at 13:39

## 2017-01-01 RX ADMIN — METHYLPREDNISOLONE SODIUM SUCCINATE 62.5 MG: 125 INJECTION, POWDER, LYOPHILIZED, FOR SOLUTION INTRAMUSCULAR; INTRAVENOUS at 17:28

## 2017-01-01 RX ADMIN — LORAZEPAM 1 MG: 2 INJECTION INTRAMUSCULAR; INTRAVENOUS at 17:59

## 2017-01-01 RX ADMIN — FENTANYL CITRATE 100 MCG: 50 INJECTION, SOLUTION INTRAMUSCULAR; INTRAVENOUS at 11:39

## 2017-01-01 RX ADMIN — METHYLPREDNISOLONE SODIUM SUCCINATE 62.5 MG: 125 INJECTION, POWDER, LYOPHILIZED, FOR SOLUTION INTRAMUSCULAR; INTRAVENOUS at 07:52

## 2017-01-01 RX ADMIN — POLYETHYLENE GLYCOL 3350 17 G: 17 POWDER, FOR SOLUTION ORAL at 09:23

## 2017-01-01 RX ADMIN — LORAZEPAM 1 MG: 2 INJECTION INTRAMUSCULAR; INTRAVENOUS at 03:58

## 2017-01-01 RX ADMIN — LORAZEPAM 1 MG: 2 INJECTION INTRAMUSCULAR; INTRAVENOUS at 12:44

## 2017-01-01 RX ADMIN — METHYLPREDNISOLONE SODIUM SUCCINATE 125 MG: 125 INJECTION, POWDER, LYOPHILIZED, FOR SOLUTION INTRAMUSCULAR; INTRAVENOUS at 15:32

## 2017-01-01 RX ADMIN — GUAIFENESIN AND CODEINE PHOSPHATE 5 ML: 10; 100 LIQUID ORAL at 23:27

## 2017-01-01 RX ADMIN — SODIUM CHLORIDE 1000 ML: 9 INJECTION, SOLUTION INTRAVENOUS at 13:45

## 2017-01-01 RX ADMIN — ALBUTEROL SULFATE 2.5 MG: 2.5 SOLUTION RESPIRATORY (INHALATION) at 20:07

## 2017-01-01 RX ADMIN — MIDAZOLAM HYDROCHLORIDE 6 MG/HR: 5 INJECTION, SOLUTION INTRAMUSCULAR; INTRAVENOUS at 15:55

## 2017-01-01 RX ADMIN — PIRFENIDONE 801 MG: 267 CAPSULE ORAL at 11:38

## 2017-01-01 RX ADMIN — OMEPRAZOLE 20 MG: 20 CAPSULE, DELAYED RELEASE ORAL at 08:29

## 2017-01-01 RX ADMIN — EPOPROSTENOL 20 NG/KG/MIN: 1.5 INJECTION, POWDER, LYOPHILIZED, FOR SOLUTION INTRAVENOUS at 02:33

## 2017-01-01 RX ADMIN — FENTANYL CITRATE 100 MCG: 50 INJECTION, SOLUTION INTRAMUSCULAR; INTRAVENOUS at 11:24

## 2017-01-01 RX ADMIN — WARFARIN SODIUM 5 MG: 5 TABLET ORAL at 00:42

## 2017-01-01 RX ADMIN — FUROSEMIDE 10 MG: 10 INJECTION, SOLUTION INTRAVENOUS at 05:40

## 2017-01-01 RX ADMIN — MORPHINE SULFATE 2 MG: 2 INJECTION, SOLUTION INTRAMUSCULAR; INTRAVENOUS at 09:30

## 2017-01-01 RX ADMIN — EPOPROSTENOL 20 NG/KG/MIN: 1.5 INJECTION, POWDER, LYOPHILIZED, FOR SOLUTION INTRAVENOUS at 21:14

## 2017-01-01 RX ADMIN — Medication 5 ML: at 12:11

## 2017-01-01 RX ADMIN — ALBUTEROL SULFATE 2.5 MG: 2.5 SOLUTION RESPIRATORY (INHALATION) at 12:31

## 2017-01-01 RX ADMIN — Medication 5 ML: at 19:49

## 2017-01-01 RX ADMIN — PIRFENIDONE 801 MG: 267 CAPSULE ORAL at 15:28

## 2017-01-01 RX ADMIN — PIRFENIDONE 801 MG: 267 CAPSULE ORAL at 19:08

## 2017-01-01 RX ADMIN — METHYLPREDNISOLONE SODIUM SUCCINATE 62.5 MG: 125 INJECTION, POWDER, LYOPHILIZED, FOR SOLUTION INTRAMUSCULAR; INTRAVENOUS at 07:50

## 2017-01-01 RX ADMIN — LIDOCAINE HYDROCHLORIDE 15 ML: 20 SOLUTION ORAL; TOPICAL at 10:27

## 2017-01-01 RX ADMIN — Medication 5 ML: at 19:42

## 2017-01-01 RX ADMIN — FENTANYL CITRATE 200 MCG/HR: 50 INJECTION, SOLUTION INTRAMUSCULAR; INTRAVENOUS at 16:46

## 2017-01-01 RX ADMIN — KETAMINE HYDROCHLORIDE 1 MG/KG/HR: 100 INJECTION, SOLUTION INTRAMUSCULAR; INTRAVENOUS at 03:53

## 2017-01-01 RX ADMIN — PIRFENIDONE 801 MG: 267 CAPSULE ORAL at 19:11

## 2017-01-01 RX ADMIN — PANTOPRAZOLE SODIUM 40 MG: 40 INJECTION, POWDER, FOR SOLUTION INTRAVENOUS at 07:50

## 2017-01-01 RX ADMIN — MIDAZOLAM HYDROCHLORIDE 1 MG/HR: 5 INJECTION, SOLUTION INTRAMUSCULAR; INTRAVENOUS at 08:56

## 2017-01-01 RX ADMIN — MULTIVITAMIN 15 ML: LIQUID ORAL at 12:54

## 2017-01-01 RX ADMIN — PANTOPRAZOLE SODIUM 40 MG: 40 INJECTION, POWDER, FOR SOLUTION INTRAVENOUS at 10:10

## 2017-01-01 RX ADMIN — DEXTROSE AND SODIUM CHLORIDE: 5; 900 INJECTION, SOLUTION INTRAVENOUS at 11:24

## 2017-01-01 RX ADMIN — Medication 2 G: at 16:03

## 2017-01-01 RX ADMIN — NYSTATIN 500000 UNITS: 500000 SUSPENSION ORAL at 08:29

## 2017-01-01 RX ADMIN — PIPERACILLIN SODIUM,TAZOBACTAM SODIUM 4.5 G: 4; .5 INJECTION, POWDER, FOR SOLUTION INTRAVENOUS at 19:57

## 2017-01-01 RX ADMIN — LORAZEPAM 0.5 MG: 2 INJECTION INTRAMUSCULAR; INTRAVENOUS at 10:49

## 2017-01-01 RX ADMIN — FENTANYL CITRATE 150 MCG/HR: 50 INJECTION, SOLUTION INTRAMUSCULAR; INTRAVENOUS at 13:35

## 2017-01-01 RX ADMIN — KETAMINE HYDROCHLORIDE 1 MG/KG/HR: 100 INJECTION, SOLUTION INTRAMUSCULAR; INTRAVENOUS at 05:25

## 2017-01-01 RX ADMIN — EPOPROSTENOL 20 NG/KG/MIN: 1.5 INJECTION, POWDER, LYOPHILIZED, FOR SOLUTION INTRAVENOUS at 13:33

## 2017-01-01 RX ADMIN — METHYLPREDNISOLONE SODIUM SUCCINATE 62.5 MG: 125 INJECTION, POWDER, LYOPHILIZED, FOR SOLUTION INTRAMUSCULAR; INTRAVENOUS at 08:50

## 2017-01-01 RX ADMIN — BENZONATATE 100 MG: 100 CAPSULE, LIQUID FILLED ORAL at 15:34

## 2017-01-01 RX ADMIN — WARFARIN SODIUM 5 MG: 5 TABLET ORAL at 18:23

## 2017-01-01 RX ADMIN — WARFARIN SODIUM 5 MG: 5 TABLET ORAL at 18:05

## 2017-01-01 RX ADMIN — LORAZEPAM 1 MG: 2 INJECTION INTRAMUSCULAR; INTRAVENOUS at 21:44

## 2017-01-01 RX ADMIN — DEXTROSE AND SODIUM CHLORIDE: 5; 900 INJECTION, SOLUTION INTRAVENOUS at 19:13

## 2017-01-01 RX ADMIN — MIDAZOLAM HYDROCHLORIDE 9 MG/HR: 5 INJECTION, SOLUTION INTRAMUSCULAR; INTRAVENOUS at 02:33

## 2017-01-01 RX ADMIN — DEXTROSE AND SODIUM CHLORIDE: 5; 900 INJECTION, SOLUTION INTRAVENOUS at 20:02

## 2017-01-01 RX ADMIN — GUAIFENESIN AND CODEINE PHOSPHATE 10 ML: 10; 100 LIQUID ORAL at 17:32

## 2017-01-01 RX ADMIN — Medication 2 G: at 04:54

## 2017-01-01 RX ADMIN — LORAZEPAM 1 MG: 2 INJECTION INTRAMUSCULAR; INTRAVENOUS at 19:42

## 2017-01-01 RX ADMIN — MIDAZOLAM HYDROCHLORIDE 8 MG/HR: 5 INJECTION, SOLUTION INTRAMUSCULAR; INTRAVENOUS at 02:01

## 2017-01-01 RX ADMIN — METHYLPREDNISOLONE SODIUM SUCCINATE 62.5 MG: 125 INJECTION, POWDER, LYOPHILIZED, FOR SOLUTION INTRAMUSCULAR; INTRAVENOUS at 07:29

## 2017-01-01 RX ADMIN — MORPHINE SULFATE 2 MG: 2 INJECTION, SOLUTION INTRAMUSCULAR; INTRAVENOUS at 00:08

## 2017-01-01 RX ADMIN — HYDROMORPHONE HYDROCHLORIDE 0.2 MG: 10 INJECTION, SOLUTION INTRAMUSCULAR; INTRAVENOUS; SUBCUTANEOUS at 13:35

## 2017-01-01 RX ADMIN — FENTANYL CITRATE 200 MCG/HR: 50 INJECTION, SOLUTION INTRAMUSCULAR; INTRAVENOUS at 23:41

## 2017-01-01 RX ADMIN — FENTANYL CITRATE 100 MCG: 50 INJECTION, SOLUTION INTRAMUSCULAR; INTRAVENOUS at 08:11

## 2017-01-01 RX ADMIN — KETAMINE HCL-NACL SOLN PREF SY 50 MG/5ML-0.9% (10MG/ML) 25 MG: 10 SOLUTION PREFILLED SYRINGE at 18:12

## 2017-01-01 RX ADMIN — NYSTATIN 500000 UNITS: 500000 SUSPENSION ORAL at 19:41

## 2017-01-01 RX ADMIN — EPOPROSTENOL 20 NG/KG/MIN: 1.5 INJECTION, POWDER, LYOPHILIZED, FOR SOLUTION INTRAVENOUS at 10:06

## 2017-01-01 RX ADMIN — NYSTATIN 500000 UNITS: 500000 SUSPENSION ORAL at 16:03

## 2017-01-01 RX ADMIN — WARFARIN SODIUM 5 MG: 5 TABLET ORAL at 17:55

## 2017-01-01 RX ADMIN — SODIUM PHOSPHATE, MONOBASIC, MONOHYDRATE AND SODIUM PHOSPHATE, DIBASIC, ANHYDROUS 15 MMOL: 276; 142 INJECTION, SOLUTION INTRAVENOUS at 21:31

## 2017-01-01 RX ADMIN — BENZONATATE 100 MG: 100 CAPSULE, LIQUID FILLED ORAL at 22:34

## 2017-01-01 RX ADMIN — KETAMINE HYDROCHLORIDE 1 MG/KG/HR: 100 INJECTION, SOLUTION INTRAMUSCULAR; INTRAVENOUS at 07:27

## 2017-01-01 RX ADMIN — MIDAZOLAM HYDROCHLORIDE 4 MG/HR: 5 INJECTION, SOLUTION INTRAMUSCULAR; INTRAVENOUS at 16:18

## 2017-01-01 RX ADMIN — ALBUTEROL SULFATE 2.5 MG: 2.5 SOLUTION RESPIRATORY (INHALATION) at 00:28

## 2017-01-01 RX ADMIN — MIDAZOLAM HYDROCHLORIDE 8 MG/HR: 5 INJECTION, SOLUTION INTRAMUSCULAR; INTRAVENOUS at 14:55

## 2017-01-01 RX ADMIN — PIRFENIDONE 801 MG: 267 CAPSULE ORAL at 07:50

## 2017-01-01 RX ADMIN — METOPROLOL SUCCINATE 25 MG: 25 TABLET, FILM COATED, EXTENDED RELEASE ORAL at 08:28

## 2017-01-01 RX ADMIN — LIDOCAINE: 40 CREAM TOPICAL at 10:30

## 2017-01-01 RX ADMIN — FENTANYL CITRATE 200 MCG/HR: 50 INJECTION, SOLUTION INTRAMUSCULAR; INTRAVENOUS at 14:14

## 2017-01-01 RX ADMIN — FENTANYL CITRATE 200 MCG/HR: 50 INJECTION, SOLUTION INTRAMUSCULAR; INTRAVENOUS at 13:58

## 2017-01-01 RX ADMIN — GUAIFENESIN AND CODEINE PHOSPHATE 10 ML: 10; 100 LIQUID ORAL at 17:55

## 2017-01-01 RX ADMIN — FENTANYL CITRATE 100 MCG/HR: 50 INJECTION, SOLUTION INTRAMUSCULAR; INTRAVENOUS at 08:13

## 2017-01-01 RX ADMIN — ACETYLCYSTEINE 2 ML: 200 SOLUTION ORAL; RESPIRATORY (INHALATION) at 15:55

## 2017-01-01 RX ADMIN — METHOHEXITAL SODIUM 50 MG: 500 INJECTION, POWDER, LYOPHILIZED, FOR SOLUTION INTRAMUSCULAR; INTRAVENOUS; RECTAL at 16:38

## 2017-01-01 RX ADMIN — EPOPROSTENOL 20 NG/KG/MIN: 1.5 INJECTION, POWDER, LYOPHILIZED, FOR SOLUTION INTRAVENOUS at 05:16

## 2017-01-01 RX ADMIN — PIRFENIDONE 801 MG: 267 CAPSULE ORAL at 12:24

## 2017-01-01 RX ADMIN — LISINOPRIL 2.5 MG: 2.5 TABLET ORAL at 11:38

## 2017-01-01 RX ADMIN — PROPOFOL 50 MG: 10 INJECTION, EMULSION INTRAVENOUS at 15:34

## 2017-01-01 RX ADMIN — PIRFENIDONE 801 MG: 267 CAPSULE ORAL at 21:35

## 2017-01-01 RX ADMIN — VECURONIUM BROMIDE 1.2 MCG/KG/MIN: 1 INJECTION, POWDER, LYOPHILIZED, FOR SOLUTION INTRAVENOUS at 14:58

## 2017-01-01 RX ADMIN — PIRFENIDONE 801 MG: 267 CAPSULE ORAL at 18:03

## 2017-01-01 RX ADMIN — ALBUTEROL SULFATE 2.5 MG: 2.5 SOLUTION RESPIRATORY (INHALATION) at 04:16

## 2017-01-01 RX ADMIN — MULTIVITAMIN 15 ML: LIQUID ORAL at 07:50

## 2017-01-01 RX ADMIN — LORAZEPAM 1 MG: 2 INJECTION INTRAMUSCULAR; INTRAVENOUS at 11:48

## 2017-01-01 RX ADMIN — ACETYLCYSTEINE 2 ML: 200 SOLUTION ORAL; RESPIRATORY (INHALATION) at 19:51

## 2017-01-01 RX ADMIN — ALBUTEROL SULFATE 2.5 MG: 2.5 SOLUTION RESPIRATORY (INHALATION) at 20:29

## 2017-01-01 RX ADMIN — ACETYLCYSTEINE 2 ML: 200 SOLUTION ORAL; RESPIRATORY (INHALATION) at 04:21

## 2017-01-01 RX ADMIN — Medication 5 ML: at 08:40

## 2017-01-01 RX ADMIN — Medication 6 ML: at 15:15

## 2017-01-01 RX ADMIN — KETAMINE HYDROCHLORIDE 1 MG/KG/HR: 100 INJECTION, SOLUTION INTRAMUSCULAR; INTRAVENOUS at 16:10

## 2017-01-01 RX ADMIN — SODIUM CHLORIDE 1000 ML: 9 INJECTION, SOLUTION INTRAVENOUS at 15:23

## 2017-01-01 RX ADMIN — EPOPROSTENOL 20 NG/KG/MIN: 1.5 INJECTION, POWDER, LYOPHILIZED, FOR SOLUTION INTRAVENOUS at 23:08

## 2017-01-01 RX ADMIN — KETAMINE HYDROCHLORIDE 1 MG/KG/HR: 100 INJECTION, SOLUTION INTRAMUSCULAR; INTRAVENOUS at 17:12

## 2017-01-01 RX ADMIN — FENTANYL CITRATE 200 MCG/HR: 50 INJECTION, SOLUTION INTRAMUSCULAR; INTRAVENOUS at 15:37

## 2017-01-01 RX ADMIN — ONDANSETRON 4 MG: 2 INJECTION INTRAMUSCULAR; INTRAVENOUS at 11:06

## 2017-01-01 RX ADMIN — MULTIVITAMIN 15 ML: LIQUID ORAL at 07:28

## 2017-01-01 RX ADMIN — SPIRONOLACTONE 25 MG: 25 TABLET ORAL at 12:23

## 2017-01-01 RX ADMIN — ALBUTEROL SULFATE 2.5 MG: 2.5 SOLUTION RESPIRATORY (INHALATION) at 08:08

## 2017-01-01 RX ADMIN — SENNOSIDES A AND B 10 ML: 415.36 LIQUID ORAL at 07:50

## 2017-01-01 RX ADMIN — MORPHINE SULFATE 5 MG: 2 INJECTION, SOLUTION INTRAMUSCULAR; INTRAVENOUS at 18:11

## 2017-01-01 RX ADMIN — FENTANYL CITRATE 100 MCG/HR: 50 INJECTION, SOLUTION INTRAMUSCULAR; INTRAVENOUS at 18:06

## 2017-01-01 RX ADMIN — MINERAL OIL AND WHITE PETROLATUM: 150; 830 OINTMENT OPHTHALMIC at 13:34

## 2017-01-01 RX ADMIN — ACETYLCYSTEINE 2 ML: 200 SOLUTION ORAL; RESPIRATORY (INHALATION) at 09:00

## 2017-01-01 RX ADMIN — METOPROLOL SUCCINATE 25 MG: 25 TABLET, FILM COATED, EXTENDED RELEASE ORAL at 07:52

## 2017-01-01 RX ADMIN — GUAIFENESIN AND CODEINE PHOSPHATE 10 ML: 10; 100 LIQUID ORAL at 04:44

## 2017-01-01 RX ADMIN — KETAMINE HYDROCHLORIDE 1 MG/KG/HR: 50 INJECTION INTRAMUSCULAR; INTRAVENOUS at 07:23

## 2017-01-01 RX ADMIN — VECURONIUM BROMIDE 1.2 MCG/KG/MIN: 1 INJECTION, POWDER, LYOPHILIZED, FOR SOLUTION INTRAVENOUS at 06:38

## 2017-01-01 RX ADMIN — EPOPROSTENOL 20 NG/KG/MIN: 1.5 INJECTION, POWDER, LYOPHILIZED, FOR SOLUTION INTRAVENOUS at 02:58

## 2017-01-01 RX ADMIN — FENTANYL CITRATE 100 MCG: 50 INJECTION, SOLUTION INTRAMUSCULAR; INTRAVENOUS at 21:16

## 2017-01-01 RX ADMIN — POTASSIUM CHLORIDE 20 MEQ: 750 TABLET, EXTENDED RELEASE ORAL at 11:44

## 2017-01-01 RX ADMIN — SODIUM CHLORIDE 1000 ML: 9 INJECTION, SOLUTION INTRAVENOUS at 10:53

## 2017-01-01 RX ADMIN — GUAIFENESIN AND CODEINE PHOSPHATE 5 ML: 10; 100 LIQUID ORAL at 22:36

## 2017-01-01 RX ADMIN — VECURONIUM BROMIDE 1.2 MCG/KG/MIN: 1 INJECTION, POWDER, LYOPHILIZED, FOR SOLUTION INTRAVENOUS at 01:05

## 2017-01-01 RX ADMIN — GUAIFENESIN AND CODEINE PHOSPHATE 10 ML: 10; 100 LIQUID ORAL at 13:28

## 2017-01-01 RX ADMIN — EPOPROSTENOL 20 NG/KG/MIN: 1.5 INJECTION, POWDER, LYOPHILIZED, FOR SOLUTION INTRAVENOUS at 22:43

## 2017-01-01 RX ADMIN — MIDAZOLAM 2 MG: 1 INJECTION INTRAMUSCULAR; INTRAVENOUS at 16:17

## 2017-01-01 RX ADMIN — EPOPROSTENOL 20 NG/KG/MIN: 1.5 INJECTION, POWDER, LYOPHILIZED, FOR SOLUTION INTRAVENOUS at 20:42

## 2017-01-01 RX ADMIN — PIRFENIDONE 801 MG: 267 CAPSULE ORAL at 17:53

## 2017-01-01 RX ADMIN — FENTANYL CITRATE 100 MCG: 50 INJECTION, SOLUTION INTRAMUSCULAR; INTRAVENOUS at 10:35

## 2017-01-01 RX ADMIN — ALBUTEROL SULFATE 2.5 MG: 2.5 SOLUTION RESPIRATORY (INHALATION) at 16:33

## 2017-01-01 RX ADMIN — EPOPROSTENOL 20 NG/KG/MIN: 1.5 INJECTION, POWDER, LYOPHILIZED, FOR SOLUTION INTRAVENOUS at 20:34

## 2017-01-01 RX ADMIN — EPOPROSTENOL 20 NG/KG/MIN: 1.5 INJECTION, POWDER, LYOPHILIZED, FOR SOLUTION INTRAVENOUS at 10:00

## 2017-01-01 RX ADMIN — SENNOSIDES A AND B 10 ML: 415.36 LIQUID ORAL at 07:52

## 2017-01-01 RX ADMIN — ALBUTEROL SULFATE 2.5 MG: 2.5 SOLUTION RESPIRATORY (INHALATION) at 16:44

## 2017-01-01 RX ADMIN — Medication 5 ML: at 08:13

## 2017-01-01 RX ADMIN — ALBUTEROL SULFATE 2.5 MG: 2.5 SOLUTION RESPIRATORY (INHALATION) at 19:51

## 2017-01-01 RX ADMIN — EPOPROSTENOL 20 NG/KG/MIN: 1.5 INJECTION, POWDER, LYOPHILIZED, FOR SOLUTION INTRAVENOUS at 01:36

## 2017-01-01 RX ADMIN — FENTANYL CITRATE 100 MCG: 50 INJECTION, SOLUTION INTRAMUSCULAR; INTRAVENOUS at 17:14

## 2017-01-01 RX ADMIN — METHYLPREDNISOLONE SODIUM SUCCINATE 62.5 MG: 125 INJECTION, POWDER, LYOPHILIZED, FOR SOLUTION INTRAMUSCULAR; INTRAVENOUS at 09:26

## 2017-01-01 RX ADMIN — SENNOSIDES A AND B 10 ML: 415.36 LIQUID ORAL at 19:48

## 2017-01-01 RX ADMIN — LIDOCAINE HYDROCHLORIDE 75 MG: 20 INJECTION INTRAVENOUS at 17:13

## 2017-01-01 RX ADMIN — KETAMINE HYDROCHLORIDE 1 MG/KG/HR: 100 INJECTION, SOLUTION INTRAMUSCULAR; INTRAVENOUS at 05:50

## 2017-01-01 RX ADMIN — FUROSEMIDE 10 MG: 10 INJECTION INTRAMUSCULAR; INTRAVENOUS at 05:40

## 2017-01-01 RX ADMIN — OMEPRAZOLE 20 MG: 20 CAPSULE, DELAYED RELEASE ORAL at 07:52

## 2017-01-01 RX ADMIN — MULTIVITAMIN 15 ML: LIQUID ORAL at 08:50

## 2017-01-01 RX ADMIN — LORAZEPAM 1 MG: 2 INJECTION INTRAMUSCULAR; INTRAVENOUS at 12:56

## 2017-01-01 RX ADMIN — PIRFENIDONE 801 MG: 267 CAPSULE ORAL at 09:06

## 2017-01-01 RX ADMIN — LIDOCAINE HYDROCHLORIDE 3 ML: 10 INJECTION, SOLUTION EPIDURAL; INFILTRATION; INTRACAUDAL; PERINEURAL at 19:45

## 2017-01-01 RX ADMIN — FENTANYL CITRATE 200 MCG: 50 INJECTION, SOLUTION INTRAMUSCULAR; INTRAVENOUS at 11:00

## 2017-01-01 RX ADMIN — SODIUM CHLORIDE 250 ML: 9 INJECTION, SOLUTION INTRAVENOUS at 06:23

## 2017-01-01 RX ADMIN — DEXTROSE AND SODIUM CHLORIDE: 5; 900 INJECTION, SOLUTION INTRAVENOUS at 12:17

## 2017-01-01 RX ADMIN — FENTANYL CITRATE 100 MCG: 50 INJECTION INTRAMUSCULAR; INTRAVENOUS at 16:57

## 2017-01-01 RX ADMIN — ACETYLCYSTEINE 2 ML: 200 SOLUTION ORAL; RESPIRATORY (INHALATION) at 04:16

## 2017-01-01 RX ADMIN — SENNOSIDES A AND B 10 ML: 415.36 LIQUID ORAL at 20:29

## 2017-01-01 RX ADMIN — KETAMINE HCL-NACL SOLN PREF SY 50 MG/5ML-0.9% (10MG/ML) 50 MG: 10 SOLUTION PREFILLED SYRINGE at 19:08

## 2017-01-01 RX ADMIN — FUROSEMIDE 10 MG: 10 INJECTION, SOLUTION INTRAVENOUS at 22:57

## 2017-01-01 RX ADMIN — ACETYLCYSTEINE 2 ML: 200 SOLUTION ORAL; RESPIRATORY (INHALATION) at 04:37

## 2017-01-01 RX ADMIN — LISINOPRIL 2.5 MG: 2.5 TABLET ORAL at 08:28

## 2017-01-01 RX ADMIN — DIPHENHYDRAMINE HYDROCHLORIDE, ZINC ACETATE: 2; .1 CREAM TOPICAL at 13:28

## 2017-01-01 RX ADMIN — GUAIFENESIN AND CODEINE PHOSPHATE 5 ML: 10; 100 LIQUID ORAL at 22:34

## 2017-01-01 RX ADMIN — OMEPRAZOLE 20 MG: 20 CAPSULE, DELAYED RELEASE ORAL at 09:11

## 2017-01-01 RX ADMIN — PIPERACILLIN AND TAZOBACTAM 3.38 G: 3; .375 INJECTION, POWDER, LYOPHILIZED, FOR SOLUTION INTRAVENOUS; PARENTERAL at 04:56

## 2017-01-01 RX ADMIN — DEXTROSE AND SODIUM CHLORIDE: 5; 900 INJECTION, SOLUTION INTRAVENOUS at 03:16

## 2017-01-01 RX ADMIN — Medication 5 ML: at 09:23

## 2017-01-01 RX ADMIN — HYDROMORPHONE HYDROCHLORIDE 0.2 MG: 10 INJECTION, SOLUTION INTRAMUSCULAR; INTRAVENOUS; SUBCUTANEOUS at 13:31

## 2017-01-01 RX ADMIN — LORAZEPAM 1 MG: 2 INJECTION INTRAMUSCULAR; INTRAVENOUS at 19:00

## 2017-01-01 RX ADMIN — MORPHINE SULFATE 2 MG: 2 INJECTION, SOLUTION INTRAMUSCULAR; INTRAVENOUS at 07:18

## 2017-01-01 RX ADMIN — LORAZEPAM 1 MG: 2 INJECTION INTRAMUSCULAR; INTRAVENOUS at 11:24

## 2017-01-01 RX ADMIN — EPOPROSTENOL 20 NG/KG/MIN: 1.5 INJECTION, POWDER, LYOPHILIZED, FOR SOLUTION INTRAVENOUS at 16:41

## 2017-01-01 RX ADMIN — LORAZEPAM 1 MG: 2 INJECTION INTRAMUSCULAR; INTRAVENOUS at 14:18

## 2017-01-01 RX ADMIN — FENTANYL CITRATE 100 MCG/HR: 50 INJECTION, SOLUTION INTRAMUSCULAR; INTRAVENOUS at 16:56

## 2017-01-01 RX ADMIN — POTASSIUM CHLORIDE 20 MEQ: 750 TABLET, EXTENDED RELEASE ORAL at 13:48

## 2017-01-01 RX ADMIN — ALBUTEROL SULFATE 2.5 MG: 2.5 SOLUTION RESPIRATORY (INHALATION) at 13:41

## 2017-01-01 RX ADMIN — EPOPROSTENOL 20 NG/KG/MIN: 1.5 INJECTION, POWDER, LYOPHILIZED, FOR SOLUTION INTRAVENOUS at 06:38

## 2017-01-01 RX ADMIN — FENTANYL CITRATE 100 MCG: 50 INJECTION, SOLUTION INTRAMUSCULAR; INTRAVENOUS at 11:05

## 2017-01-01 RX ADMIN — FENTANYL CITRATE 100 MCG: 50 INJECTION, SOLUTION INTRAMUSCULAR; INTRAVENOUS at 03:39

## 2017-01-01 RX ADMIN — METHYLPREDNISOLONE SODIUM SUCCINATE 62.5 MG: 125 INJECTION, POWDER, LYOPHILIZED, FOR SOLUTION INTRAMUSCULAR; INTRAVENOUS at 09:30

## 2017-01-01 RX ADMIN — MULTIVITAMIN 15 ML: LIQUID ORAL at 08:30

## 2017-01-01 RX ADMIN — FENTANYL CITRATE 100 MCG/HR: 50 INJECTION, SOLUTION INTRAMUSCULAR; INTRAVENOUS at 03:42

## 2017-01-01 RX ADMIN — FENTANYL CITRATE 200 MCG/HR: 50 INJECTION, SOLUTION INTRAMUSCULAR; INTRAVENOUS at 20:00

## 2017-01-01 RX ADMIN — MIDAZOLAM HYDROCHLORIDE 9 MG/HR: 5 INJECTION, SOLUTION INTRAMUSCULAR; INTRAVENOUS at 05:45

## 2017-01-01 RX ADMIN — FENTANYL CITRATE 200 MCG: 50 INJECTION, SOLUTION INTRAMUSCULAR; INTRAVENOUS at 19:08

## 2017-01-01 RX ADMIN — MIDAZOLAM HYDROCHLORIDE 6 MG/HR: 5 INJECTION, SOLUTION INTRAMUSCULAR; INTRAVENOUS at 12:01

## 2017-01-01 RX ADMIN — FENTANYL CITRATE 200 MCG: 50 INJECTION, SOLUTION INTRAMUSCULAR; INTRAVENOUS at 15:45

## 2017-01-01 RX ADMIN — EPOPROSTENOL 20 NG/KG/MIN: 1.5 INJECTION, POWDER, LYOPHILIZED, FOR SOLUTION INTRAVENOUS at 10:20

## 2017-01-01 RX ADMIN — KETAMINE HYDROCHLORIDE 1 MG/KG/HR: 100 INJECTION, SOLUTION INTRAMUSCULAR; INTRAVENOUS at 16:14

## 2017-01-01 RX ADMIN — FENTANYL CITRATE 100 MCG/HR: 50 INJECTION, SOLUTION INTRAMUSCULAR; INTRAVENOUS at 03:39

## 2017-01-01 RX ADMIN — MINERAL OIL AND WHITE PETROLATUM: 150; 830 OINTMENT OPHTHALMIC at 05:53

## 2017-01-01 RX ADMIN — ACETYLCYSTEINE 2 ML: 200 SOLUTION ORAL; RESPIRATORY (INHALATION) at 13:41

## 2017-01-01 RX ADMIN — NYSTATIN 500000 UNITS: 500000 SUSPENSION ORAL at 19:42

## 2017-01-01 RX ADMIN — PANTOPRAZOLE SODIUM 40 MG: 40 INJECTION, POWDER, FOR SOLUTION INTRAVENOUS at 08:05

## 2017-01-01 RX ADMIN — DIPHENHYDRAMINE HYDROCHLORIDE, ZINC ACETATE: 2; .1 CREAM TOPICAL at 21:17

## 2017-01-01 RX ADMIN — ACETYLCYSTEINE 2 ML: 200 SOLUTION ORAL; RESPIRATORY (INHALATION) at 08:08

## 2017-01-01 RX ADMIN — FENTANYL CITRATE 200 MCG: 50 INJECTION, SOLUTION INTRAMUSCULAR; INTRAVENOUS at 16:40

## 2017-01-01 RX ADMIN — DIPHENHYDRAMINE HYDROCHLORIDE, ZINC ACETATE: 2; .1 CREAM TOPICAL at 15:57

## 2017-01-01 RX ADMIN — METHYLPREDNISOLONE SODIUM SUCCINATE 125 MG: 125 INJECTION, POWDER, LYOPHILIZED, FOR SOLUTION INTRAMUSCULAR; INTRAVENOUS at 10:26

## 2017-01-01 RX ADMIN — ALBUTEROL SULFATE 2.5 MG: 2.5 SOLUTION RESPIRATORY (INHALATION) at 12:02

## 2017-01-01 RX ADMIN — MIDAZOLAM HYDROCHLORIDE 9 MG/HR: 5 INJECTION, SOLUTION INTRAMUSCULAR; INTRAVENOUS at 23:30

## 2017-01-01 RX ADMIN — FENTANYL CITRATE 200 MCG/HR: 50 INJECTION, SOLUTION INTRAMUSCULAR; INTRAVENOUS at 18:54

## 2017-01-01 RX ADMIN — MIDAZOLAM HYDROCHLORIDE 9 MG/HR: 5 INJECTION, SOLUTION INTRAMUSCULAR; INTRAVENOUS at 11:11

## 2017-01-01 RX ADMIN — FENTANYL CITRATE 100 MCG: 50 INJECTION, SOLUTION INTRAMUSCULAR; INTRAVENOUS at 14:30

## 2017-01-01 RX ADMIN — EPOPROSTENOL 20 NG/KG/MIN: 1.5 INJECTION, POWDER, LYOPHILIZED, FOR SOLUTION INTRAVENOUS at 19:45

## 2017-01-01 RX ADMIN — NYSTATIN 500000 UNITS: 500000 SUSPENSION ORAL at 07:52

## 2017-01-01 RX ADMIN — ACETYLCYSTEINE 2 ML: 200 SOLUTION ORAL; RESPIRATORY (INHALATION) at 00:28

## 2017-01-01 RX ADMIN — NYSTATIN 500000 UNITS: 500000 SUSPENSION ORAL at 17:14

## 2017-01-01 RX ADMIN — EPOPROSTENOL 20 NG/KG/MIN: 1.5 INJECTION, POWDER, LYOPHILIZED, FOR SOLUTION INTRAVENOUS at 03:57

## 2017-01-01 RX ADMIN — SODIUM PHOSPHATE, MONOBASIC, MONOHYDRATE AND SODIUM PHOSPHATE, DIBASIC, ANHYDROUS 15 MMOL: 276; 142 INJECTION, SOLUTION INTRAVENOUS at 06:53

## 2017-01-01 RX ADMIN — MULTIVITAMIN 15 ML: LIQUID ORAL at 07:52

## 2017-01-01 RX ADMIN — VECURONIUM BROMIDE 1.2 MCG/KG/MIN: 1 INJECTION, POWDER, LYOPHILIZED, FOR SOLUTION INTRAVENOUS at 21:25

## 2017-01-01 RX ADMIN — PANTOPRAZOLE SODIUM 40 MG: 40 INJECTION, POWDER, FOR SOLUTION INTRAVENOUS at 16:41

## 2017-01-01 RX ADMIN — PANTOPRAZOLE SODIUM 40 MG: 40 INJECTION, POWDER, FOR SOLUTION INTRAVENOUS at 09:44

## 2017-01-01 RX ADMIN — MINERAL OIL AND WHITE PETROLATUM: 150; 830 OINTMENT OPHTHALMIC at 22:26

## 2017-01-01 RX ADMIN — Medication 5 ML: at 17:13

## 2017-01-01 RX ADMIN — MORPHINE SULFATE 2 MG: 2 INJECTION, SOLUTION INTRAMUSCULAR; INTRAVENOUS at 13:45

## 2017-01-01 RX ADMIN — WARFARIN SODIUM 5 MG: 5 TABLET ORAL at 17:35

## 2017-01-01 RX ADMIN — FENTANYL CITRATE 100 MCG: 50 INJECTION INTRAMUSCULAR; INTRAVENOUS at 16:30

## 2017-01-01 RX ADMIN — SENNOSIDES 2 TABLET: 8.6 TABLET, FILM COATED ORAL at 10:01

## 2017-01-01 RX ADMIN — KETAMINE HYDROCHLORIDE 1 MG/KG/HR: 100 INJECTION, SOLUTION INTRAMUSCULAR; INTRAVENOUS at 17:59

## 2017-01-01 RX ADMIN — EPOPROSTENOL 20 NG/KG/MIN: 1.5 INJECTION, POWDER, LYOPHILIZED, FOR SOLUTION INTRAVENOUS at 07:50

## 2017-01-01 RX ADMIN — ACETYLCYSTEINE 2 ML: 200 SOLUTION ORAL; RESPIRATORY (INHALATION) at 20:29

## 2017-01-01 RX ADMIN — ALBUTEROL SULFATE 2.5 MG: 2.5 SOLUTION RESPIRATORY (INHALATION) at 00:33

## 2017-01-01 RX ADMIN — MINERAL OIL AND WHITE PETROLATUM: 150; 830 OINTMENT OPHTHALMIC at 15:41

## 2017-01-01 RX ADMIN — ALBUTEROL SULFATE 2.5 MG: 2.5 SOLUTION RESPIRATORY (INHALATION) at 08:21

## 2017-01-01 RX ADMIN — VECURONIUM BROMIDE 10 MG: 1 INJECTION, POWDER, LYOPHILIZED, FOR SOLUTION INTRAVENOUS at 13:48

## 2017-01-01 RX ADMIN — HYDROMORPHONE HYDROCHLORIDE 0.2 MG: 10 INJECTION, SOLUTION INTRAMUSCULAR; INTRAVENOUS; SUBCUTANEOUS at 11:33

## 2017-01-01 RX ADMIN — SENNOSIDES A AND B 10 ML: 415.36 LIQUID ORAL at 09:26

## 2017-01-01 RX ADMIN — ALBUTEROL SULFATE 2.5 MG: 2.5 SOLUTION RESPIRATORY (INHALATION) at 12:51

## 2017-01-01 RX ADMIN — HYDROMORPHONE HYDROCHLORIDE 0.2 MG: 10 INJECTION, SOLUTION INTRAMUSCULAR; INTRAVENOUS; SUBCUTANEOUS at 19:41

## 2017-01-01 RX ADMIN — EPOPROSTENOL 20 NG/KG/MIN: 1.5 INJECTION, POWDER, LYOPHILIZED, FOR SOLUTION INTRAVENOUS at 13:40

## 2017-01-01 RX ADMIN — FENTANYL CITRATE 100 MCG: 50 INJECTION, SOLUTION INTRAMUSCULAR; INTRAVENOUS at 22:40

## 2017-01-01 RX ADMIN — KETAMINE HCL-NACL SOLN PREF SY 50 MG/5ML-0.9% (10MG/ML) 25 MG: 10 SOLUTION PREFILLED SYRINGE at 17:06

## 2017-01-01 ASSESSMENT — ENCOUNTER SYMPTOMS
SKIN CHANGES: 0
EYE REDNESS: 0
ABDOMINAL PAIN: 0
SNORES LOUDLY: 0
HYPOTENSION: 0
DIARRHEA: 0
EYE WATERING: 0
POSTURAL DYSPNEA: 0
RECTAL BLEEDING: 0
CONFUSION: 0
NIGHT SWEATS: 0
WEIGHT GAIN: 0
CLAUDICATION: 0
DYSPNEA ON EXERTION: 1
NECK STIFFNESS: 0
HYPERTENSION: 0
APPETITE CHANGE: 1
COUGH: 1
EYE REDNESS: 0
SINUS PRESSURE: 0
SORE THROAT: 0
DYSURIA: 0
PALPITATIONS: 0
HALLUCINATIONS: 0
FEVER: 0
FATIGUE: 1
RESPIRATORY PAIN: 0
HEADACHES: 0
BOWEL INCONTINENCE: 0
SHORTNESS OF BREATH: 1
NERVOUS/ANXIOUS: 1
CONSTIPATION: 0
LEG PAIN: 0
FEVER: 0
DIFFICULTY URINATING: 0
SEIZURES: 0
EYE IRRITATION: 0
PANIC: 0
ABDOMINAL PAIN: 0
POLYDIPSIA: 0
NERVOUS/ANXIOUS: 0
VOMITING: 0
ARTHRALGIAS: 0
DYSRHYTHMIAS: 1
COLOR CHANGE: 0
BLOOD IN STOOL: 0
LEG SWELLING: 0
WEAKNESS: 1
NECK STIFFNESS: 0
CHILLS: 1
FATIGUE: 1
CHEST TIGHTNESS: 1
INCREASED ENERGY: 1
FEVER: 0
SYNCOPE: 0
HEARTBURN: 0
EYE PAIN: 0
COUGH: 1
NAUSEA: 1
DEPRESSION: 0
ABDOMINAL PAIN: 0
ALTERED TEMPERATURE REGULATION: 1
COLOR CHANGE: 0
WHEEZING: 0
CONSTIPATION: 0
HEADACHES: 0
POOR WOUND HEALING: 0
VOMITING: 0
INSOMNIA: 0
WHEEZING: 0
BLOATING: 0
POLYPHAGIA: 0
DOUBLE VISION: 0
FLANK PAIN: 0
NAIL CHANGES: 0
WEIGHT LOSS: 0
EXERCISE INTOLERANCE: 1
DECREASED CONCENTRATION: 1
EYE REDNESS: 0
ARTHRALGIAS: 0
VOMITING: 0
CHILLS: 1
ACTIVITY CHANGE: 1
SPUTUM PRODUCTION: 0
NAUSEA: 0
SLEEP DISTURBANCES DUE TO BREATHING: 0
TACHYCARDIA: 0
SHORTNESS OF BREATH: 1
CONFUSION: 0
SHORTNESS OF BREATH: 1
COUGH DISTURBING SLEEP: 1
LIGHT-HEADEDNESS: 0
CHEST TIGHTNESS: 1
DYSPHORIC MOOD: 1
BLOOD IN STOOL: 0
HEMOPTYSIS: 0
NAUSEA: 0
DIFFICULTY URINATING: 0
DECREASED APPETITE: 0
ORTHOPNEA: 0
DECREASED CONCENTRATION: 0
RECTAL PAIN: 0
JAUNDICE: 0

## 2017-01-01 ASSESSMENT — ACTIVITIES OF DAILY LIVING (ADL)
FALL_HISTORY_WITHIN_LAST_SIX_MONTHS: NO
SWALLOWING: 0-->SWALLOWS FOODS/LIQUIDS WITHOUT DIFFICULTY
TOILETING: 2-->ASSISTIVE PERSON
RETIRED_EATING: 0-->INDEPENDENT
AMBULATION: 2-->ASSISTIVE PERSON
TRANSFERRING: 2-->ASSISTIVE PERSON
WHICH_OF_THE_ABOVE_FUNCTIONAL_RISKS_HAD_A_RECENT_ONSET_OR_CHANGE?: AMBULATION;TRANSFERRING;TOILETING;BATHING;DRESSING
DRESS: 2-->ASSISTIVE PERSON
BATHING: 2-->ASSISTIVE PERSON
RETIRED_COMMUNICATION: 0-->UNDERSTANDS/COMMUNICATES WITHOUT DIFFICULTY
COGNITION: 0 - NO COGNITION ISSUES REPORTED

## 2017-01-01 ASSESSMENT — PAIN SCALES - GENERAL
PAINLEVEL: NO PAIN (0)

## 2017-01-01 ASSESSMENT — PAIN DESCRIPTION - DESCRIPTORS: DESCRIPTORS: DISCOMFORT;OTHER (COMMENT)

## 2017-01-05 NOTE — Clinical Note
1/5/2017       RE: Sophia Johnson  [address] [facility] [address]     Dear Colleague,    Thank you for referring your patient, Sophia Johnson, to the [facility] at [facility]. Please see a copy of my visit note below.    No notes on file    Again, thank you for allowing me to participate in the care of your patient.      Sincerely,    Lavinia Bowman MD

## 2017-01-05 NOTE — MR AVS SNAPSHOT
"              After Visit Summary   1/5/2017    Sophia Johnson    MRN: [id number]           Patient Information     Date Of Birth          1945        Visit Information        Provider Department      1/5/2017 9:30 AM Lavinia Bowman MD [facility]        Today's Diagnoses     IPF (idiopathic pulmonary fibrosis) (H)    -  1       Care Instructions    Pulmonary Rehab: Please remember to stay active.  Continue exercises learned in pulmonary rehab or continue participating in pulmonary rehab, if able.     Current oxygen use: Rest 6 liters  Activity  6-8 liters Sleep 6 liters     5' 9\" 198 lbs 0 oz Body mass index is 29.23 kg/(m^2).  Goal BMI greater than 18, less than 30 for lung transplant.     Medication changes:  No change    Future orders: return in 3 months  Antibody blood test (PRA) due every 3 months:  Due next visit   Please remember to stay up to date with your primary care requirements:      Thank-you for allowing us to participate in your care.    If your condition should change, please contact your transplant coordinator. This includes: worsening symptoms, need for antibiotics, hospitalizations, transfusions.    Thoracic Transplant Office phone [phone], option 2, fax [phone]    Office Hours 8:30 - 5:00 pm              Follow-ups after your visit        Follow-up notes from your care team     Return in about 3 months (around 4/5/2017).      Your next 10 appointments already scheduled     Jan 09, 2017  1:00 PM   Ech Complete with [id number]   [facility] ([facility])    [address] [place] [address] [phone]           1.  Please bring or wear a comfortable two-piece outfit. 2.  You may eat, drink and take your normal medicines. 3.  For any questions that cannot be answered, please contact the ordering physician            Jan 09, 2017  2:00 PM   (Arrive by 1:45 PM)   RETURN HEART FAILURE with " Mario Martin MD   [facility] ([facility])    [address]  3rd Floor  [facility] [address] [phone]              Future tests that were ordered for you today     Open Future Orders        Priority Expected Expires Ordered    Comprehensive metabolic panel Routine 4/5/2017 1/5/2018 1/5/2017    CBC with platelets differential Routine 4/5/2017 1/5/2018 1/5/2017    PRA Single Antigen IgG Antibody Routine 4/5/2017 1/5/2018 1/5/2017    Blood gas venous Routine 4/5/2017 1/5/2018 1/5/2017    6 minute walk test Routine 4/5/2017 1/5/2018 1/5/2017    Pulmonary Function Test Routine 4/5/2017 1/5/2018 1/5/2017            Who to contact     If you have questions or need follow up information about today's clinic visit or your schedule please contact [facility] directly at [phone].  Normal or non-critical lab and imaging results will be communicated to you by PAIEONhart, letter or phone within 4 business days after the clinic has received the results. If you do not hear from us within 7 days, please contact the clinic through [employer] or phone. If you have a critical or abnormal lab result, we will notify you by phone as soon as possible.  Submit refill requests through [employer] or call your pharmacy and they will forward the refill request to us. Please allow 3 business days for your refill to be completed.          Additional Information About Your Visit        [employer] Information     [employer] gives you secure access to your electronic health record. If you see a primary care provider, you can also send messages to your care team and make appointments. If you have questions, please call your primary care clinic.  If you do not have a primary care provider, please call [phone] and they will assist you.        Care EveryWhere ID     This is your Care EveryWhere ID. This could be used by other organizations to access your [place]  "medical records  [id number]        Your Vitals Were     Pulse Respirations Height BMI (Body Mass Index) Pulse Oximetry       80 16 1.753 m (5' 9\") 29.23 kg/m2 100%        Blood Pressure from Last 3 Encounters:   01/05/17 97/68   09/22/16 97/64   06/24/16 89/62    Weight from Last 3 Encounters:   01/05/17 89.812 kg (198 lb)   09/22/16 89.812 kg (198 lb)   09/07/16 89.812 kg (198 lb)               Primary Care Provider Office Phone # Fax #    Lesley Christianson [phone]       [address] [address]        Thank you!     Thank you for choosing [facility]  for your care. Our goal is always to provide you with excellent care. Hearing back from our patients is one way we can continue to improve our services. Please take a few minutes to complete the written survey that you may receive in the mail after your visit with us. Thank you!             Your Updated Medication List - Protect others around you: Learn how to safely use, store and throw away your medicines at www.disposemymeds.org.          This list is accurate as of: 1/5/17 10:32 AM.  Always use your most recent med list.                   Brand Name Dispense Instructions for use    alendronate 70 MG tablet    FOSAMAX    4 tablet    Take 1 tablet (70 mg) by mouth every 7 days       cholecalciferol 1000 UNIT tablet    vitamin D     Take 2,000 Units by mouth Once daily       lisinopril 2.5 MG tablet    PRINIVIL/Zestril    90 tablet    Take 1 tablet (2.5 mg) by mouth daily       metoprolol 25 MG 24 hr tablet    TOPROL-XL    90 tablet    TAKE ONE TABLET BY MOUTH ONE TIME DAILY       * omeprazole 20 MG CR capsule    priLOSEC    90 capsule    Take 1 capsule (20 mg) by mouth daily       * omeprazole 20 MG CR capsule    priLOSEC    90 capsule    Take 1 capsule (20 mg) by mouth daily       * order for DME     1 Device    Please provide patient with second liquid concentrator for " filling smaller tanks.  Patient goes through more oxygen since recent hospitalization and needs a second unit at home.       * order for DME     1 Device    Please provide patient with POC (portable oxygen concentrator).  Patient currently uses 2-3 LPM continuous flow oxygen.       * order for DME     1 Device    Patient currently gets oxygen form [employer].  Please provide new liquid system to meet increased need.  Patient requires 8 liters oxygen via nasal cannula with oximyzer to maintain oxygen saturation above 88% with activity.  This is for lifetime use.       pirfenidone 267 MG capsule    ESBRIET    270 capsule    Take 3 capsules (801 mg) by mouth 3 times daily (with meals)       spironolactone 25 MG tablet    ALDACTONE    90 tablet    TAKE ONE TABLET BY MOUTH EVERY DAY       SUNSCREEN SPF50 Lotn     250 mL    Apply as needed.       torsemide 10 MG tablet    DEMADEX    90 tablet    Take 1 tablet (10 mg) by mouth daily as needed       traMADol 50 MG tablet    ULTRAM    40 tablet    Take 1-2 tabs three times daily for pain as needed       VITAMIN C PO          * warfarin 7.5 MG tablet    COUMADIN    30 tablet    Take 1 tablet (7.5 mg) by mouth daily       * warfarin 5 MG tablet    COUMADIN    90 tablet    Take 5 mg daily or as instructed by coumadin clinic nurse       * Notice:  This list has 7 medication(s) that are the same as other medications prescribed for you. Read the directions carefully, and ask your doctor or other care provider to review them with you.

## 2017-01-05 NOTE — NURSING NOTE
Chief Complaint   Patient presents with     Breathing Problem     Sophia is here today to see Dr. Bowman about her IPF     Ella Levin, MIGUEL on 1/5/2017

## 2017-01-05 NOTE — PATIENT INSTRUCTIONS
"Pulmonary Rehab: Please remember to stay active.  Continue exercises learned in pulmonary rehab or continue participating in pulmonary rehab, if able.     Current oxygen use: Rest 6 liters  Activity  6-8 liters Sleep 6 liters     5' 9\" 198 lbs 0 oz Body mass index is 29.23 kg/(m^2).  Goal BMI greater than 18, less than 30 for lung transplant.     Medication changes:  No change    Future orders: return in 3 months  Antibody blood test (PRA) due every 3 months:  Due next visit   Please remember to stay up to date with your primary care requirements:      Thank-you for allowing us to participate in your care.    If your condition should change, please contact your transplant coordinator. This includes: worsening symptoms, need for antibiotics, hospitalizations, transfusions.    Thoracic Transplant Office phone [phone], option 2, fax [phone]    Office Hours 8:30 - 5:00 pm        "

## 2017-01-05 NOTE — NURSING NOTE
"Patient accompanied by:   Current activity level: has to stop 2-3 times to go up 2 flights stairs  Pulmonary Rehab: has not done for a few years, patient is not sure it would be helpful  Recommendations: discussed considering pulmonary rehab  Patient status assessment updated.    Current oxygen use:  Uses 6 liters at baseline all the time   Diabetic status: not diabetic   5' 9\" 198 lbs 0 oz Body mass index is 29.23 kg/(m^2).    Medication changes: no change    Pat is concerned about her disease progression and knows her HLA antibodies are limiting potential donor offers.  She is interested in considering other treatment options if available, specifically asked about stem cell transplant.  Confirmed will contact her if Dr. Bowman has any recommendations.   "

## 2017-01-05 NOTE — PROGRESS NOTES
[facility]    Reason for Visit  Sophia Johnson is a 71 year old year old female who is being seen for Breathing Problem    HPI    Mrs. Johnson is a 71-year-old with idiopathic pulmonary fibrosis who is here for followup.  She is currently on the lung transplant list; however, the cory is that her PRA is 100%, so finding a match is very difficult.  She reports continued worsening of her dyspnea with exertion.  She does feel winded when she walks up a flight of stairs.  Her oxygen use at home varies from 6 liters up to 8 liters and she does use an Oxymizer at home.  She had some questions about the tubing of the cannula and if there is a tubing that is more comfortable.  She does endorse a persistent dry cough.  She takes pirfenidone for IPF at the full dose of 3 tablets 3 times a day.  She has noticed no side effects with this.  She did receive the flu vaccine.  She has an appointment with Dr. Martin next week for evaluation of her PFO and history of a-fib.             Current Outpatient Prescriptions   Medication     metoprolol (TOPROL-XL) 25 MG 24 hr tablet     pirfenidone (ESBRIET) 267 MG capsule     warfarin (COUMADIN) 5 MG tablet     omeprazole (PRILOSEC) 20 MG capsule     traMADol (ULTRAM) 50 MG tablet     SUNSCREEN SPF50 LOTN     lisinopril (PRINIVIL,ZESTRIL) 2.5 MG tablet     alendronate (FOSAMAX) 70 MG tablet     spironolactone (ALDACTONE) 25 MG tablet     Ascorbic Acid (VITAMIN C PO)     omeprazole (PRILOSEC) 20 MG capsule     order for DME     warfarin (COUMADIN) 7.5 MG tablet     order for DME     torsemide (DEMADEX) 10 MG tablet     ORDER FOR DME     cholecalciferol (VITAMIN D3) 1000 UNIT tablet     No current facility-administered medications for this visit.     Allergies   Allergen Reactions     Vicodin [Hydrocodone-Acetaminophen]      Headache       Past Medical History   Diagnosis Date     Fall against object 2/2005     Fell 35 feet     LBP (low  back pain)      On home oxygen therapy      uses when walking >3 minutes     Coughing      IPF (idiopathic pulmonary fibrosis) (H)      chest CT  showed pulm fibrosis but not diagnostic of IPF; VATS R lung bx 2013 +UIP; RAINIER simutuzumab study until ; pirfenidone started .     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 2010     kyphoplasty, vertebroplasty     Right wrist surgery       Colposcopy cervix, biopsy cervix, endocervical curettage, combined  age?     Reported Benign      Foot surgery       L foot tendon     Thoracoscopic wedge resection lung  2013     Procedure: THORACOSCOPIC WEDGE RESECTION LUNG;  Right Thoracscopic Wedge Resection Anesthesia General with block;  Surgeon: José Peralta MD;  Location: [facility] OR     Anesthesia cardioversion N/A 6/3/2015     Procedure: ANESTHESIA CARDIOVERSION;  Surgeon: GENERIC ANESTHESIA PROVIDER;  Location: [facility] OR      esoph/gas reflux test w nasal imped >1 hr N/A 3/17/2016     Procedure: ESOPHAGEAL IMPEDENCE FUNCTION TEST WITH 24 HOUR PH GREATER THAN 1 HOUR;  Surgeon: Gonzalo Reveles MD;  Location: [facility]     Transplant         Social History     Social History     Marital Status:      Spouse Name: N/A     Number of Children: 2     Years of Education: N/A     Occupational History      [employer] [place]     works in the laboratory     Social History Main Topics     Smoking status: Passive Smoke Exposure - Never Smoker -- 2 years     Types:  "Cigarettes     Smokeless tobacco: Never Used      Comment: Smoked in college, a cigarette here and there     Alcohol Use: No      Comment: 3 drinks a week     Drug Use: No     Sexual Activity:     Partners: Male     Other Topics Concern     Parent/Sibling W/ Cabg, Mi Or Angioplasty Before 65f 55m? No     Social History Narrative     and  at [employer] [place]       Family History   Problem Relation Age of Onset     Breast Cancer Mother      CANCER Mother      Breast Cancer     CANCER Father      bone and liver     CEREBROVASCULAR DISEASE Maternal Grandfather              ROS Pulmonary  A complete ROS was otherwise negative except as noted in the HPI.    Vitals: BP 97/68 mmHg  Pulse 80  Resp 16  Ht 1.753 m (5' 9\")  Wt 89.812 kg (198 lb)  BMI 29.23 kg/m2  SpO2 100%    Exam:   GENERAL APPEARANCE: Well developed, well nourished, alert, and in no apparent distress.  RESP: good air flow throughout.  Bibasilar inspiratory crackles. No rhonchi. No wheezes.  CV: Normal S1, S2, regular rhythm, normal rate. No murmur.  No LE edema.   MS: extremities normal. No clubbing. No cyanosis.  SKIN: no rash on limited exam.  NEURO: Mentation intact, speech normal, normal gait and stance.  PSYCH: mentation appears normal. and affect normal/bright.    Results:  Recent Results (from the past 168 hour(s))   INR    Collection Time: 01/05/17  8:33 AM   Result Value Ref Range    INR 1.68 (H) 0.86 - 1.14   General PFT Lab (Please always keep checked)    Collection Time: 01/05/17  8:45 AM   Result Value Ref Range    FVC-Pred 3.33 L    FVC-Pre 2.44 L    FVC-%Pred-Pre 73 %    FEV1-Pre 2.19 L    FEV1-%Pred-Pre 85 %    FEV1FVC-Pred 78 %    FEV1FVC-Pre 90 %    FEFMax-Pred 6.39 L/sec    FEFMax-Pre 7.09 L/sec    FEFMax-%Pred-Pre 110 %    FEF2575-Pred 2.08 L/sec    FEF2575-Pre 3.24 L/sec    QNR5849-%Pred-Pre 155 %    ExpTime-Pre 5.06 sec    FIFMax-Pre 4.97 L/sec    VC-Pred 3.62 L    VC-Pre 2.37 L    VC-%Pred-Pre 65 %    IC-Pred 2.96 " L    IC-Pre 1.80 L    IC-%Pred-Pre 60 %    ERV-Pred 0.66 L    ERV-Pre 0.58 L    ERV-%Pred-Pre 87 %    FEV1FEV6-Pred 79 %    FEV1FEV6-Pre 90 %    FRCPleth-Pred 2.95 L    FRCPleth-Pre 2.02 L    FRCPleth-%Pred-Pre 68 %    RVPleth-Pred 2.24 L    RVPleth-Pre 1.44 L    RVPleth-%Pred-Pre 64 %    TLCPleth-Pred 5.65 L    TLCPleth-Pre 3.82 L    TLCPleth-%Pred-Pre 67 %    DLCOunc-Pred 22.34 ml/min/mmHg    DLCOunc-Pre 6.72 ml/min/mmHg    DLCOunc-%Pred-Pre 30 %    VA-Pre 3.04 L    VA-%Pred-Pre 52 %    FEV1SVC-Pred 71 %    FEV1SVC-Pre 92 %       I reviewed the pulmonary function test that was performed today.  This shows mild restrictive lung disease with a severely reduced diffusion.  There has been a steady decline in PFT and pulmonary function over the past year.     I reviewed results with the patient.      Assessment and plan:   Mrs. Johnson is a 71-year-old with idiopathic pulmonary fibrosis who is here for followup.   1.  Idiopathic pulmonary fibrosis.  She has mild restrictive lung disease with normal spirometry; however, there has been a steady decline over the past year in lung function.  She continues to have high oxygen requirements.  She is on full-dose pirfenidone which is maximal medical therapy, and there is nothing else to offer her for treatment of IPF other than lung transplantation.  She is on the lung transplant list.  She will continue pirfenidone at the full dose as long as she tolerates it.  She will return in 3 months with full PFT.  We did discuss pulmonary rehab; however, she has not made a final decision whether or not she is interested.    2.  Lung transplant list.  She is on the list for lung transplant; however, it will be very difficult to have a match because of her PRA which is 100%.  When she returns in 3 months, we will check a VBG, PRA, 6-minute walk test, CBC with platelets, and complete metabolic panel.    3.  High-risk medication monitoring.  LFTs from 2 weeks ago were normal.  I will check  again in 3 months when she returns.     4.  Gastroesophageal reflux disease.  She will continue omeprazole daily.    5.  Exertional hypoxia.  She has an oxymizer at home that she uses.  We have asked her to talk with her oxygen company about different types of nasal cannulae to see if she can find one that is more convenient.    6.  Pulmonary Fibrosis Foundation Registry.  She is currently enrolled.    7.  Patent foramen ovale.  She has an appointment with Dr. Martin next week for further evaluation.

## 2017-01-05 NOTE — PROGRESS NOTES
ANTICOAGULATION FOLLOW-UP CLINIC VISIT    Patient Name:  Sophia Johnson  Date:  1/5/2017  Contact Type:  Telephone    SUBJECTIVE:        OBJECTIVE    INR   Date Value Ref Range Status   01/05/2017 1.68* 0.86 - 1.14 Final       ASSESSMENT / PLAN  INR assessment SUB    Recheck INR In: 2 WEEKS    INR Location Clinic      Anticoagulation Summary as of 1/5/2017     INR goal 2.0-3.0   Selected INR 1.68! (1/5/2017)   Maintenance plan 7.5 mg (5 mg x 1.5) on Fri; 5 mg (5 mg x 1) all other days   Full instructions 1/5: 7.5 mg; Otherwise 7.5 mg on Fri; 5 mg all other days   Weekly total 37.5 mg   Plan last modified Bianca Olsen RN (6/20/2016)   Next INR check 1/19/2017   Target end date     Indications   Long-term (current) use of anticoagulants [Z79.01] [Z79.01]  Atrial fibrillation (H) [I48.91]         Anticoagulation Episode Summary     INR check location     Preferred lab     Send INR reminders to [facility]    Comments       Anticoagulation Care Providers     Provider Role Specialty Phone number    Mario Martin MD Responsible Cardiology [phone]            See the Encounter Report to view Anticoagulation Flowsheet and Dosing Calendar (Go to Encounters tab in chart review, and find the Anticoagulation Therapy Visit)    Left message with results and dosing recommendations. Asked patient to call back to report any missed doses, falls, signs and symptoms of bleeding or clotting, or any changes to health or diet.     Claudia Nayak RN

## 2017-01-05 NOTE — Clinical Note
1/5/2017      RE: Sophia Johnson  [address] [facility] [address]       [facility] Lung Disease [facility]    Reason for Visit  Sophia Johnson is a 71 year old year old female who is being seen for Breathing Problem    HPI    Mrs. Johnson is a 71-year-old with idiopathic pulmonary fibrosis who is here for followup.  She is currently on the lung transplant list; however, the cory is that her PRA is 100%, so finding a match is very difficult.  She reports continued worsening of her dyspnea with exertion.  She does feel winded when she walks up a flight of stairs.  Her oxygen use at home varies from 6 liters up to 8 liters and she does use an Oxymizer at home.  She had some questions about the tubing of the cannula and if there is a tubing that is more comfortable.  She does endorse a persistent dry cough.  She takes pirfenidone for IPF at the full dose of 3 tablets 3 times a day.  She has noticed no side effects with this.  She did receive the flu vaccine.  She has an appointment with Dr. Martin next week for evaluation of her PFO and history of a-fib.             Current Outpatient Prescriptions   Medication     metoprolol (TOPROL-XL) 25 MG 24 hr tablet     pirfenidone (ESBRIET) 267 MG capsule     warfarin (COUMADIN) 5 MG tablet     omeprazole (PRILOSEC) 20 MG capsule     traMADol (ULTRAM) 50 MG tablet     SUNSCREEN SPF50 LOTN     lisinopril (PRINIVIL,ZESTRIL) 2.5 MG tablet     alendronate (FOSAMAX) 70 MG tablet     spironolactone (ALDACTONE) 25 MG tablet     Ascorbic Acid (VITAMIN C PO)     omeprazole (PRILOSEC) 20 MG capsule     order for DME     warfarin (COUMADIN) 7.5 MG tablet     order for DME     torsemide (DEMADEX) 10 MG tablet     ORDER FOR DME     cholecalciferol (VITAMIN D3) 1000 UNIT tablet     No current facility-administered medications for this visit.     Allergies   Allergen Reactions     Vicodin [Hydrocodone-Acetaminophen]      Headache       Past Medical  History   Diagnosis Date     Fall against object 2005     Fell 35 feet     LBP (low back pain)      On home oxygen therapy      uses when walking >3 minutes     Coughing      IPF (idiopathic pulmonary fibrosis) (H)      chest CT  showed pulm fibrosis but not diagnostic of IPF; VATS R lung bx 2013 +UIP; RAINIER simutuzumab study until ; pirfenidone started .     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 2010     kyphoplasty, vertebroplasty     Right wrist surgery       Colposcopy cervix, biopsy cervix, endocervical curettage, combined  age?     Reported Benign      Foot surgery       L foot tendon     Thoracoscopic wedge resection lung  2013     Procedure: THORACOSCOPIC WEDGE RESECTION LUNG;  Right Thoracscopic Wedge Resection Anesthesia General with block;  Surgeon: José Peralta MD;  Location: [facility] OR     Anesthesia cardioversion N/A 6/3/2015     Procedure: ANESTHESIA CARDIOVERSION;  Surgeon: GENERIC ANESTHESIA PROVIDER;  Location: [facility] OR      esoph/gas reflux test w nasal imped >1 hr N/A 3/17/2016     Procedure: ESOPHAGEAL IMPEDENCE FUNCTION TEST WITH 24 HOUR PH GREATER THAN 1 HOUR;  Surgeon: Gonzalo Reveles MD;  Location: [facility] GI     Transplant         Social History     Social History     Marital Status:      Spouse Name: N/A     Number of Children: 2     Years of Education: N/A     Occupational History      [employer] [place]     works in the laboratory     Social History Main Topics  "    Smoking status: Passive Smoke Exposure - Never Smoker -- 2 years     Types: Cigarettes     Smokeless tobacco: Never Used      Comment: Smoked in college, a cigarette here and there     Alcohol Use: No      Comment: 3 drinks a week     Drug Use: No     Sexual Activity:     Partners: Male     Other Topics Concern     Parent/Sibling W/ Cabg, Mi Or Angioplasty Before 65f 55m? No     Social History Narrative     and  at [employer]       Family History   Problem Relation Age of Onset     Breast Cancer Mother      CANCER Mother      Breast Cancer     CANCER Father      bone and liver     CEREBROVASCULAR DISEASE Maternal Grandfather              ROS Pulmonary  A complete ROS was otherwise negative except as noted in the HPI.    Vitals: BP 97/68 mmHg  Pulse 80  Resp 16  Ht 1.753 m (5' 9\")  Wt 89.812 kg (198 lb)  BMI 29.23 kg/m2  SpO2 100%    Exam:   GENERAL APPEARANCE: Well developed, well nourished, alert, and in no apparent distress.  RESP: good air flow throughout.  Bibasilar inspiratory crackles. No rhonchi. No wheezes.  CV: Normal S1, S2, regular rhythm, normal rate. No murmur.  No LE edema.   MS: extremities normal. No clubbing. No cyanosis.  SKIN: no rash on limited exam.  NEURO: Mentation intact, speech normal, normal gait and stance.  PSYCH: mentation appears normal. and affect normal/bright.    Results:  Recent Results (from the past 168 hour(s))   INR    Collection Time: 01/05/17  8:33 AM   Result Value Ref Range    INR 1.68 (H) 0.86 - 1.14   General PFT Lab (Please always keep checked)    Collection Time: 01/05/17  8:45 AM   Result Value Ref Range    FVC-Pred 3.33 L    FVC-Pre 2.44 L    FVC-%Pred-Pre 73 %    FEV1-Pre 2.19 L    FEV1-%Pred-Pre 85 %    FEV1FVC-Pred 78 %    FEV1FVC-Pre 90 %    FEFMax-Pred 6.39 L/sec    FEFMax-Pre 7.09 L/sec    FEFMax-%Pred-Pre 110 %    FEF2575-Pred 2.08 L/sec    FEF2575-Pre 3.24 L/sec    DSE2340-%Pred-Pre 155 %    ExpTime-Pre 5.06 sec    FIFMax-Pre " 4.97 L/sec    VC-Pred 3.62 L    VC-Pre 2.37 L    VC-%Pred-Pre 65 %    IC-Pred 2.96 L    IC-Pre 1.80 L    IC-%Pred-Pre 60 %    ERV-Pred 0.66 L    ERV-Pre 0.58 L    ERV-%Pred-Pre 87 %    FEV1FEV6-Pred 79 %    FEV1FEV6-Pre 90 %    FRCPleth-Pred 2.95 L    FRCPleth-Pre 2.02 L    FRCPleth-%Pred-Pre 68 %    RVPleth-Pred 2.24 L    RVPleth-Pre 1.44 L    RVPleth-%Pred-Pre 64 %    TLCPleth-Pred 5.65 L    TLCPleth-Pre 3.82 L    TLCPleth-%Pred-Pre 67 %    DLCOunc-Pred 22.34 ml/min/mmHg    DLCOunc-Pre 6.72 ml/min/mmHg    DLCOunc-%Pred-Pre 30 %    VA-Pre 3.04 L    VA-%Pred-Pre 52 %    FEV1SVC-Pred 71 %    FEV1SVC-Pre 92 %       I reviewed the pulmonary function test that was performed today.  This shows mild restrictive lung disease with a severely reduced diffusion.  There has been a steady decline in PFT and pulmonary function over the past year.     I reviewed results with the patient.      Assessment and plan:   Mrs. Johnson is a 71-year-old with idiopathic pulmonary fibrosis who is here for followup.   1.  Idiopathic pulmonary fibrosis.  She has mild restrictive lung disease with normal spirometry; however, there has been a steady decline over the past year in lung function.  She continues to have high oxygen requirements.  She is on full-dose pirfenidone which is maximal medical therapy, and there is nothing else to offer her for treatment of IPF other than lung transplantation.  She is on the lung transplant list.  She will continue pirfenidone at the full dose as long as she tolerates it.  She will return in 3 months with full PFT.  We did discuss pulmonary rehab; however, she has not made a final decision whether or not she is interested.    2.  Lung transplant list.  She is on the list for lung transplant; however, it will be very difficult to have a match because of her PRA which is 100%.  When she returns in 3 months, we will check a VBG, PRA, 6-minute walk test, CBC with platelets, and complete metabolic panel.    3.   High-risk medication monitoring.  LFTs from 2 weeks ago were normal.  I will check again in 3 months when she returns.     4.  Gastroesophageal reflux disease.  She will continue omeprazole daily.    5.  Exertional hypoxia.  She has an oxymizer at home that she uses.  We have asked her to talk with her oxygen company about different types of nasal cannulae to see if she can find one that is more convenient.    6.  Pulmonary Fibrosis Foundation Registry.  She is currently enrolled.    7.  Patent foramen ovale.  She has an appointment with Dr. Martin next week for further evaluation.     Lavinia Bowman MD

## 2017-01-09 NOTE — PATIENT INSTRUCTIONS
You were seen at the [facility] clinic today.  You saw Dr. Martin  Here are your Instructions:    1.  Right heart cath with Dr. Mario Mills RN  Nurse Care Coordinator  Office:  [phone]  Fax:  [phone]  After Hours:  [phone]  Appointments:  [phone]

## 2017-01-09 NOTE — Clinical Note
2017      RE: Sophia Johnson  [address] [id number]       Dear Colleague,    Thank you for the opportunity to participate in the care of your patient, Sophia Johnson, at the [facility]. Please see a copy of my visit note below.    2017             [place] [address]      Lavinia Bowman MD   [facility] Internal Medicine    [address] [id number]      RE: Sophia Johnson   MRN: [id number]   : 1945      Dear Dr. De Anda:      We had the pleasure of seeing Ms. Sophia Johnson in our Advanced Heart Failure and Transplant Clinic.  As you know, she is a 71-year-old female with a past medical history significant for:     1.  Idiopathic pulmonary fibrosis, who is currently listed for lung transplant.   2.  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   3. 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 Johnson returns today for 6 month followup visit.  She has been having a slow progression of her exertional shortness of breath.  Normally, she would be able to climb 1 flight of stairs; now she has to stop twice before she finishes her stairs.  Her oxygen requirements have also been increasing.  She is currently on 8 L of oxygen at rest and 10 L with activity.  She has not had any worsening lower extremity swelling or abdominal distention.  Her weight has been stable.  She has had occasional lightheadedness and dizziness, but no syncope.  She has no chest pain or pressure.  She has no PND or orthopnea.  I would currently characterize her as NYHA functional class IIIB.      CURRENT MEDICATIONS:   1.  Toprol XL 25 mg daily.   2.  Lisinopril 2.5 mg daily.   3.  Spironolactone 25 mg daily.   4.  Torsemide 10 mg daily.     5.  Pirfenidone  3 capsules 3 times daily.   6.  Alendronate 70 mg daily.   7.  Coumadin as directed.   8.  Prilosec 20 mg daily.   9.  Vitamin D 3000 units daily.   10.  Vitamin C 1 tablet daily.      REVIEW OF SYSTEMS:  A detailed 10 point review of systems was obtained as described in the History of Present Illness.  All other systems were reviewed and are negative.      PHYSICAL EXAMINATION:  She was in no apparent distress.  Her blood pressure was 116/72.  Her pulse rate was 58.  Her respiratory rate was 16.  She was saturating 98% on 8 L of nasal oxygen.  Her weight was stable at 201 pounds.  She had no pallor, cyanosis or jaundice.  Her neck exam revealed no JVD.  Her carotids were 2+ bilaterally.  She had no lower extremity edema.  Cardiac auscultation revealed normal S1 and a normal S2.  No murmur, rub or gallop.  Auscultation of her lungs revealed equal air entry on both sides with bilateral end-expiratory crepitations consistent with her interstitial lung disease.  Her abdomen was soft with normal bowel sounds; no tenderness, no rigidity, no guarding.  She had no focal neurological deficit.        She had an echocardiogram today, which I personally reviewed.  Her right ventricle was mildly dilated with mild to moderately reduced function.  She had paradoxical septal motion consistent with right ventricular pressure overload.  Her estimated PA systolic pressure was 35 mmHg plus the right atrial pressure.  Her left ventricular function was borderline low at 50%-55%.  Her right ventricular size and function have not significantly changed when compared to her prior echocardiogram from 04/2016.      She had cardiopulmonary exercise testing where she walked for 1 minute 30 seconds.  She achieved anaerobic threshold with an RER of 1.16.  She had significant functional limitations.  Her VO2 max was only 7.5 mL/kg/min.  Her blood pressure decreased with exercise.  Her heart rate appropriately increased.  She had significant  desaturation during exercise.  Her O2 pulse was low.  Her VE/VCO2 slope was significantly elevated.  She had reduced breathing. Her cardiopulmonary exercise testing was consistent with both cardiac as well as respiratory limitation.      Her NT-proBNP was mildly elevated at 447.  Her renal function was unremarkable.  Her CBC was within normal limits.      Her INR was therapeutic at 1.95.     She had a repeat RHC that showed a RA pressure of 2 mm Hg, RV      ASSESSMENT AND PLAN:      Ms. Sophia Johnson is a 71-year-old female with a past medical history significant for:     1.  Interstitial lung disease, who is currently listed for a lung transplant.   2.  History of tachycardia-induced cardiomyopathy with a most recent ejection fraction of 50%-55%.     3.  Atrial fibrillation, status post cardioversion.   4.  Pulmonary hypertension secondary to lung disease.   5.  PFO.      Ms. Sophia Johnson continues to have low normal LV function.  She is currently not volume overloaded.  We will continue her on the low dose of lisinopril, metoprolol and Aldactone.  We also recommend her to continue torsemide 10 mg daily.      Atrial fibrillation.  She remains in sinus rhythm.  Her CHADS-VASc score is high.  She is on Coumadin.  Her INR is therapeutic.        She has mild pulmonary hypertension secondary to her lung disease.  Her right ventricle is mildly dilated with a mildly reduced function.  The size and function of her right ventricle have not changed significantly when compared to her echo from April.  Her NT-proBNP, also, is only mildly elevated.  Clinically, she does not have any evidence of significant right ventricular dysfunction. Her RHC showed mild progression of her pulmonary hypertension but her right sided filling pressures and cardiac output are stable.  She does have a PFO. It is possible that she may be having right-to-left shunting due to her pulmonary hypertension and PFO. Closing her PFO in the presence of  pulmonary hypertension could be detrimental to her right ventricular function. As you know, the role of PAH-specific therapy in patients with mild PH due to lung disease is unclear. We are currently participating in a multi-center, randomized, controlled, clinical trial evaluating the safety and efficacy of inhaled treprositinil in PH due to ILD. I discussed this clinical trial with her and have provided reading materials. Will await her decision.       It was a pleasure meeting Ms. Sophia Gonsales in our Pulmonary Hypertension Clinic.  I have recommended her to return to clinic in 3 months.         Sincerely,   Mario Martin MD   Center for Pulmonary Hypertension  Heart Failure, Transplant, and Mechanical Circulatory Support Cardiology   Cardiovascular Division  [facility]   [phone]          D: 2017 16:42   T: 01/10/2017 09:08   MT: eric      Name:     SOPHIA GONSALES   MRN:      [id number]        Account:      [id number]   :      1945           Service Date: 2017      Document: [id number]

## 2017-01-09 NOTE — Clinical Note
2017      RE: Sophia Johnson  [address] [facility] [address]       2017             [place] [address]      Lavinia Bowman MD   [facility] Internal Medicine    [address] [place] [address]      RE: Sophia Johnson   MRN: [id number]   : 1945      Dear Dr. De Anda:      We had the pleasure of seeing Ms. Sophia Johnson in our Advanced Heart Failure and Transplant Clinic.  As you know, she is a 71-year-old female with a past medical history significant for:     1.  Idiopathic pulmonary fibrosis, who is currently listed for lung transplant.   2.  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   3. 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 Johnson returns today for 6 month followup visit.  She has been having a slow progression of her exertional shortness of breath.  Normally, she would be able to climb 1 flight of stairs; now she has to stop twice before she finishes her stairs.  Her oxygen requirements have also been increasing.  She is currently on 8 L of oxygen at rest and 10 L with activity.  She has not had any worsening lower extremity swelling or abdominal distention.  Her weight has been stable.  She has had occasional lightheadedness and dizziness, but no syncope.  She has no chest pain or pressure.  She has no PND or orthopnea.  I would currently characterize her as NYHA functional class IIIB.      CURRENT MEDICATIONS:   1.  Toprol XL 25 mg daily.   2.  Lisinopril 2.5 mg daily.   3.  Spironolactone 25 mg daily.   4.  Torsemide 10 mg daily.     5.  Pirfenidone 3 capsules 3 times daily.   6.  Alendronate 70 mg daily.   7.  Coumadin as directed.   8.  Prilosec 20 mg daily.   9.  Vitamin D 3000 units daily.   10.  Vitamin C 1 tablet daily.      REVIEW OF SYSTEMS:  A detailed 10 point review of  systems was obtained as described in the History of Present Illness.  All other systems were reviewed and are negative.      PHYSICAL EXAMINATION:  She was in no apparent distress.  Her blood pressure was 116/72.  Her pulse rate was 58.  Her respiratory rate was 16.  She was saturating 98% on 8 L of nasal oxygen.  Her weight was stable at 201 pounds.  She had no pallor, cyanosis or jaundice.  Her neck exam revealed no JVD.  Her carotids were 2+ bilaterally.  She had no lower extremity edema.  Cardiac auscultation revealed normal S1 and a normal S2.  No murmur, rub or gallop.  Auscultation of her lungs revealed equal air entry on both sides with bilateral end-expiratory crepitations consistent with her interstitial lung disease.  Her abdomen was soft with normal bowel sounds; no tenderness, no rigidity, no guarding.  She had no focal neurological deficit.        She had an echocardiogram today, which I personally reviewed.  Her right ventricle was mildly dilated with mild to moderately reduced function.  She had paradoxical septal motion consistent with right ventricular pressure overload.  Her estimated PA systolic pressure was 35 mmHg plus the right atrial pressure.  Her left ventricular function was borderline low at 50%-55%.  Her right ventricular size and function have not significantly changed when compared to her prior echocardiogram from 04/2016.      She had cardiopulmonary exercise testing where she walked for 1 minute 30 seconds.  She achieved anaerobic threshold with an RER of 1.16.  She had significant functional limitations.  Her VO2 max was only 7.5 mL/kg/min.  Her blood pressure decreased with exercise.  Her heart rate appropriately increased.  She had significant desaturation during exercise.  Her O2 pulse was low.  Her VE/VCO2 slope was significantly elevated.  She had reduced breathing. Her cardiopulmonary exercise testing was consistent with both cardiac as well as respiratory limitation.      Her  NT-proBNP was mildly elevated at 447.  Her renal function was unremarkable.  Her CBC was within normal limits.      Her INR was therapeutic at 1.95.     She had a repeat RHC that showed a RA pressure of 2 mm Hg, RV      ASSESSMENT AND PLAN:      Ms. Sophia Johnson is a 71-year-old female with a past medical history significant for:     1.  Interstitial lung disease, who is currently listed for a lung transplant.   2.  History of tachycardia-induced cardiomyopathy with a most recent ejection fraction of 50%-55%.     3.  Atrial fibrillation, status post cardioversion.   4.  Pulmonary hypertension secondary to lung disease.   5.  PFO.      Ms. Sophia Johnson continues to have low normal LV function.  She is currently not volume overloaded.  We will continue her on the low dose of lisinopril, metoprolol and Aldactone.  We also recommend her to continue torsemide 10 mg daily.      Atrial fibrillation.  She remains in sinus rhythm.  Her CHADS-VASc score is high.  She is on Coumadin.  Her INR is therapeutic.        She has mild pulmonary hypertension secondary to her lung disease.  Her right ventricle is mildly dilated with a mildly reduced function.  The size and function of her right ventricle have not changed significantly when compared to her echo from April.  Her NT-proBNP, also, is only mildly elevated.  Clinically, she does not have any evidence of significant right ventricular dysfunction. Her RHC showed mild progression of her pulmonary hypertension but her right sided filling pressures and cardiac output are stable.  She does have a PFO. It is possible that she may be having right-to-left shunting due to her pulmonary hypertension and PFO. Closing her PFO in the presence of pulmonary hypertension could be detrimental to her right ventricular function. As you know, the role of PAH-specific therapy in patients with mild PH due to lung disease is unclear. We are currently participating in a multi-center,  randomized, controlled, clinical trial evaluating the safety and efficacy of inhaled treprositinil in PH due to ILD. I discussed this clinical trial with her and have provided reading materials. Will await her decision.       It was a pleasure meeting Ms. Sophia Gonsales in our Pulmonary Hypertension Clinic.  I have recommended her to return to clinic in 3 months.         Sincerely,   Mario Martin MD   Center for Pulmonary Hypertension  Heart Failure, Transplant, and Mechanical Circulatory Support Cardiology   Cardiovascular Division  [facility]   [phone]               D: 2017 16:42   T: 01/10/2017 09:08   MT: eric      Name:     SOPHIA GONSALES   MRN:      [id number]        Account:      [id number]   :      1945           Service Date: 2017      Document: [id number]      Mario Martin MD

## 2017-01-09 NOTE — PROGRESS NOTES
ANTICOAGULATION FOLLOW-UP CLINIC VISIT    Patient Name:  Sophia Johnson  Date:  1/9/2017  Contact Type:  Telephone    SUBJECTIVE:        OBJECTIVE    INR   Date Value Ref Range Status   01/09/2017 1.95* 0.86 - 1.14 Final       ASSESSMENT / PLAN  INR assessment THER    Recheck INR In: 2 WEEKS    INR Location Clinic      Anticoagulation Summary as of 1/9/2017     INR goal 2.0-3.0   Selected INR 1.95! (1/9/2017)   Maintenance plan 7.5 mg (5 mg x 1.5) on Fri; 5 mg (5 mg x 1) all other days   Full instructions 7.5 mg on Fri; 5 mg all other days   Weekly total 37.5 mg   No change documented Claudia Nayak RN   Plan last modified Bianca Olsen RN (6/20/2016)   Next INR check 1/23/2017   Target end date     Indications   Long-term (current) use of anticoagulants [Z79.01] [Z79.01]  Atrial fibrillation (H) [I48.91]         Anticoagulation Episode Summary     INR check location     Preferred lab     Send INR reminders to [facility]    Comments       Anticoagulation Care Providers     Provider Role Specialty Phone number    Mario Martin MD Responsible Cardiology [phone]            See the Encounter Report to view Anticoagulation Flowsheet and Dosing Calendar (Go to Encounters tab in chart review, and find the Anticoagulation Therapy Visit)    Left message with results and dosing recommendations. Asked patient to call back to report any missed doses, falls, signs and symptoms of bleeding or clotting, or any changes to health or diet.      This was an unscheduled INR, as Pat was having other lab work.    Claudia Nayak, CATHY

## 2017-01-09 NOTE — NURSING NOTE
Chief Complaint   Patient presents with     Follow Up For     6 month f/u w/ echo, IPF, atrial fibrillation, biventricular dysfunction probably due to tachycardia-induced cardiomyopathy, cpx done 12/6/2016

## 2017-01-09 NOTE — MR AVS SNAPSHOT
After Visit Summary   1/9/2017    Sophia Johnson    MRN: [id number]           Patient Information     Date Of Birth          1945        Visit Information        Provider Department      1/9/2017 2:00 PM Mario Martin MD [facility]        Today's Diagnoses     Pulmonary hypertension (H)    -  1       Care Instructions    You were seen at the [facility] clinic today.  You saw Dr. Martin  Here are your Instructions:    1.  Right heart cath with Dr. Mario Mills, RN  Nurse Care Coordinator  Office:  [phone]  Fax:  [phone]  After Hours:  [phone]  Appointments:  [phone]                      Follow-ups after your visit        Your next 10 appointments already scheduled     Jan 11, 2017 11:00 AM   Heart Cath Right Heart Cath with [id number]   [facility]  Heart Cath Lab ([facility])    [address] [facility] [id number] [phone]            Apr 14, 2017  7:45 AM   LAB with  LAB    Health Lab ([facility])    [address] [place] [id number] [phone]           Patient must bring picture ID.  Patient should be prepared to give a urine specimen  Please do not eat 10-12 hours before your appointment if you are coming in fasting for labs on lipids, cholesterol, or glucose (sugar).  Pregnant women should follow their Care Team instructions. Water with medications is okay. Do not drink coffee or other fluids.   If you have concerns about taking  your medications, please ask at office or if scheduling via [employer], send a message by clicking on Secure Messaging, Message Your Care Team.            Apr 14, 2017  8:00 AM   SIX MINUTE WALK with  PFL B    Health Pulmonary Function Testing ([facility])    [address] [place] [address] [phone]             Apr 14, 2017  9:30 AM   SIX MINUTE WALK with UC PFL 6 MINUTE WALK 1   [facility] Pulmonary Function Testing ([facility])    [address] [facility]  [address] [facility] [id number] [phone]            Apr 14, 2017  9:30 AM   (Arrive by 9:15 AM)   Return Interstitial Lung with Lavinia Bowman MD   [facility] ([facility])    [address] [id number] [phone]              Future tests that were ordered for you today     Open Future Orders        Priority Expected Expires Ordered    Heart Cath Right heart cath Routine 1/11/2017 1/9/2018 1/9/2017            Who to contact     If you have questions or need follow up information about today's clinic visit or your schedule please contact [facility] directly at [phone].  Normal or non-critical lab and imaging results will be communicated to you by MyChart, letter or phone within 4 business days after the clinic has received the results. If you do not hear from us within 7 days, please contact the clinic through [employer] or phone. If you have a critical or abnormal lab result, we will notify you by phone as soon as possible.  Submit refill requests through [employer] or call your pharmacy and they will forward the refill request to us. Please allow 3 business days for your refill to be completed.          Additional Information About Your Visit        [employer] Information     [employer] gives you secure access to your electronic health record. If you see a primary care provider, you can also send messages to your care team and make appointments. If you have questions, please call your primary care clinic.  If you do not have a primary care provider, please call [phone] and they will assist you.        Care EveryWhere ID     This is your Care EveryWhere ID. This could be used by other organizations to access your [facility]  "records  [id number]        Your Vitals Were     Pulse Height BMI (Body Mass Index) Pulse Oximetry          58 1.765 m (5' 9.5\") 29.27 kg/m2 98%         Blood Pressure from Last 3 Encounters:   01/09/17 116/72   01/05/17 97/68   09/22/16 97/64    Weight from Last 3 Encounters:   01/09/17 91.173 kg (201 lb)   01/05/17 89.812 kg (198 lb)   09/22/16 89.812 kg (198 lb)               Primary Care Provider Office Phone # Fax #    Lesley Christianson [phone]       [address] [address]        Thank you!     Thank you for choosing [facility]  for your care. Our goal is always to provide you with excellent care. Hearing back from our patients is one way we can continue to improve our services. Please take a few minutes to complete the written survey that you may receive in the mail after your visit with us. Thank you!             Your Updated Medication List - Protect others around you: Learn how to safely use, store and throw away your medicines at www.disposemymeds.org.          This list is accurate as of: 1/9/17  3:04 PM.  Always use your most recent med list.                   Brand Name Dispense Instructions for use    alendronate 70 MG tablet    FOSAMAX    4 tablet    Take 1 tablet (70 mg) by mouth every 7 days       cholecalciferol 1000 UNIT tablet    vitamin D     Take 2,000 Units by mouth Once daily       lisinopril 2.5 MG tablet    PRINIVIL/Zestril    90 tablet    Take 1 tablet (2.5 mg) by mouth daily       metoprolol 25 MG 24 hr tablet    TOPROL-XL    90 tablet    TAKE ONE TABLET BY MOUTH ONE TIME DAILY       * omeprazole 20 MG CR capsule    priLOSEC    90 capsule    Take 1 capsule (20 mg) by mouth daily       * omeprazole 20 MG CR capsule    priLOSEC    90 capsule    Take 1 capsule (20 mg) by mouth daily       * order for DME     1 Device    Please provide patient with second liquid concentrator for filling smaller tanks.  Patient goes " through more oxygen since recent hospitalization and needs a second unit at home.       * order for DME     1 Device    Please provide patient with POC (portable oxygen concentrator).  Patient currently uses 2-3 LPM continuous flow oxygen.       * order for DME     1 Device    Patient currently gets oxygen form [employer].  Please provide new liquid system to meet increased need.  Patient requires 8 liters oxygen via nasal cannula with oximyzer to maintain oxygen saturation above 88% with activity.  This is for lifetime use.       pirfenidone 267 MG capsule    ESBRIET    270 capsule    Take 3 capsules (801 mg) by mouth 3 times daily (with meals)       spironolactone 25 MG tablet    ALDACTONE    90 tablet    TAKE ONE TABLET BY MOUTH EVERY DAY       SUNSCREEN SPF50 Lotn     250 mL    Apply as needed.       torsemide 10 MG tablet    DEMADEX    90 tablet    Take 1 tablet (10 mg) by mouth daily as needed       traMADol 50 MG tablet    ULTRAM    40 tablet    Take 1-2 tabs three times daily for pain as needed       VITAMIN C PO          * warfarin 7.5 MG tablet    COUMADIN    30 tablet    Take 1 tablet (7.5 mg) by mouth daily       * warfarin 5 MG tablet    COUMADIN    90 tablet    Take 5 mg daily or as instructed by coumadin clinic nurse       * Notice:  This list has 7 medication(s) that are the same as other medications prescribed for you. Read the directions carefully, and ask your doctor or other care provider to review them with you.

## 2017-01-10 NOTE — PROGRESS NOTES
2017             Chuck De Anda MD   [address] [address]      Lavinia Bowman MD   [facility]    [address] [place] [address]      RE: Sophia Johnson   MRN: [id number]   : 1945      Dear Dr. De Anda:      We had the pleasure of seeing Ms. Sophia Johnson in our Advanced Heart Failure and Transplant Clinic.  As you know, she is a 71-year-old female with a past medical history significant for:     1.  Idiopathic pulmonary fibrosis, who is currently listed for lung transplant.   2.  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   3. 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 Johnson returns today for 6 month followup visit.  She has been having a slow progression of her exertional shortness of breath.  Normally, she would be able to climb 1 flight of stairs; now she has to stop twice before she finishes her stairs.  Her oxygen requirements have also been increasing.  She is currently on 8 L of oxygen at rest and 10 L with activity.  She has not had any worsening lower extremity swelling or abdominal distention.  Her weight has been stable.  She has had occasional lightheadedness and dizziness, but no syncope.  She has no chest pain or pressure.  She has no PND or orthopnea.  I would currently characterize her as NYHA functional class IIIB.      CURRENT MEDICATIONS:   1.  Toprol XL 25 mg daily.   2.  Lisinopril 2.5 mg daily.   3.  Spironolactone 25 mg daily.   4.  Torsemide 10 mg daily.     5.  Pirfenidone 3 capsules 3 times daily.   6.  Alendronate 70 mg daily.   7.  Coumadin as directed.   8.  Prilosec 20 mg daily.   9.  Vitamin D 3000 units daily.   10.  Vitamin C 1 tablet daily.      REVIEW OF SYSTEMS:  A detailed 10 point review of systems was obtained as described in the History of Present Illness.  All other systems  were reviewed and are negative.      PHYSICAL EXAMINATION:  She was in no apparent distress.  Her blood pressure was 116/72.  Her pulse rate was 58.  Her respiratory rate was 16.  She was saturating 98% on 8 L of nasal oxygen.  Her weight was stable at 201 pounds.  She had no pallor, cyanosis or jaundice.  Her neck exam revealed no JVD.  Her carotids were 2+ bilaterally.  She had no lower extremity edema.  Cardiac auscultation revealed normal S1 and a normal S2.  No murmur, rub or gallop.  Auscultation of her lungs revealed equal air entry on both sides with bilateral end-expiratory crepitations consistent with her interstitial lung disease.  Her abdomen was soft with normal bowel sounds; no tenderness, no rigidity, no guarding.  She had no focal neurological deficit.        She had an echocardiogram today, which I personally reviewed.  Her right ventricle was mildly dilated with mild to moderately reduced function.  She had paradoxical septal motion consistent with right ventricular pressure overload.  Her estimated PA systolic pressure was 35 mmHg plus the right atrial pressure.  Her left ventricular function was borderline low at 50%-55%.  Her right ventricular size and function have not significantly changed when compared to her prior echocardiogram from 04/2016.      She had cardiopulmonary exercise testing where she walked for 1 minute 30 seconds.  She achieved anaerobic threshold with an RER of 1.16.  She had significant functional limitations.  Her VO2 max was only 7.5 mL/kg/min.  Her blood pressure decreased with exercise.  Her heart rate appropriately increased.  She had significant desaturation during exercise.  Her O2 pulse was low.  Her VE/VCO2 slope was significantly elevated.  She had reduced breathing. Her cardiopulmonary exercise testing was consistent with both cardiac as well as respiratory limitation.      Her NT-proBNP was mildly elevated at 447.  Her renal function was unremarkable.  Her CBC  was within normal limits.      Her INR was therapeutic at 1.95.     She had a repeat RHC that showed a RA pressure of 2 mm Hg, RV 45/4, PA 45/20 (30), PCWP 5, PA saturation 65%, CO 4.6 l/min, and PVR 5.4 Wood units.      ASSESSMENT AND PLAN:      Ms. Sophia Johnson is a 71-year-old female with a past medical history significant for:     1.  Interstitial lung disease, who is currently listed for a lung transplant.   2.  History of tachycardia-induced cardiomyopathy with a most recent ejection fraction of 50%-55%.     3.  Atrial fibrillation, status post cardioversion.   4.  Pulmonary hypertension secondary to lung disease.   5.  PFO.      Ms. Sophia Johnson continues to have low normal LV function.  She is currently not volume overloaded.  We will continue her on the low dose of lisinopril, metoprolol and Aldactone.  We also recommend her to continue torsemide 10 mg daily.      Atrial fibrillation.  She remains in sinus rhythm.  Her CHADS-VASc score is high.  She is on Coumadin.  Her INR is therapeutic.        She has mild pulmonary hypertension secondary to her lung disease.  Her right ventricle is mildly dilated with a mildly reduced function.  The size and function of her right ventricle have not changed significantly when compared to her echo from April.  Her NT-proBNP, also, is only mildly elevated.  Clinically, she does not have any evidence of significant right ventricular dysfunction. Her RHC showed mild progression of her pulmonary hypertension but her right sided filling pressures and cardiac output are stable.  She does have a PFO. It is possible that she may be having right-to-left shunting due to her pulmonary hypertension and PFO. Closing her PFO in the presence of pulmonary hypertension could be detrimental to her right ventricular function. As you know, the role of PAH-specific therapy in patients with mild PH due to lung disease is unclear. We are currently participating in a multi-center,  randomized, controlled, clinical trial evaluating the safety and efficacy of inhaled treprositinil in PH due to ILD. I discussed this clinical trial with her and have provided reading materials. Will await her decision.       It was a pleasure meeting Ms. Sophia Gonsales in our Pulmonary Hypertension Clinic.  I have recommended her to return to clinic in 3 months.         Sincerely,   Mario Martin MD   [facility]  Heart Failure, Transplant, and Mechanical Circulatory Support Cardiology   Cardiovascular Division  [facility]   [phone]               D: 2017 16:42   T: 01/10/2017 09:08   MT: eric      Name:     SOPHIA GONSALES   MRN:      [id number]        Account:      [id number]   :      1945           Service Date: 2017      Document: [id number]

## 2017-01-11 NOTE — IP AVS SNAPSHOT
[address] [address]                                       After Visit Summary   1/11/2017    Sophia Johnson    MRN: [id number]           After Visit Summary Signature Page     I have received my discharge instructions, and my questions have been answered. I have discussed any challenges I see with this plan with the nurse or doctor.    ..........................................................................................................................................  Patient/Patient Representative Signature      ..........................................................................................................................................  Patient Representative Print Name and Relationship to Patient    ..................................................               ................................................  Date                                            Time    ..........................................................................................................................................  Reviewed by Signature/Title    ...................................................              ..............................................  Date                                                            Time

## 2017-01-11 NOTE — DISCHARGE INSTRUCTIONS
[facility]                        Interventional Cardiology  Discharge Instructions   Post Right Heart Cath      AFTER YOU GO HOME:    DO drink plenty of fluids    DO resume your regular diet and medications unless otherwise instructed by your Primary Physician    Do Not scrub the procedure site vigorously    No lotion or powder to the puncture site for 3 days    CALL YOUR PRIMARY PHYSICIAN IF: You may resume all normal activity.  Monitor neck site for bleeding, swelling, or voice changes. If you notice bleeding or swelling immediately apply pressure to the site and call number below to speak with Cardiology Fellow.  If you experience any changes in your breathing you should call your doctor immediately or come to the closest Emergency Department.  Do not drive yourself.    ADDITIONAL INSTRUCTIONS: Medications: You are to resume all home medications including anticoagulation therapy unless otherwise advised by your primary cardiologist or nurse coordinator.    Follow Up: Per your primary cardiology team    If you have any questions or concerns regarding your procedure site please call [phone] at anytime and ask for Cardiology Fellow on call.  They are available 24 hours a day.  You may also contact the Cardiology Clinic after hours number at [phone].                                                       Telephone Numbers [phone] Monday-Friday 8:00 am to 4:30 pm    [phone] After 4:30 pm Monday-Friday, Weekends & Holidays  Ask for Interventional Cardiologist on call. Someone is on call 24 hours/day   [facility] toll free number [phone] Monday-Friday 8:00 am to 4:30 pm   [facility] Emergency Dept [phone]

## 2017-01-11 NOTE — PROGRESS NOTES
D: Pt arrived in cath lab for Right Heart Catheterization  I: Right heart catheterization completed per MD.  7fr sheath removed from  RIJ site, manual pressure applied until hemostasis achieved.  A: RIJ site clean, dry and intact. Soft, no hematoma.  P: Pt to transfer to  for discharge.  Pt educated on watching neck site for bleeding, hematoma, pressure on airway and voice changes.

## 2017-01-11 NOTE — PROGRESS NOTES
Returned to unit from [facility]. Dr. Amos here to see her and family about the results. R Neck site is dry and intact. Discharge instructions given to her.

## 2017-01-11 NOTE — PROGRESS NOTES
Patient tolerated recovery stage well. VSS, R neck site clean/dry/intact, no hematoma, and denies pain. Patient tolerated fluids. Teaching was done and discharge instructions were given. Patient discharged from the hospital via wheel chair with Oxygen at 8 Liters to home with .

## 2017-01-11 NOTE — PROGRESS NOTES
ANTICOAGULATION FOLLOW-UP CLINIC VISIT    Patient Name:  Sophia Johnson  Date:  1/11/2017  Contact Type:  Telephone    SUBJECTIVE:     Patient Findings     Positives Intentional hold of therapy (held coumadin 1/9/17 and 1/10/17)    Comments Sophia called--she had a right heart cath today, 1/11/17.            OBJECTIVE    INR POINT OF CARE   Date Value Ref Range Status   01/11/2017 1.3* 0.86 - 1.14 Final       ASSESSMENT / PLAN  INR assessment SUB    Recheck INR In: 5 DAYS    INR Location Clinic      Anticoagulation Summary as of 1/11/2017     INR goal 2.0-3.0   Selected INR 1.3! (1/11/2017)   Maintenance plan 7.5 mg (5 mg x 1.5) on Fri; 5 mg (5 mg x 1) all other days   Full instructions 1/11: 7.5 mg; Otherwise 7.5 mg on Fri; 5 mg all other days   Weekly total 37.5 mg   Plan last modified Bianca Olsen RN (6/20/2016)   Next INR check 1/16/2017   Target end date     Indications   Long-term (current) use of anticoagulants [Z79.01] [Z79.01]  Atrial fibrillation (H) [I48.91]         Anticoagulation Episode Summary     INR check location     Preferred lab     Send INR reminders to [facility]    Comments       Anticoagulation Care Providers     Provider Role Specialty Phone number    Mario Martin MD Responsible Cardiology [phone]            See the Encounter Report to view Anticoagulation Flowsheet and Dosing Calendar (Go to Encounters tab in chart review, and find the Anticoagulation Therapy Visit)    Spoke to Sophia.  She held warfarin 1/9/17 and 1/10/17.  Had right heart cath 1/11/17.  She was given warfarin dosing by Dr. Martin--to take 7.5 mg of warfarin 1/11 and 1/13 and 5 mg other days.  Recheck INR 1/16/17.    Claudia Nash, RN

## 2017-01-11 NOTE — IP AVS SNAPSHOT
MRN:[id number]                      After Visit Summary   1/11/2017    Sophia Johnson    MRN: [id number]           Visit Information        Department      1/11/2017  9:46 AM [address] [facility]          Review of your medicines      UNREVIEWED medicines. Ask your doctor about these medicines        Dose / Directions    alendronate 70 MG tablet   Commonly known as:  FOSAMAX   Used for:  Compression fracture of T12 vertebra, with routine healing, subsequent encounter, Osteopenia        Dose:  70 mg   Take 1 tablet (70 mg) by mouth every 7 days   Quantity:  4 tablet   Refills:  11       cholecalciferol 1000 UNIT tablet   Commonly known as:  vitamin D        Dose:  2000 Units   Take 2,000 Units by mouth Once daily   Refills:  0       lisinopril 2.5 MG tablet   Commonly known as:  PRINIVIL/Zestril   Used for:  Chronic systolic heart failure (H)        Dose:  2.5 mg   Take 1 tablet (2.5 mg) by mouth daily   Quantity:  90 tablet   Refills:  3       metoprolol 25 MG 24 hr tablet   Commonly known as:  TOPROL-XL   Used for:  Paroxysmal atrial fibrillation (H), Chronic systolic heart failure (H)        TAKE ONE TABLET BY MOUTH ONE TIME DAILY   Quantity:  90 tablet   Refills:  3       * omeprazole 20 MG CR capsule   Commonly known as:  priLOSEC   Used for:  IPF (idiopathic pulmonary fibrosis) (H), Hiatal hernia        Dose:  20 mg   Take 1 capsule (20 mg) by mouth daily   Quantity:  90 capsule   Refills:  6       * omeprazole 20 MG CR capsule   Commonly known as:  priLOSEC   Used for:  Gastroesophageal reflux disease, esophagitis presence not specified        Dose:  20 mg   Take 1 capsule (20 mg) by mouth daily   Quantity:  90 capsule   Refills:  3       pirfenidone 267 MG capsule   Commonly known as:  ESBRIET   Used for:  IPF (idiopathic pulmonary fibrosis) (H)        Dose:  801 mg   Take 3 capsules (801 mg) by mouth 3 times daily (with meals)   Quantity:  270 capsule   Refills:  11        spironolactone 25 MG tablet   Commonly known as:  ALDACTONE   Used for:  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   Quantity:  250 mL   Refills:  11       torsemide 10 MG tablet   Commonly known as:  DEMADEX   Used for:  Non-ischemic cardiomyopathy (H)        Dose:  10 mg   Take 1 tablet (10 mg) by mouth daily as needed   Quantity:  90 tablet   Refills:  3       traMADol 50 MG tablet   Commonly known as:  ULTRAM   Used for:  Back pain, unspecified back pain laterality, unspecified chronicity, unspecified location        Take 1-2 tabs three times daily for pain as needed   Quantity:  40 tablet   Refills:  1       VITAMIN C PO        Refills:  0       * warfarin 7.5 MG tablet   Commonly known as:  COUMADIN   Used for:  Atrial fibrillation with RVR (H)        Dose:  7.5 mg   Take 1 tablet (7.5 mg) by mouth daily   Quantity:  30 tablet   Refills:  1       * warfarin 5 MG tablet   Commonly known as:  COUMADIN   Used for:  Atrial fibrillation with RVR (H)        Take 5 mg daily or as instructed by coumadin clinic nurse   Quantity:  90 tablet   Refills:  3       * Notice:  This list has 4 medication(s) that are the same as other medications prescribed for you. Read the directions carefully, and ask your doctor or other care provider to review them with you.      CONTINUE these medicines which have NOT CHANGED        Dose / Directions    * order for DME   Used for:  IPF (idiopathic pulmonary fibrosis) (H)        Please provide patient with second liquid concentrator for filling smaller tanks.  Patient goes through more oxygen since recent hospitalization and needs a second unit at home.   Quantity:  1 Device   Refills:  0       * order for DME   Used for:  IPF (idiopathic pulmonary fibrosis) (H), Hypoxia        Please provide patient with POC  (portable oxygen concentrator).  Patient currently uses 2-3 LPM continuous flow oxygen.   Quantity:  1 Device   Refills:  0       * order for DME   Used for:  ILD (interstitial lung disease) (H), Hypoxia        Patient currently gets oxygen form [employer].  Please provide new liquid system to meet increased need.  Patient requires 8 liters oxygen via nasal cannula with oximyzer to maintain oxygen saturation above 88% with activity.  This is for lifetime use.   Quantity:  1 Device   Refills:  0       * Notice:  This list has 3 medication(s) that are the same as other medications prescribed for you. Read the directions carefully, and ask your doctor or other care provider to review them with you.             Protect others around you: Learn how to safely use, store and throw away your medicines at www.disposemymeds.org.         Follow-ups after your visit        Your next 10 appointments already scheduled     Jan 11, 2017 11:00 AM   Heart Cath Right Heart Cath with [id number]   [facility] [place]  Heart Cath Lab ([facility])    [address] [id number] [phone]            Apr 14, 2017  7:45 AM   LAB with  LAB   [facility] ([facility])    [address] [id number] [phone]           Patient must bring picture ID.  Patient should be prepared to give a urine specimen  Please do not eat 10-12 hours before your appointment if you are coming in fasting for labs on lipids, cholesterol, or glucose (sugar).  Pregnant women should follow their Care Team instructions. Water with medications is okay. Do not drink coffee or other fluids.   If you have concerns about taking  your medications, please ask at office or if scheduling via [email or web address], send a message by clicking on Secure Messaging, Message Your Care Team.            Apr 14, 2017  8:00 AM   SIX MINUTE WALK with  PF B   [facility]  [facility] ([facility])    [address] [address]   [phone]            Apr 14, 2017  9:30 AM   SIX MINUTE WALK with UC PFL 6 MINUTE WALK 1   [facility] Pulmonary Function Testing ([facility])    [address] [address]   [phone]            Apr 14, 2017  9:30 AM   (Arrive by 9:15 AM)   Return Interstitial Lung with Lavinia Bowman MD   [facility] ([facility])    [address] [place] [address]   [phone]               Care Instructions        Further instructions from your care team       [facility]                        Interventional Cardiology  Discharge Instructions   Post Right Heart Cath      AFTER YOU GO HOME:    DO drink plenty of fluids    DO resume your regular diet and medications unless otherwise instructed by your Primary Physician    Do Not scrub the procedure site vigorously    No lotion or powder to the puncture site for 3 days    CALL YOUR PRIMARY PHYSICIAN IF: You may resume all normal activity.  Monitor neck site for bleeding, swelling, or voice changes. If you notice bleeding or swelling immediately apply pressure to the site and call number below to speak with Cardiology Fellow.  If you experience any changes in your breathing you should call your doctor immediately or come to the closest Emergency Department.  Do not drive yourself.    ADDITIONAL INSTRUCTIONS: Medications: You are to resume all home medications including anticoagulation therapy unless otherwise advised by your primary cardiologist or nurse coordinator.    Follow Up: Per your primary cardiology team    If you have any questions or concerns regarding your procedure site please call [phone] at anytime and ask for Cardiology Fellow on call.  They are available 24 hours a day.  You may also contact  "the Cardiology Clinic after hours number at [phone].                                                       Telephone Numbers [phone] Monday-Friday 8:00 am to 4:30 pm    [phone] After 4:30 pm Monday-Friday, Weekends & Holidays  Ask for Interventional Cardiologist on call. Someone is on call 24 hours/day   [facility] toll free number [phone] Monday-Friday 8:00 am to 4:30 pm   [facility] Emergency Dept [phone]                    Additional Information About Your Visit        [employer]SeeMe Information     [employer] gives you secure access to your electronic health record. If you see a primary care provider, you can also send messages to your care team and make appointments. If you have questions, please call your primary care clinic.  If you do not have a primary care provider, please call [phone] and they will assist you.        Care EveryWhere ID     This is your Care EveryWhere ID. This could be used by other organizations to access your [facility] medical records  [id number]        Your Vitals Were     Blood Pressure Pulse Temperature    113/73 mmHg 84 97.8  F (36.6  C) (Oral)    Respirations Height Weight    16 1.765 m (5' 9.5\") 91.173 kg (201 lb)    BMI (Body Mass Index) Pulse Oximetry       29.27 kg/m2 99%        Primary Care Provider Office Phone # Fax #    Lesley Christianson [phone]      Thank you!     Thank you for choosing [facility] for your care. Our goal is always to provide you with excellent care. Hearing back from our patients is one way we can continue to improve our services. Please take a few minutes to complete the written survey that you may receive in the mail after you visit with us. Thank you!             Medication List: This is a list of all your medications and when to take them. Check marks below indicate your daily home schedule. Keep this list as a reference.      Medications           Morning Afternoon Evening Bedtime As Needed    alendronate " 70 MG tablet   Commonly known as:  FOSAMAX   Take 1 tablet (70 mg) by mouth every 7 days                                cholecalciferol 1000 UNIT tablet   Commonly known as:  vitamin D   Take 2,000 Units by mouth Once daily                                lisinopril 2.5 MG tablet   Commonly known as:  PRINIVIL/Zestril   Take 1 tablet (2.5 mg) by mouth daily                                metoprolol 25 MG 24 hr tablet   Commonly known as:  TOPROL-XL   TAKE ONE TABLET BY MOUTH ONE TIME DAILY                                * omeprazole 20 MG CR capsule   Commonly known as:  priLOSEC   Take 1 capsule (20 mg) by mouth daily                                * omeprazole 20 MG CR capsule   Commonly known as:  priLOSEC   Take 1 capsule (20 mg) by mouth daily                                * order for DME   Please provide patient with second liquid concentrator for filling smaller tanks.  Patient goes through more oxygen since recent hospitalization and needs a second unit at home.                                * order for DME   Please provide patient with POC (portable oxygen concentrator).  Patient currently uses 2-3 LPM continuous flow oxygen.                                * order for DME   Patient currently gets oxygen form [facility].  Please provide new liquid system to meet increased need.  Patient requires 8 liters oxygen via nasal cannula with oximyzer to maintain oxygen saturation above 88% with activity.  This is for lifetime use.                                pirfenidone 267 MG capsule   Commonly known as:  ESBRIET   Take 3 capsules (801 mg) by mouth 3 times daily (with meals)                                spironolactone 25 MG tablet   Commonly known as:  ALDACTONE   TAKE ONE TABLET BY MOUTH EVERY DAY                                SUNSCREEN SPF50 Lotn   Apply as needed.                                torsemide 10 MG tablet   Commonly known as:  DEMADEX   Take 1 tablet (10 mg) by mouth daily as needed                                 traMADol 50 MG tablet   Commonly known as:  ULTRAM   Take 1-2 tabs three times daily for pain as needed                                VITAMIN C PO                                * warfarin 7.5 MG tablet   Commonly known as:  COUMADIN   Take 1 tablet (7.5 mg) by mouth daily                                * warfarin 5 MG tablet   Commonly known as:  COUMADIN   Take 5 mg daily or as instructed by coumadin clinic nurse                                * Notice:  This list has 7 medication(s) that are the same as other medications prescribed for you. Read the directions carefully, and ask your doctor or other care provider to review them with you.

## 2017-01-24 NOTE — PROGRESS NOTES
This patient has been coming here weekly for INR's. Her INR orders have . Please place a new INR order in open orders for the future. Thanks shola

## 2017-01-24 NOTE — PROGRESS NOTES
ANTICOAGULATION FOLLOW-UP CLINIC VISIT    Patient Name:  Sophia Johnson  Date:  1/24/2017  Contact Type:  Telephone    SUBJECTIVE:     Patient Findings     Positives No Problem Findings           OBJECTIVE    INR   Date Value Ref Range Status   01/24/2017 2.00* 0.86 - 1.14 Final     Comment:     This test is intended for monitoring Coumadin therapy.  Results are not   accurate   in patients with prolonged INR due to factor deficiency.         ASSESSMENT / PLAN  INR assessment THER    Recheck INR In: 4 WEEKS    INR Location Clinic      Anticoagulation Summary as of 1/24/2017     INR goal 2.0-3.0   Selected INR 2.00 (1/24/2017)   Maintenance plan 7.5 mg (5 mg x 1.5) on Fri; 5 mg (5 mg x 1) all other days   Full instructions 7.5 mg on Fri; 5 mg all other days   Weekly total 37.5 mg   No change documented Claudia Nash RN   Plan last modified Bianca Olsen RN (6/20/2016)   Next INR check 2/21/2017   Priority INR   Target end date     Indications   Long-term (current) use of anticoagulants [Z79.01] [Z79.01]  Atrial fibrillation (H) [I48.91]         Anticoagulation Episode Summary     INR check location     Preferred lab     Send INR reminders to [facility] CL[facility]    Comments       Anticoagulation Care Providers     Provider Role Specialty Phone number    Mario Martin MD Responsible Cardiology [phone]            See the Encounter Report to view Anticoagulation Flowsheet and Dosing Calendar (Go to Encounters tab in chart review, and find the Anticoagulation Therapy Visit)    Spoke with Jessica.  She reports no changes in health, diet, medications.    Claudia Nash RN

## 2017-01-26 NOTE — PROGRESS NOTES
"Pt called asking to speak to Dr. Martin's nurse regarding increased fatigue since having RHC on 1/11/17. She is unable to describe how she feels other than \"like a rag\". Will forward to Dr. Mario Mills RN    "

## 2017-02-10 NOTE — TELEPHONE ENCOUNTER
Jessica called on Wednesday because she now has a concentrator that goes to 10 liters and wanted to make sure it is equivalent to the liquid that she had been using.  Assured her it should be equivalent, but occasionally equipment may not work correctly.  If concerns she should discuss with her oxygen company.    Jessica stated that she now is consistently using 8 liters at rest.  If she uses 6 liters (documented early January) she started coughing and drops her sats.  8 liters keeps her comfortable.   Discussed potentially verifying oxygen requirement in clinic on behalf of increasing LAS score.  Jessica would prefer not coming to clinic if possible due to potential exposure to respiratory viruses.    Reviewed with Dr. Bowman who felt that Jessica did not need to come to clinic, could ask her oxygen company to assess.    **Pat called today with cold symptoms.  She is coughing a lot, had a sore throat, also ear fullness that is better.  Initially she had some green tinge in sputum.  Now she just has increased quantity of white sputum.  Her chest does not feel tight or rattlely, however when on 10 liters her sats drop as low as 62 when she gets up to bathroom.  She wonders if she could start the Zpac that she has on standby.  Reviewed with Dr. Bowman who agreed to Zpac.  Pat should come to ER if symptoms worsen.  Patient's Zpac has .  New script sent to her pharmacy.  Confirmed plan with Pat to take the Zpac and to go to ER if her symptoms worsen over the week-end.  Pat agrees to plan.     Spoke to Felecia and requested spot check of baseline oxygen use at rest for Pat.  Requested that this be done early next week after treating current infection.  They confirmed they will do so.

## 2017-02-10 NOTE — PROGRESS NOTES
Sophia taylor.  She is starting on azithromycin today x 5 days for an upper respiratory infection.  She will decrease warfarin dose today to 5 mg and recheck INR 2/13/17.  Claudia Nash RN

## 2017-02-13 NOTE — MR AVS SNAPSHOT
Sophia Johnson   2/13/2017   Anticoagulation Therapy Visit    Description:  71 year old female   Provider:  Claudia Nayak, RN   Department:  [facility]           INR as of 2/13/2017     Today's INR 2.10      Anticoagulation Summary as of 2/13/2017     INR goal 2.0-3.0   Today's INR 2.10   Full instructions 7.5 mg on Fri; 5 mg all other days   Next INR check 2/17/2017    Indications   Long-term (current) use of anticoagulants [Z79.01] [Z79.01]  Atrial fibrillation (H) [I48.91]         February 2017 Details    Sun Mon Tue Wed Thu Fri Sat        1               2               3               4                 5               6               7               8               9               10               11                 12               13      5 mg   See details      14      5 mg         15      5 mg         16      5 mg         17            18                 19               20               21               22               23               24               25                 26               27               28                    Date Details   02/13 This INR check       Date of next INR:  2/17/2017         How to take your warfarin dose     To take:  5 mg Take 1 of the 5 mg tablets.    To take:  7.5 mg Take 1.5 of the 5 mg tablets.

## 2017-02-13 NOTE — PROGRESS NOTES
ANTICOAGULATION FOLLOW-UP CLINIC VISIT    Patient Name:  Sophia Johnson  Date:  2/13/2017  Contact Type:  Telephone    SUBJECTIVE:     Patient Findings     Positives Antibiotic use or infection           OBJECTIVE    INR   Date Value Ref Range Status   02/13/2017 2.10 (H) 0.86 - 1.14 Final     Comment:     This test is intended for monitoring Coumadin therapy.  Results are not   accurate   in patients with prolonged INR due to factor deficiency.         ASSESSMENT / PLAN  INR assessment THER    Recheck INR In: 4 DAYS    INR Location Clinic      Anticoagulation Summary as of 2/13/2017     INR goal 2.0-3.0   Today's INR 2.10   Maintenance plan 7.5 mg (5 mg x 1.5) on Fri; 5 mg (5 mg x 1) all other days   Full instructions 7.5 mg on Fri; 5 mg all other days   Weekly total 37.5 mg   Plan last modified Bianca Olsen RN (6/20/2016)   Next INR check 2/17/2017   Priority INR   Target end date     Indications   Long-term (current) use of anticoagulants [Z79.01] [Z79.01]  Atrial fibrillation (H) [I48.91]         Anticoagulation Episode Summary     INR check location     Preferred lab     Send INR reminders to [facility] CL[facility]    Comments       Anticoagulation Care Providers     Provider Role Specialty Phone number    Mario Martin MD Responsible Cardiology [phone]            See the Encounter Report to view Anticoagulation Flowsheet and Dosing Calendar (Go to Encounters tab in chart review, and find the Anticoagulation Therapy Visit)    Left message with results and dosing recommendations. Asked patient to call back to report any missed doses, falls, signs and symptoms of bleeding or clotting, or any changes to health or diet.     Sophia started a Z pack on Friday.     Claudia Nayak RN

## 2017-02-22 NOTE — PROGRESS NOTES
ANTICOAGULATION FOLLOW-UP CLINIC VISIT    Patient Name:  Sophia Johnson  Date:  2/22/2017  Contact Type:  Telephone    SUBJECTIVE:     Patient Findings     Positives No Problem Findings           OBJECTIVE    INR   Date Value Ref Range Status   02/22/2017 2.60 (H) 0.86 - 1.14 Final     Comment:     This test is intended for monitoring Coumadin therapy.  Results are not   accurate   in patients with prolonged INR due to factor deficiency.         ASSESSMENT / PLAN  INR assessment THER    Recheck INR In: 3 WEEKS    INR Location Clinic      Anticoagulation Summary as of 2/22/2017     INR goal 2.0-3.0   Today's INR 2.60   Maintenance plan 7.5 mg (5 mg x 1.5) on Fri; 5 mg (5 mg x 1) all other days   Full instructions 7.5 mg on Fri; 5 mg all other days   Weekly total 37.5 mg   Plan last modified Bianca Olsen, RN (6/20/2016)   Next INR check 3/15/2017   Priority INR   Target end date     Indications   Long-term (current) use of anticoagulants [Z79.01] [Z79.01]  Atrial fibrillation (H) [I48.91]         Anticoagulation Episode Summary     INR check location     Preferred lab     Send INR reminders to [facility] CL[facility]    Comments       Anticoagulation Care Providers     Provider Role Specialty Phone number    Mario Martin MD Responsible Cardiology [phone]            See the Encounter Report to view Anticoagulation Flowsheet and Dosing Calendar (Go to Encounters tab in chart review, and find the Anticoagulation Therapy Visit)    Message left.    Bianca Olsen RN       ADDENDUM:  Pt phones on 2/24 to report she will be starting augmentin for URI.  She will get an INR on 2/28 in light of this.  Graciela Preciado RN

## 2017-02-22 NOTE — MR AVS SNAPSHOT
Sophia Johnson   2/22/2017   Anticoagulation Therapy Visit    Description:  71 year old female   Provider:  Bianca Olsen, RN   Department:  [facility]           INR as of 2/22/2017     Today's INR 2.60      Anticoagulation Summary as of 2/22/2017     INR goal 2.0-3.0   Today's INR 2.60   Full instructions 7.5 mg on Fri; 5 mg all other days   Next INR check 3/15/2017    Indications   Long-term (current) use of anticoagulants [Z79.01] [Z79.01]  Atrial fibrillation (H) [I48.91]         February 2017 Details    Sun Mon Tue Wed Thu Fri Sat        1               2               3               4                 5               6               7               8               9               10               11                 12               13               14               15               16               17               18                 19               20               21               22      5 mg   See details      23      5 mg         24      7.5 mg         25      5 mg           26      5 mg         27      5 mg         28      5 mg              Date Details   02/22 This INR check               How to take your warfarin dose     To take:  5 mg Take 1 of the 5 mg tablets.    To take:  7.5 mg Take 1.5 of the 5 mg tablets.           March 2017 Details    Sun Mon Tue Wed Thu Fri Sat        1      5 mg         2      5 mg         3      7.5 mg         4      5 mg           5      5 mg         6      5 mg         7      5 mg         8      5 mg         9      5 mg         10      7.5 mg         11      5 mg           12      5 mg         13      5 mg         14      5 mg         15            16               17               18                 19               20               21               22               23               24               25                 26               27               28               29               30               31                 Date Details   No additional  details    Date of next INR:  3/15/2017         How to take your warfarin dose     To take:  5 mg Take 1 of the 5 mg tablets.    To take:  7.5 mg Take 1.5 of the 5 mg tablets.

## 2017-02-22 NOTE — PROGRESS NOTES
Patient here for inr check, she needs documentation of o2 sat while here. She is on a transplant list and is decompensating. She has brought a log in with her along with her own o2 sat monitor. Her monitor is consistant with the one here in clinic. She came in with a Wc when she got up to take about a 40 ft walk her sats dropto 70 her respiratory rate about 30 her pulse 118. She had her own portable o2 on at 10 liters per NC. She returns to 90's in about 5 minutes post activity.  She does record her readings.  Litzy Julian,Clinic Rn  [facility] [place]

## 2017-02-22 NOTE — Clinical Note
Patient here for o2 sat check with her inr draw today she is at 96 on 10 liters o2. Her o2 sat machine is accurate as well. After a 40 ft walk sats drop to 70 pulse up to 118 for about 5 minutes breathing labored, dry cough, all while on 10 liters o2per nc. She did come back up to 96 after 5 minutes of rest. Litzy Julian,Clinic Rn [place]

## 2017-02-22 NOTE — MR AVS SNAPSHOT
After Visit Summary   2/22/2017    Sophia Johnson    MRN: [id number]           Patient Information     Date Of Birth          1945        Visit Information        Provider Department      2/22/2017 11:00 AM NE RN [facility]        Today's Diagnoses     Oxygen desaturation    -  1      Care Instructions    Continue to monitor, use wheel chair for activity, and continuous oxygen.        Follow-ups after your visit        Your next 10 appointments already scheduled     Apr 14, 2017  7:45 AM CDT   LAB with UC LAB   [facility] ([facility])    [address] [place] [address] [phone]           Patient must bring picture ID.  Patient should be prepared to give a urine specimen  Please do not eat 10-12 hours before your appointment if you are coming in fasting for labs on lipids, cholesterol, or glucose (sugar).  Pregnant women should follow their Care Team instructions. Water with medications is okay. Do not drink coffee or other fluids.   If you have concerns about taking  your medications, please ask at office or if scheduling via [employer], send a message by clicking on Secure Messaging, Message Your Care Team.            Apr 14, 2017  8:00 AM CDT   SIX MINUTE WALK with UC PFL B   [facility] Function Testing ([facility])    [address] [id number] [phone]            Apr 14, 2017  9:30 AM CDT   SIX MINUTE WALK with UC PFL 6 MINUTE WALK 1   [facility] Function [facility] ([facility])    [address] [place] [id number] [phone]            Apr 14, 2017  9:30 AM CDT   (Arrive by 9:15 AM)   Return Interstitial Lung with Lavinia Bowman MD   [facility] ([facility])    [address] [place] [address]    [phone]            Jul 03, 2017  2:00 PM CDT   (Arrive by 1:45 PM)   RETURN HEART FAILURE with Mario Martin MD   [facility] ([facility])    [address] [place] [address]  3rd [facility] [address] [phone]              Who to contact     If you have questions or need follow up information about today's clinic visit or your schedule please contact [facility] directly at [phone].  Normal or non-critical lab and imaging results will be communicated to you by [employer], letter or phone within 4 business days after the clinic has received the results. If you do not hear from us within 7 days, please contact the clinic through [employer] or phone. If you have a critical or abnormal lab result, we will notify you by phone as soon as possible.  Submit refill requests through [employer] or call your pharmacy and they will forward the refill request to us. Please allow 3 business days for your refill to be completed.          Additional Information About Your Visit        [employer] Information     [employer] gives you secure access to your electronic health record. If you see a primary care provider, you can also send messages to your care team and make appointments. If you have questions, please call your primary care clinic.  If you do not have a primary care provider, please call [phone] and they will assist you.        Care EveryWhere ID     This is your Care EveryWhere ID. This could be used by other organizations to access your [place] medical records  [id number]        Your Vitals Were     Pulse Pulse Oximetry                118 70%           Blood Pressure from Last 3 Encounters:   01/11/17 120/79   01/09/17 116/72   01/05/17 97/68    Weight from Last 3 Encounters:   01/11/17 201 lb (91.2 kg)   01/09/17 201 lb (91.2 kg)   01/05/17 198 lb (89.8 kg)              Today, you had the following     No orders found for display       Primary Care  Provider Office Phone # Fax #    Lesley Christianson [phone]       [facility] [address] [address]        Thank you!     Thank you for choosing [facility]  for your care. Our goal is always to provide you with excellent care. Hearing back from our patients is one way we can continue to improve our services. Please take a few minutes to complete the written survey that you may receive in the mail after your visit with us. Thank you!             Your Updated Medication List - Protect others around you: Learn how to safely use, store and throw away your medicines at www.disposemymeds.org.          This list is accurate as of: 2/22/17 11:21 AM.  Always use your most recent med list.                   Brand Name Dispense Instructions for use    alendronate 70 MG tablet    FOSAMAX    4 tablet    Take 1 tablet (70 mg) by mouth every 7 days       azithromycin 250 MG tablet    ZITHROMAX    6 tablet    Take 2 tablets (500 mg) the first day, then 1 tablet daily.       cholecalciferol 1000 UNIT tablet    vitamin D     Take 2,000 Units by mouth Once daily       lisinopril 2.5 MG tablet    PRINIVIL/Zestril    90 tablet    Take 1 tablet (2.5 mg) by mouth daily       metoprolol 25 MG 24 hr tablet    TOPROL-XL    90 tablet    TAKE ONE TABLET BY MOUTH ONE TIME DAILY       * omeprazole 20 MG CR capsule    priLOSEC    90 capsule    Take 1 capsule (20 mg) by mouth daily       * omeprazole 20 MG CR capsule    priLOSEC    90 capsule    Take 1 capsule (20 mg) by mouth daily       * order for DME     1 Device    Please provide patient with second liquid concentrator for filling smaller tanks.  Patient goes through more oxygen since recent hospitalization and needs a second unit at home.       * order for DME     1 Device    Please provide patient with POC (portable oxygen concentrator).  Patient currently uses 2-3 LPM continuous flow oxygen.       * order for DME     1  Device    Patient currently gets oxygen form [employer].  Please provide new liquid system to meet increased need.  Patient requires 8 liters oxygen via nasal cannula with oximyzer to maintain oxygen saturation above 88% with activity.  This is for lifetime use.       pirfenidone 267 MG capsule    ESBRIET    270 capsule    Take 3 capsules (801 mg) by mouth 3 times daily (with meals)       spironolactone 25 MG tablet    ALDACTONE    90 tablet    TAKE ONE TABLET BY MOUTH EVERY DAY       SUNSCREEN SPF50 Lotn     250 mL    Apply as needed.       torsemide 10 MG tablet    DEMADEX    90 tablet    Take 1 tablet (10 mg) by mouth daily as needed       traMADol 50 MG tablet    ULTRAM    40 tablet    Take 1-2 tabs three times daily for pain as needed       VITAMIN C PO          * warfarin 7.5 MG tablet    COUMADIN    30 tablet    Take 1 tablet (7.5 mg) by mouth daily       * warfarin 5 MG tablet    COUMADIN    90 tablet    Take 5 mg daily or as instructed by coumadin clinic nurse       * Notice:  This list has 7 medication(s) that are the same as other medications prescribed for you. Read the directions carefully, and ask your doctor or other care provider to review them with you.

## 2017-02-24 NOTE — PROGRESS NOTES
Believes she is in atrial fibrillation. Expeirienced a lot of hard coughing yesterday. Oximeter showed a pulse rate from  bpm.  Denies being more short of breath, can't tell if she more fatigued due to underlying pulmonary disease. Will schedule an EKG for [place].  Patient is appropriately anticoagulated and has been well controlled with  EKG scheduled for Monday February 27th at 10:30 am at [facility].

## 2017-02-24 NOTE — TELEPHONE ENCOUNTER
Jessica called with increased sputum which is lightish green/tan and has streaks of bright red blood.  Her cough is bad enough to keep her awake at night.   She recently completed a Zpac for upper respiratory/sinus symptoms which resolved, then she started having increased cough and sputum production.  In general she is requiring more oxygen--is using 10 liters at rest--and finds it more difficult to get around.    Reviewed with Dr. Bowman who prescribed Augmentin for 1 week to treat bronchitis and a guaifenesin/codeine cough syrup for her cough.  Confirmed plan with Sophia.  Having some blood in sputum could be related to being on coumadin.  Instructed Jessica to be seen more urgently if her symptoms worsen.

## 2017-02-25 NOTE — IP AVS SNAPSHOT
MRN:[id number]                      After Visit Summary   2/25/2017    Sophia Johnson    MRN: [id number]           Thank you!     Thank you for choosing [facility] for your care. Our goal is always to provide you with excellent care. Hearing back from our patients is one way we can continue to improve our services. Please take a few minutes to complete the written survey that you may receive in the mail after you visit with us. Thank you!        Patient Information     Date Of Birth          1945        About your hospital stay     You were admitted on:  February 26, 2017 You last received care in the:  [address] [facility]    You were discharged on:  March 1, 2017        Reason for your hospital stay       Admitted with worsening hypoxia and atrial fibrillation. You were seen by both cardiology and pulmonology during your hospital stay. You underwent cardioversion for a-fib with conversion to normal sinus rhythm. 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 No other acute findings were identified to explain your worsening hypoxia. We suspect this may be due to your chronic lung disease.                  Who to Call     For medical emergencies, please call 911.  For non-urgent questions about your medical care, please call your primary care provider or clinic, [phone]  For questions related to your surgery, please call your surgery clinic        Attending Provider     Provider Specialty    Ruth Rob MD Emergency Medicine    Cresencio Donahue MD Internal Medicine    Michael Anguiano MD Internal Medicine       Primary Care Provider Office Phone # Fax #    Lesley Rafaela Christianson [phone]       [address] [address]        After Care Instructions     Activity       Your activity upon discharge: activity as tolerated            Diet       Follow this diet upon discharge: Orders Placed This  Encounter      2 Gram Sodium Diet            Oxygen Adult       Renew Home Oxygen Order  Renew previous prescription.  Expected treatment length is indefinite (99 months).  ___________________________  [facility]  Phone  [phone]  Fax  [phone]  ___________________________   Attending Provider: CELSA Jones  Physician signature: See electronic signature associated with these discharge orders  Date of Order: March 1, 2017                  Follow-up Appointments     Adult [facility] Follow-up and recommended labs and tests       Follow up with Dr. Marcelo in [facility] clinic in 1-2 weeks.    Follow up with Dr. Bowman already scheduled 4/14/17, but recommend calling clinic to schedule follow up sooner.    Appointments on [place] and/or [facility] (with [facility] or [facility] provider or service). Call [phone] if you haven't heard regarding these appointments within 7 days of discharge.                  Your next 10 appointments already scheduled     Mar 08, 2017  9:30 AM CST   (Arrive by 9:15 AM)   Return Visit with DAWNA Almendarez CNP   [facility] ([facility])    [address] [place] [address]  4th [facility] [address] [phone]            Mar 14, 2017  6:30 PM CDT   (Arrive by 6:15 PM)   CORE RETURN with DAWNA Cruz CNP   [facility] ([facility])    [address] [place] [address]  3rd [facility] [address] [phone]            Apr 14, 2017  7:45 AM CDT   LAB with  LAB   [facility] ([facility])    [address] [place] [address] [phone]           Patient must bring picture ID.  Patient should be prepared to give a urine specimen  Please do not eat 10-12 hours before your appointment if you are coming in fasting for labs on lipids, cholesterol, or glucose (sugar).  Pregnant women should follow their Care Team instructions. Water  with medications is okay. Do not drink coffee or other fluids.   If you have concerns about taking  your medications, please ask at office or if scheduling via [employer], send a message by clicking on Secure Messaging, Message Your Care Team.            Apr 14, 2017  8:00 AM CDT   SIX MINUTE WALK with UC PFL B   [facility] ([facility])    [address] [place] [address]   [phone]            Apr 14, 2017  9:30 AM CDT   SIX MINUTE WALK with UC PFL 6 MINUTE WALK 1   [facility] ([facility])    [address] [place] [address]   [phone]            Apr 14, 2017  9:30 AM CDT   (Arrive by 9:15 AM)   Return Interstitial Lung with Lavinia Bowman MD   [facility] ([facility])    [address] [place] [address]   [phone]            Jul 03, 2017  2:00 PM CDT   (Arrive by 1:45 PM)   RETURN HEART FAILURE with Mario Martin MD   [facility] ([facility])    [address] [place] [address]   [phone]              Pending Results     Date and Time Order Name Status Description    2/27/2017 1232 Cardioversion In process     2/26/2017 0357 Blood culture Preliminary     2/25/2017 1858 POC US ECHO LIMITED In process     2/25/2017 1645 Blood culture Preliminary     2/25/2017 1645 Blood culture Preliminary             Statement of Approval     Ordered          03/01/17 1700  I have reviewed and agree with all the recommendations and orders detailed in this document.  EFFECTIVE NOW     Approved and electronically signed by:  Juanito Hall PA-C             Admission Information     Date & Time Provider Department Dept. Phone    2/25/2017 Michael Anguiano MD [address] [facility] [phone]      Your Vitals  "Were     Blood Pressure Pulse Temperature Respirations Height Weight    94/58 (BP Location: Right arm) 70 97.7  F (36.5  C) (Oral) 16 1.753 m (5' 9\") 95 kg (209 lb 8 oz)    Pulse Oximetry BMI (Body Mass Index)                97% 30.94 kg/m2          [employer] Information     [employer] gives you secure access to your electronic health record. If you see a primary care provider, you can also send messages to your care team and make appointments. If you have questions, please call your primary care clinic.  If you do not have a primary care provider, please call [phone] and they will assist you.        Care EveryWhere ID     This is your Care EveryWhere ID. This could be used by other organizations to access your [place] medical records  [id number]           Review of your medicines      START taking        Dose / Directions    Chlorpheniramine-Codeine 2-10 MG/5ML Liqd   Used for:  Cough        Dose:  5-10 mL   Take 5-10 mLs by mouth every 12 hours as needed   Quantity:  473 mL   Refills:  0         CONTINUE these medicines which may have CHANGED, or have new prescriptions. If we are uncertain of the size of tablets/capsules you have at home, strength may be listed as something that might have changed.        Dose / Directions    * warfarin 7.5 MG tablet   Commonly known as:  COUMADIN   This may have changed:    - when to take this  - additional instructions   Used for:  Atrial fibrillation with RVR (H)        Dose:  7.5 mg   Take 1 tablet (7.5 mg) by mouth daily   Quantity:  30 tablet   Refills:  1       * warfarin 5 MG tablet   Commonly known as:  COUMADIN   This may have changed:  additional instructions   Used for:  Atrial fibrillation with RVR (H)        Take 5 mg daily or as instructed by coumadin clinic nurse   Quantity:  90 tablet   Refills:  3       * Notice:  This list has 2 medication(s) that are the same as other medications prescribed for you. Read the directions carefully, and ask your doctor or other " care provider to review them with you.      CONTINUE these medicines which have NOT CHANGED        Dose / Directions    alendronate 70 MG tablet   Commonly known as:  FOSAMAX   Used for:  Compression fracture of T12 vertebra, with routine healing, subsequent encounter, Osteopenia        Dose:  70 mg   Take 1 tablet (70 mg) by mouth every 7 days   Quantity:  4 tablet   Refills:  11       calcium-vitamin D 600-400 MG-UNIT per tablet   Commonly known as:  CALTRATE        Dose:  1 tablet   Take 1 tablet by mouth daily   Refills:  0       guaiFENesin-codeine 100-10 MG/5ML Soln solution   Commonly known as:  CHERATUSSIN AC   Used for:  Cough        Dose:  1-2 tsp.   Take 5-10 mLs by mouth every 4 hours as needed for cough   Quantity:  236 mL   Refills:  0       lisinopril 2.5 MG tablet   Commonly known as:  PRINIVIL/Zestril   Used for:  Chronic systolic heart failure (H)        Dose:  2.5 mg   Take 1 tablet (2.5 mg) by mouth daily   Quantity:  90 tablet   Refills:  3       metoprolol 25 MG 24 hr tablet   Commonly known as:  TOPROL-XL   Used for:  Paroxysmal atrial fibrillation (H), Chronic systolic heart failure (H)        TAKE ONE TABLET BY MOUTH ONE TIME DAILY   Quantity:  90 tablet   Refills:  3       omeprazole 20 MG CR capsule   Commonly known as:  priLOSEC   Used for:  IPF (idiopathic pulmonary fibrosis) (H), Hiatal hernia        Dose:  20 mg   Take 1 capsule (20 mg) by mouth daily   Quantity:  90 capsule   Refills:  6       * order for DME   Used for:  IPF (idiopathic pulmonary fibrosis) (H)        Please provide patient with second liquid concentrator for filling smaller tanks.  Patient goes through more oxygen since recent hospitalization and needs a second unit at home.   Quantity:  1 Device   Refills:  0       * order for DME   Used for:  IPF (idiopathic pulmonary fibrosis) (H), Hypoxia        Please provide patient with POC (portable oxygen concentrator).  Patient currently uses 2-3 LPM continuous flow oxygen.    Quantity:  1 Device   Refills:  0       * order for DME   Used for:  ILD (interstitial lung disease) (H), Hypoxia        Patient currently gets oxygen form [facility].  Please provide new liquid system to meet increased need.  Patient requires 8 liters oxygen via nasal cannula with oximyzer to maintain oxygen saturation above 88% with activity.  This is for lifetime use.   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   Quantity:  250 mL   Refills:  11       torsemide 10 MG tablet   Commonly known as:  DEMADEX   Used for:  Non-ischemic cardiomyopathy (H)        Dose:  10 mg   Take 1 tablet (10 mg) by mouth daily as needed   Quantity:  90 tablet   Refills:  3       VITAMIN C PO        Refills:  0       * Notice:  This list has 3 medication(s) that are the same as other medications prescribed for you. Read the directions carefully, and ask your doctor or other care provider to review them with you.      STOP taking     amoxicillin-clavulanate 875-125 MG per tablet   Commonly known as:  AUGMENTIN                Where to get your medicines      Some of these will need a paper prescription and others can be bought over the counter. Ask your nurse if you have questions.     Bring a paper prescription for each of these medications     Chlorpheniramine-Codeine 2-10 MG/5ML Liqd                Protect others around you: Learn how to safely use, store and throw away your medicines at www.disposemymeds.org.              Medication List: This is a list of all your medications and when to take them. Check marks below indicate your daily home schedule. Keep this list as a reference.      Medications           Morning Afternoon Evening Bedtime As Needed    alendronate 70 MG tablet   Commonly known as:  FOSAMAX   Take 1 tablet (70 mg) by mouth every 7 days                                calcium-vitamin D 600-400 MG-UNIT per tablet   Commonly known as:  CALTRATE   Take 1 tablet by mouth daily                                Chlorpheniramine-Codeine 2-10 MG/5ML Liqd   Take 5-10 mLs by mouth every 12 hours as needed                                guaiFENesin-codeine 100-10 MG/5ML Soln solution   Commonly known as:  CHERATUSSIN AC   Take 5-10 mLs by mouth every 4 hours as needed for cough   Last time this was given:  10 mLs on 3/1/2017  5:55 PM                                lisinopril 2.5 MG tablet   Commonly known as:  PRINIVIL/Zestril   Take 1 tablet (2.5 mg) by mouth daily   Last time this was given:  2.5 mg on 3/1/2017  8:28 AM                                metoprolol 25 MG 24 hr tablet   Commonly known as:  TOPROL-XL   TAKE ONE TABLET BY MOUTH ONE TIME DAILY   Last time this was given:  25 mg on 3/1/2017  8:28 AM                                omeprazole 20 MG CR capsule   Commonly known as:  priLOSEC   Take 1 capsule (20 mg) by mouth daily   Last time this was given:  20 mg on 3/1/2017  8:29 AM                                * order for DME   Please provide patient with second liquid concentrator for filling smaller tanks.  Patient goes through more oxygen since recent hospitalization and needs a second unit at home.                                * order for DME   Please provide patient with POC (portable oxygen concentrator).  Patient currently uses 2-3 LPM continuous flow oxygen.                                * order for DME   Patient currently gets oxygen form [facility].  Please provide new liquid system to meet increased  need.  Patient requires 8 liters oxygen via nasal cannula with oximyzer to maintain oxygen saturation above 88% with activity.  This is for lifetime use.                                pirfenidone 267 MG capsule   Commonly known as:  ESBRIET   Take 3 capsules (801 mg) by mouth 3 times daily (with meals)   Last time this was given:  801 mg on 3/1/2017  5:53 PM                                spironolactone 25 MG tablet   Commonly known as:  ALDACTONE   TAKE ONE TABLET BY MOUTH EVERY DAY   Last time this was given:  25 mg on 3/1/2017 12:23 PM                                SUNSCREEN SPF50 Lotn   Apply as needed.                                torsemide 10 MG tablet   Commonly known as:  DEMADEX   Take 1 tablet (10 mg) by mouth daily as needed                                VITAMIN C PO                                * warfarin 7.5 MG tablet   Commonly known as:  COUMADIN   Take 1 tablet (7.5 mg) by mouth daily   Last time this was given:  5 mg on 3/1/2017  5:55 PM                                * warfarin 5 MG tablet   Commonly known as:  COUMADIN   Take 5 mg daily or as instructed by coumadin clinic nurse   Last time this was given:  5 mg on 3/1/2017  5:55 PM                                * Notice:  This list has 5 medication(s) that are the same as other medications prescribed for you. Read the directions carefully, and ask your doctor or other care provider to review them with you.

## 2017-02-25 NOTE — ED NOTES
C/o SOB and irregular heart rate with dizziness today, rate  per pt  H/o afib and IPF  States she is currently taking antibiotics for a cold and productive cough  Denies having pneumonia  C/o difficulty lying flat, needs to sleep sitting up

## 2017-02-25 NOTE — ED PROVIDER NOTES
History     Chief Complaint   Patient presents with     Shortness of Breath     HPI  Sophia Johnson is a 71 year old female with a history of idiopathic pulmonary fibrosis (on 10 L/min chronic supplemental oxygen) on the transplant list, atrial fibrillation (on Coumadin), chronic systolic heart failure who presents to the Emergency Department for evaluation of shortness of breath. The patient was treated with a Z-Lon two weeks ago for suspected pneumonia but did not feel like her symptoms were improving so she was started on Augmentin yesterday.     The patient reports that for the last three days she has been feeling generally unwell and has had gradually worsening shortness of breath, especially upon minimal exertion which she feels is above her baseline. She feels like her chronic supplemental oxygen at 10 L/min continuously is no longer enough to manage her shortness of breath. She has associated chest tightness which she has had in the past with shortness of breath but never this severe. Last night she also developed some orthopnea and had to sleep propped up. She admits to some chills and nausea without fever, vomiting, abdominal pain. She has an at home heart rate monitor and felt that her heart rate was irregular today. No other complaints at this time.     I have reviewed the Medications, Allergies, Past Medical and Surgical History, and Social History in the [employer] system.  Past Medical History   Diagnosis Date     Atrial fibrillation (H) 4/7/2015     Atypical squamous cell changes of undetermined significance (ASCUS) on cervical cytology with positive high risk human papilloma virus (HPV) age 30 & following     Chronic systolic heart failure (H)      Coughing      Fall against object 2/2005     Fell 35 feet     IPF (idiopathic pulmonary fibrosis) (H)      chest CT 2010 showed pulm fibrosis but not diagnostic of IPF; VATS R lung bx 5/2013 +UIP; FAROOQ simutuzumab study until 4-2015; pirfenidone started  .     LBP (low back pain)      On home oxygen therapy      uses when walking >3 minutes     Pneumonia      interstitial lung disease      Vitamin D deficiency        Past Surgical History   Procedure Laterality Date     Arthroscopy knee rt/lt       Right     Surgical history of -        left foot surgery, tendon had , cadaver bone     Laminect/discectomy, lumbar  Oct. 2010     kyphoplasty, vertebroplasty     Right wrist surgery       Colposcopy cervix, biopsy cervix, endocervical curettage, combined  age?     Reported Benign      Foot surgery       L foot tendon     Thoracoscopic wedge resection lung  2013     Procedure: THORACOSCOPIC WEDGE RESECTION LUNG;  Right Thoracscopic Wedge Resection Anesthesia General with block;  Surgeon: José ePralta MD;  Location: [facility] OR     Anesthesia cardioversion N/A 6/3/2015     Procedure: ANESTHESIA CARDIOVERSION;  Surgeon: GENERIC ANESTHESIA PROVIDER;  Location: [facility] OR      esoph/gas reflux test w nasal imped >1 hr N/A 3/17/2016     Procedure: ESOPHAGEAL IMPEDENCE FUNCTION TEST WITH 24 HOUR PH GREATER THAN 1 HOUR;  Surgeon: Gonzalo Reveles MD;  Location: [facility] GI     Transplant         Family History   Problem Relation Age of Onset     Breast Cancer Mother      CANCER Mother      Breast Cancer     CANCER Father      bone and liver     CEREBROVASCULAR DISEASE Maternal Grandfather        Social History   Substance Use Topics     Smoking status: Passive Smoke Exposure - Never Smoker     Years: 2.00     Types: Cigarettes     Smokeless tobacco: Never Used      Comment: Smoked in college, a cigarette here and there     Alcohol use No      Comment: 3 drinks a week       Current Facility-Administered Medications   Medication     piperacillin-tazobactam (ZOSYN) 4.5 g vial to attach to  mL bag     levofloxacin (LEVAQUIN) infusion 750 mg     vancomycin (VANCOCIN) 2,000 mg in NaCl 0.9 % 500 mL intermittent infusion     Current Outpatient Prescriptions  "  Medication     guaiFENesin-codeine (CHERATUSSIN AC) 100-10 MG/5ML SOLN solution     amoxicillin-clavulanate (AUGMENTIN) 875-125 MG per tablet     azithromycin (ZITHROMAX) 250 MG tablet     metoprolol (TOPROL-XL) 25 MG 24 hr tablet     pirfenidone (ESBRIET) 267 MG capsule     warfarin (COUMADIN) 5 MG tablet     omeprazole (PRILOSEC) 20 MG capsule     traMADol (ULTRAM) 50 MG tablet     SUNSCREEN SPF50 LOTN     lisinopril (PRINIVIL,ZESTRIL) 2.5 MG tablet     alendronate (FOSAMAX) 70 MG tablet     spironolactone (ALDACTONE) 25 MG tablet     Ascorbic Acid (VITAMIN C PO)     omeprazole (PRILOSEC) 20 MG capsule     order for DME     warfarin (COUMADIN) 7.5 MG tablet     order for DME     torsemide (DEMADEX) 10 MG tablet     ORDER FOR DME     cholecalciferol (VITAMIN D3) 1000 UNIT tablet          Allergies   Allergen Reactions     Vicodin [Hydrocodone-Acetaminophen]      Headache        Review of Systems   Constitutional: Positive for chills. Negative for fever.   HENT: Negative for congestion.    Eyes: Negative for redness.   Respiratory: Positive for chest tightness and shortness of breath.    Cardiovascular: Negative for chest pain.   Gastrointestinal: Positive for nausea. Negative for abdominal pain, blood in stool, constipation and vomiting.   Genitourinary: Negative for difficulty urinating and dysuria.   Musculoskeletal: Negative for arthralgias and neck stiffness.   Skin: Negative for color change.   Neurological: Negative for headaches.   Psychiatric/Behavioral: Negative for confusion.       Physical Exam   BP: 117/76  Pulse: 70  Temp: 97.2  F (36.2  C)  Resp: (!) 32  Height: 175.3 cm (5' 9\")  SpO2: 94 %  Physical Exam   Constitutional: She is oriented to person, place, and time. She appears well-developed and well-nourished.   HENT:   Head: Normocephalic and atraumatic.   Neck: Normal range of motion.   Cardiovascular: Normal rate, regular rhythm and normal heart sounds.    Irregular irregular   Pulmonary/Chest: " Effort normal. No respiratory distress. She has rales (diffuse; ).   Unable to lay flat; normal conversation   Abdominal: Soft. She exhibits no distension. There is no tenderness. There is no rebound.   Musculoskeletal: She exhibits no edema or tenderness.   Neurological: She is alert and oriented to person, place, and time.   Skin: Skin is warm and dry.   Psychiatric: She has a normal mood and affect. Her behavior is normal. Thought content normal.       ED Course     ED Course     Procedures         EKG Interpretation:      Interpreted by Ruth Rob  Time reviewed: 1620  Symptoms at time of EKG: SOB   Rhythm: atrial fibrillation - rapid  Rate: Tachycardia  Axis: Normal  Ectopy: none  Conduction: normal  ST Segments/ T Waves: T wave inversion III, aVF, V3, V4, V5 and V6  Q Waves: III and aVf  Comparison to prior: new Afib    Clinical Impression: atrial fibrillation (chronic)-new recently                Critical Care time:  none        Results for orders placed or performed during the hospital encounter of 02/25/17   XR Chest 2 Views    Narrative    1. New hazy opacity in the left base may represent atelectasis and/or  consolidation.  2. Chronic bilateral interstitial fibrotic changes.   CBC with platelets differential   Result Value Ref Range    WBC 8.6 4.0 - 11.0 10e9/L    RBC Count 4.22 3.8 - 5.2 10e12/L    Hemoglobin 13.0 11.7 - 15.7 g/dL    Hematocrit 39.7 35.0 - 47.0 %    MCV 94 78 - 100 fl    MCH 30.8 26.5 - 33.0 pg    MCHC 32.7 31.5 - 36.5 g/dL    RDW 13.5 10.0 - 15.0 %    Platelet Count 243 150 - 450 10e9/L    Diff Method Automated Method     % Neutrophils 69.2 %    % Lymphocytes 15.3 %    % Monocytes 12.3 %    % Eosinophils 2.8 %    % Basophils 0.3 %    % Immature Granulocytes 0.1 %    Nucleated RBCs 0 0 /100    Absolute Neutrophil 5.9 1.6 - 8.3 10e9/L    Absolute Lymphocytes 1.3 0.8 - 5.3 10e9/L    Absolute Monocytes 1.1 0.0 - 1.3 10e9/L    Absolute Eosinophils 0.2 0.0 - 0.7 10e9/L    Absolute  Basophils 0.0 0.0 - 0.2 10e9/L    Abs Immature Granulocytes 0.0 0 - 0.4 10e9/L    Absolute Nucleated RBC 0.0    Comprehensive metabolic panel   Result Value Ref Range    Sodium 140 133 - 144 mmol/L    Potassium 4.1 3.4 - 5.3 mmol/L    Chloride 106 94 - 109 mmol/L    Carbon Dioxide 25 20 - 32 mmol/L    Anion Gap 10 3 - 14 mmol/L    Glucose 97 70 - 99 mg/dL    Urea Nitrogen 9 7 - 30 mg/dL    Creatinine 0.95 0.52 - 1.04 mg/dL    GFR Estimate 58 (L) >60 mL/min/1.7m2    GFR Estimate If Black 70 >60 mL/min/1.7m2    Calcium 8.6 8.5 - 10.1 mg/dL    Bilirubin Total 0.3 0.2 - 1.3 mg/dL    Albumin 3.1 (L) 3.4 - 5.0 g/dL    Protein Total 7.6 6.8 - 8.8 g/dL    Alkaline Phosphatase 63 40 - 150 U/L    ALT 16 0 - 50 U/L    AST 17 0 - 45 U/L   Nt probnp inpatient (BNP)   Result Value Ref Range    N-Terminal Pro BNP Inpatient 6471 (H) 0 - 900 pg/mL   Troponin I   Result Value Ref Range    Troponin I ES 0.117 (H) 0.000 - 0.045 ug/L   Lactic acid   Result Value Ref Range    Lactic Acid 1.3 0.7 - 2.1 mmol/L   Procalcitonin   Result Value Ref Range    Procalcitonin  ng/ml     <0.05  <0.05 ng/ml  Normal  Recommendation: Very low risk of bacterial infection.   Discourage antibiotics unless strong clinical suspicion for serious infection.     Blood gas venous   Result Value Ref Range    Ph Venous 7.42 7.32 - 7.43 pH    PCO2 Venous 42 40 - 50 mm Hg    PO2 Venous 43 25 - 47 mm Hg    Bicarbonate Venous 27 21 - 28 mmol/L    Base Excess Venous 2.4 mmol/L    FIO2 10L    INR   Result Value Ref Range    INR 2.56 (H) 0.86 - 1.14   Troponin POCT   Result Value Ref Range    Troponin I 0.00 0.00 - 0.10 ug/L   Influenza A/B antigen   Result Value Ref Range    Influenza A/B Agn Specimen Nutrient agar slant     Influenza A Negative NEG    Influenza B  NEG     Negative   Test results must be correlated with clinical data. If necessary, results   should be confirmed by a molecular assay or viral culture.       Medications   piperacillin-tazobactam (ZOSYN)  4.5 g vial to attach to  mL bag (4.5 g Intravenous New Bag 2/25/17 1957)   levofloxacin (LEVAQUIN) infusion 750 mg (not administered)   vancomycin (VANCOCIN) 2,000 mg in NaCl 0.9 % 500 mL intermittent infusion (not administered)            Labs Ordered and Resulted from Time of ED Arrival Up to the Time of Departure from the ED   COMPREHENSIVE METABOLIC PANEL - Abnormal; Notable for the following:        Result Value    GFR Estimate 58 (*)     Albumin 3.1 (*)     All other components within normal limits   NT PROBNP INPATIENT - Abnormal; Notable for the following:     N-Terminal Pro BNP Inpatient 6471 (*)     All other components within normal limits   TROPONIN I - Abnormal; Notable for the following:     Troponin I ES 0.117 (*)     All other components within normal limits   INR - Abnormal; Notable for the following:     INR 2.56 (*)     All other components within normal limits   CBC WITH PLATELETS DIFFERENTIAL   LACTIC ACID WHOLE BLOOD   PROCALCITONIN   BLOOD GAS VENOUS   ROUTINE UA WITH MICROSCOPIC   CARDIAC CONTINUOUS MONITORING   ISTAT TROPONIN NURSING POCT   TROPONIN POCT   BLOOD CULTURE   INFLUENZA A/B ANTIGEN   BLOOD CULTURE       Assessments & Plan (with Medical Decision Making)   Patient is a 71-year-old female with a history of high IPF on 10 L of oxygen at home awaiting a lung transplant who presented to the ER due to worsening shortness of breath.  Patient is unable to lay down without coughing.  Patient does not appear fluid overloaded on exam.  Labs show normal white count, normal procalcitonin, negative flu.  The patient s chest x-ray does show worsening IPF with concern for a right lower lobe pneumonia.  Patient was started on broad-spectrum antibiotic.  Patient has been on antibiotics for the past 2 weeks most recently yesterday she was changed from Z-pack to Augmentin.  At this time I feel she failed outpatient treatment due to failing oral antibiotics and worsening breathing.  Patient has  limited pulmonary reserve due to severe IPF. I also feel the patient has mild failure with her BNP being elevated.  Case was discussed with the medicine team who accepted the patient for admission    I have reviewed the nursing notes.    I have reviewed the findings, diagnosis, plan and need for follow up with the patient.  This part of the medical record was transcribed by Christine Villareal, Medical Scribe, from a dictation done by Dr. Rob  New Prescriptions    No medications on file       Final diagnoses:   Pneumonia of right lung due to infectious organism, unspecified part of lung   SOB (shortness of breath)   IPF (idiopathic pulmonary fibrosis) (H)   Acute systolic congestive heart failure (H)   Paroxysmal atrial fibrillation (H)   IChristine, am serving as a trained medical scribe to document services personally performed by Ruth Rob MD, based on the provider's statements to me. This document has been checked and approved by the attending provider.    Ruth COPE MD was physically present and have reviewed and verified the accuracy of this note documented by Christine Villareal.        2/25/2017   [facility], [place], EMERGENCY DEPARTMENT       Ruth Rob MD  02/28/17 [id number]

## 2017-02-25 NOTE — IP AVS SNAPSHOT
[address] [address]    Phone:  [phone]                                       After Visit Summary   2/25/2017    Sophia Johnson    MRN: [id number]           After Visit Summary Signature Page     I have received my discharge instructions, and my questions have been answered. I have discussed any challenges I see with this plan with the nurse or doctor.    ..........................................................................................................................................  Patient/Patient Representative Signature      ..........................................................................................................................................  Patient Representative Print Name and Relationship to Patient    ..................................................               ................................................  Date                                            Time    ..........................................................................................................................................  Reviewed by Signature/Title    ...................................................              ..............................................  Date                                                            Time

## 2017-02-26 PROBLEM — R09.02 HYPOXIA: Status: ACTIVE | Noted: 2017-01-01

## 2017-02-26 PROBLEM — I48.0 PAROXYSMAL ATRIAL FIBRILLATION (H): Status: ACTIVE | Noted: 2017-01-01

## 2017-02-26 NOTE — PLAN OF CARE
Problem: Goal Outcome Summary  Goal: Goal Outcome Summary  Outcome: No Change  Neuro: A&Ox4. Follows commands.  Cardiac: A-fib.  HR in 100's. BP's soft.           Respiratory: Sating in 90's on 10 L NC.  GI/: Has not voided since getting to unit. BM X0  Diet/appetite: Tolerating NPO/ice chip diet.  Activity:  Assist of 1 up to bathroom.  Pain: Denies  Skin: Intact, no new deficits noted.     Pt arrived to unit  at 0245. Pt was on 10 L O2 NC sating in the 90's, was Afebrile with A-fib , BP 92/82. Pt requested humidification on her O2 NC and refused oxymask. MD and RT notified and at bedside at 0300 to discuss oxygen needs/concerns with pt. MD gave the ok to let pt have humidification on her NC and will further address O2 needs on day shift. No further concerns this shift.Continue with POC. Notify primary team with changes.

## 2017-02-26 NOTE — PHARMACY-ANTICOAGULATION SERVICE
Clinical Pharmacy - Warfarin Dosing Consult     Pharmacy has been consulted to manage this patient s warfarin therapy.  Indication: Atrial Fibrillation  Therapy Goal: INR 2-3  Warfarin Prior to Admission: Yes  Warfarin PTA Regimen: 5 mg daily  Significant drug interactions: Zosyn    INR   Date Value Ref Range Status   02/25/2017 2.56 (H) 0.86 - 1.14 Final   02/22/2017 2.60 (H) 0.86 - 1.14 Final     Comment:     This test is intended for monitoring Coumadin therapy.  Results are not   accurate   in patients with prolonged INR due to factor deficiency.         Recommend warfarin 5 mg today.  Pharmacy will monitor Sophia Johnson daily and order warfarin doses to achieve specified goal.      Please contact pharmacy as soon as possible if the warfarin needs to be held for a procedure or if the warfarin goals change.      Ana Maria Encinas Pharm.D.

## 2017-02-26 NOTE — CONSULTS
"CARDIOLOGY CONSULT NOTE    Reason for consult: AF  Requesting team: Medicine    HPI: 71F Hx IPF with PH (PA mean 30) on 10L home O2, listed for lung transplant, HFpEF, and PFO, Hx of AF on coumadin, s/p successful DCCV x1 in , remained in SR for 2 years, not on AADs, was in her usual state of health until 3 days prior to admission when she developed URI symptoms, worsening cough, and dyspnea. In ER she was found to be in AF with -130s. BNP elevated to 6000s from 400 at baseline. She is also being treated for PNA. During vancomycin infusion she reportedly had symptoms of \"red-man syndrome.\" In the ER she also developed transient visual field deficits which self-resolved, concerning for TIA, neurology evaluated, recommended CTA brain, but pt has so far refused. She is now admitted to medicine for further management. Cardiology is consulted for assistance with management of atrial fibrillation.      PAST MEDICAL HISTORY:    Past Medical History   Diagnosis Date     Atrial fibrillation (H) 2015     Atypical squamous cell changes of undetermined significance (ASCUS) on cervical cytology with positive high risk human papilloma virus (HPV) age 32 & following     Chronic systolic heart failure (H)      Coughing      Fall against object 2005     Fell 35 feet     IPF (idiopathic pulmonary fibrosis) (H)      chest CT  showed pulm fibrosis but not diagnostic of IPF; VATS R lung bx 2013 +UIP; FAROOQ simutuzumab study until ; pirfenidone started .     LBP (low back pain)      On home oxygen therapy      uses when walking >3 minutes     Pneumonia      interstitial lung disease      Vitamin D deficiency        PAST SURGICAL HISTORY:    Past Surgical History   Procedure Laterality Date     Arthroscopy knee rt/lt       Right     Surgical history of -        left foot surgery, tendon had , cadaver bone     Laminect/discectomy, lumbar  Oct. 2010     kyphoplasty, vertebroplasty     Right " wrist surgery       Colposcopy cervix, biopsy cervix, endocervical curettage, combined  age?     Reported Benign      Foot surgery       L foot tendon     Thoracoscopic wedge resection lung  5/8/2013     Procedure: THORACOSCOPIC WEDGE RESECTION LUNG;  Right Thoracscopic Wedge Resection Anesthesia General with block;  Surgeon: José Peralta MD;  Location: [facility] OR     Anesthesia cardioversion N/A 6/3/2015     Procedure: ANESTHESIA CARDIOVERSION;  Surgeon: GENERIC ANESTHESIA PROVIDER;  Location: [facility] OR      esoph/gas reflux test w nasal imped >1 hr N/A 3/17/2016     Procedure: ESOPHAGEAL IMPEDENCE FUNCTION TEST WITH 24 HOUR PH GREATER THAN 1 HOUR;  Surgeon: Gonzalo Reveles MD;  Location: [facility] GI     Transplant         FAMILY HISTORY:    Family History   Problem Relation Age of Onset     Breast Cancer Mother      CANCER Mother      Breast Cancer     CANCER Father      bone and liver     CEREBROVASCULAR DISEASE Maternal Grandfather        SOCIAL HISTORY:     Social History   Substance Use Topics     Smoking status: Passive Smoke Exposure - Never Smoker     Years: 2.00     Types: Cigarettes     Smokeless tobacco: Never Used      Comment: Smoked in college, a cigarette here and there     Alcohol use No      Comment: 3 drinks a week         HOME MEDICATIONS:    Prior to Admission medications    Medication Sig Start Date End Date Taking? Authorizing Provider   calcium-vitamin D (CALTRATE) 600-400 MG-UNIT per tablet Take 1 tablet by mouth daily   Yes Unknown, Entered By History   amoxicillin-clavulanate (AUGMENTIN) 875-125 MG per tablet Take 1 tablet by mouth 2 times daily 2/24/17  Yes Lavinia Bowman MD   metoprolol (TOPROL-XL) 25 MG 24 hr tablet TAKE ONE TABLET BY MOUTH ONE TIME DAILY  12/23/16  Yes Mario Martin MD   pirfenidone (ESBRIET) 267 MG capsule Take 3 capsules (801 mg) by mouth 3 times daily (with meals) 12/7/16  Yes Lavinia Bowman MD   SUNSCREEN SPF50 LOTN Apply as needed. 6/22/16  Yes  Lesley Christianson MD   lisinopril (PRINIVIL,ZESTRIL) 2.5 MG tablet Take 1 tablet (2.5 mg) by mouth daily 6/20/16  Yes Mario Martin MD   spironolactone (ALDACTONE) 25 MG tablet TAKE ONE TABLET BY MOUTH EVERY DAY 3/29/16  Yes Mario Martin MD   Ascorbic Acid (VITAMIN C PO)    Yes Reported, Patient   omeprazole (PRILOSEC) 20 MG capsule Take 1 capsule (20 mg) by mouth daily 10/14/15  Yes Perlman, David Morris, MD   order for DME Patient currently gets oxygen form [facility] [place].  Please provide new liquid system to meet increased need.  Patient requires 8 liters oxygen via nasal cannula with oximyzer to maintain oxygen saturation above 88% with activity.  This is for lifetime use. 10/6/15  Yes Lavinia Bowman MD   warfarin (COUMADIN) 7.5 MG tablet Take 1 tablet (7.5 mg) by mouth daily 9/9/15  Yes Mario Martin MD   order for DME Please provide patient with POC (portable oxygen concentrator).  Patient currently uses 2-3 LPM continuous flow oxygen. 9/4/15  Yes Lavinia Bowman MD   ORDER FOR DME Please provide patient with second liquid concentrator for filling smaller tanks.  Patient goes through more oxygen since recent hospitalization and needs a second unit at home. 4/10/15  Yes Lavinia Bowman MD   guaiFENesin-codeine (CHERATUSSIN AC) 100-10 MG/5ML SOLN solution Take 5-10 mLs by mouth every 4 hours as needed for cough 2/24/17   Lavinia Bowman MD   warfarin (COUMADIN) 5 MG tablet Take 5 mg daily or as instructed by coumadin clinic nurse 9/23/16   Lesley Christianson MD   alendronate (FOSAMAX) 70 MG tablet Take 1 tablet (70 mg) by mouth every 7 days 5/13/16   Lesley Christianson MD   torsemide (DEMADEX) 10 MG tablet Take 1 tablet (10 mg) by mouth daily as needed 5/7/15   Graciela Damon APRN CNP       VITAL SIGNS:  Temp: 97.2  F (36.2  C)   BP: (!) 123/93 Pulse: 70 Heart Rate: 116 Resp: 16 SpO2: 95 % O2 Device: Nasal cannula Oxygen Delivery: 10 LPM    0 lbs 0 oz  No intake or output data in the 24  hours ending 02/26/17 0203      PHYSICAL EXAM:  Gen: Comf NAD  HEENT: JVP upper 1/3 of neck at 90 degrees  Resp: Dry crackles troughout  CVS: S1s2 irreg irreg tachy  Abdo: SNTND  Extremities: warm, well perfused, 1+ LE edema    Labs:   Lab Results   Component Value Date    HGB 13.0 02/25/2017     Lab Results   Component Value Date    HCT 39.7 02/25/2017     Lab Results   Component Value Date    MCV 94 02/25/2017     Lab Results   Component Value Date    WBC 8.6 02/25/2017     Lab Results   Component Value Date     02/25/2017     Lab Results   Component Value Date    RDW 13.5 02/25/2017     Lab Results   Component Value Date     02/25/2017      Lab Results   Component Value Date    POTASSIUM 4.1 02/25/2017     Lab Results   Component Value Date    CHLORIDE 106 02/25/2017     Lab Results   Component Value Date    ANJALI 8.6 02/25/2017     Lab Results   Component Value Date    CO2 25 02/25/2017     Lab Results   Component Value Date    BUN 9 02/25/2017     Lab Results   Component Value Date    CR 0.95 02/25/2017     Lab Results   Component Value Date    GLC 97 02/25/2017     Lab Results   Component Value Date    PROTTOTAL 7.6 02/25/2017    ALBUMIN 3.1 02/25/2017    BILITOTAL 0.3 02/25/2017    ALKPHOS 63 02/25/2017    AST 17 02/25/2017    ALT 16 02/25/2017           EKG: AF 120s  EKG (05/2016) SR    TTE (1/9/17): Interpretation Summary  Borderline (EF 50-55%) reduced left ventricular function is present. Traced  at 52%.  Paradoxical septal motion consistent with right ventricular pressure overload  is present.  Mild right ventricular dilation is present. Global right ventricular function  is mildly to moderately reduced.  Right ventricular systolic pressure is 35mmHg above the right atrial  pressure.  The inferior vena cava was normal in size with preserved respiratory  Variability.    LA volume index 25     No significant change from prior.      RHC (01/2017):   RA 2  PA 45/20/30  PCWP 5  PVR 5.4  CI  2.2    Angio (4/205): Non obstructive CAD    Trop 0.123<-0.117    NTP BNP 6471 (<-447 baseline)    ASSESSMENT/PLAN:  71F with idiopathic pulmonary fibrosis on 10L home O2, HFpEF, admitted to medicine for worsening dyspnea and volume overload, found to have recurrence of atrial fibrillation.    # AF  -- for tonight continue with rate control strategy: continue home metoprolol xl 25. Use additional metoprolol 5 ivp as needed to keep HR   -- regarding rhythm control strategy, this patient does not tolerate atrial fibrillation, and she may be reliant on atrial systole for LV filling. Would favor another attempt at DCCV this admission. Given almost 2 years in sinus rhythm after previous DCCV, and normal LA volume index, another attempt at DCCV without AADs seems reasonable. INR has been therapeutic for 4 weeks (therapeutic on weekly draws since 1/24/17), so patient could be a candidate for DCCV without TREVA. However, her INR was subtherapeutic prior to 1/24/17 and there is concern for a new CNS event this admission, so TREVA would likely be prudent. Given underlying lung disease we will have to weigh the risks and benefits of these procedures and the risks of venice-procedural sedation, with the patient, primary team, and anesthesia, and it would probably be best to consider this for Monday when all teams are present.  -- cont coumadin for stroke prophylaxis, INR therapeutic, CHADSVASc=3  -- would also rx trial of lasix 20 iv x 1 for volume overload    Preliminary plan of care. Will be staffed by day team with attending on rounds. Thank you for including our team in the care of this patient.    Silas Stevens  Cardiology Fellow  [phone]      I have seen, interviewed, and examined patient. I reviewed the management plan with the patient.  I have reviewed the laboratory tests, imaging, and other investigations.  Discussed with the team and agree with the findings and plan in this resident/fellow/nurse practitioner's  note. In addition, changes in the physical examination, assessment and plan have been incorporated into the note by myself, as to make it a single cohesive document. Plan was communicated to referring team/physician.      Bethany Mansfield MD, MS  Cardiology/Cardiac EP Attending Staff

## 2017-02-26 NOTE — ED NOTES
9:41 PM Patient reports a right visual field defect, acute. I examined the patient and confirmed the complaint, it seems to be improving, discussed with Neurology who will see the patient. Stroke code not activated as it seems to be resolving     Raheel Thomas MD  02/25/17 [id number]

## 2017-02-26 NOTE — PROGRESS NOTES
Pt arrived on unit 6b at 0245. Pt was on 10 L O2 NC sating in the 90's, was Afebrile with  A-fib , BP 92/82. Pt refused oxymask. MD and RT notified and at bedside at 0300 to discuss oxygen needs/concerns with pt.

## 2017-02-26 NOTE — ED NOTES
11:43 PM The patient was seen by Neurology who recommends CTA head and neck. This was ordered. The patient declined to have the test. I spoke to her about our concerns. She believes the symptom was related to the vancomycin and she doesn't want any imaging for possible TIA     Raheel Thomas MD  02/25/17 [id number]

## 2017-02-26 NOTE — PROGRESS NOTES
"Cardiology Progress Note    Events and interval changes in past 24 hours:   No new events. Afib rates 120s SBP 80s-90s    OBJECTIVE FINDINGS:  BP 98/74  Pulse 70  Temp 98.3  F (36.8  C) (Oral)  Resp 20  Ht 1.753 m (5' 9\")  SpO2 98%    Gen: Patient is AOx3, in NAD. Appears comfortable.    CV: irregular tachy, no murmurs/gallops      Intake/Output Summary (Last 24 hours) at 02/26/17 1351  Last data filed at 02/26/17 0822   Gross per 24 hour   Intake                0 ml   Output              450 ml   Net             -450 ml     Wt Readings from Last 5 Encounters:   01/11/17 91.2 kg (201 lb)   01/09/17 91.2 kg (201 lb)   01/05/17 89.8 kg (198 lb)   09/22/16 89.8 kg (198 lb)   09/07/16 89.8 kg (198 lb)         Labs  Reviewed in epic    71F Hx IPF with PH (PA mean 30) on 10L home O2, listed for lung transplant, HFpEF, and PFO, Hx of AF on coumadin, s/p successful DCCV x1 in 2015, remained in SR for 2 years, not on AADs, was in her usual state of health until 3 days prior to admission when she developed URI symptoms, worsening cough, and dyspnea. In ER she was found to be in AF with -130s. BP has been soft SBP 80s-90s    1. Recurrent atrial fibrillation with RVR  -suspect sympathetic drive from lung disease. Afib is secondary.   -will tolerate rate <130. Can use esmolol drip if needed for if BP soft  -plan for DC cardioversion tomorrow.  - INR has been therapeutic last 4 months    2. Soft blood pressure  -query cardiomyopathy. F/u echo. Primary team to work up other causes of low blood pressure    Staffed with Dr. Shyla Wang  General cards fellow, PGY4  Pager [phone]      I have seen, interviewed, and examined patient. I reviewed the management plan with the patient.  I have reviewed the laboratory tests, imaging, and other investigations.  Discussed with the team and agree with the findings and plan in this resident/fellow/nurse practitioner's note. In addition, changes in the physical examination, " assessment and plan have been incorporated into the note by myself, as to make it a single cohesive document. Plan was communicated to referring team/physician.      Bethany Mansfield MD, MS  Cardiology/Cardiac EP Attending Staff

## 2017-02-26 NOTE — PROGRESS NOTES
[facility], [place]      Neurology Stroke Consult    Patient Name: Sophia Johnson  : 1945 MRN#: [id number]    STROKE DATA    Stroke Code:  Stroke code not indicated.    TPA treatment:  Not given due to minor / isolated / quickly resolving stroke symptoms, current / recent anticoagulant use with INR > 1.7.     National Institutes of Health Stroke Scale (at presentation)  NIHSS done at:  time patient seen      Score    Level of consciousness:  (0)   Alert, keenly responsive     LOC questions:  (0)   Answers both questions correctly    LOC commands:  (0)   Performs both tasks correctly    Best gaze:  (0)   Normal    Visual:  (0)   No visual loss    Facial palsy:  (0)   Normal symmetrical movements    Motor arm (left):  (0)   No drift    Motor arm (right):  (0)   No drift    Motor leg (left):  (0)   No drift    Motor leg (right):  (0)   No drift    Limb ataxia:  (0)   Absent    Sensory:  (0)   Normal- no sensory loss    Best language:  (0)   Normal- no aphasia    Dysarthria:  (0)   Normal    Extinction and inattention:  (0)   No abnormality        NIHSS Total Score:  0         ASSESSMENT & RECOMMENDATIONS     The patient was seen for R visual field cut.  The differential diagnosis includes stroke and TIA.     Impression: transient neurologic symptoms consistent with posterior circulation stroke/TIA with known risk factors for stroke of atrial fibrillation, hyperlipidemia, HTN, PFO    Recommendations:  As stated in Dr. Bliss's initial consult note, recommendations include evaluation for stroke/TIA:  -CT/CTA head/neck  -q4 hour neuro checks  -initiate statin for   -MRI brain w/o contrast ([id number])  -TTE with bubble study  -telemetry  -A1c    As Mrs. Johnson is convinced that her symptoms were related to infusion of vancomycin, we respect her decision to not undergo further evaluation for stroke/TIA, though discussion of risks and benefits of evaluation of stroke-like symptoms  "prior to a large, debilitating stroke was had with the patient and her  at the bedside. It was also made clear that it is possible that she did in fact have a stroke with quickly resolving symptoms that would only be detectable on MR imaging.     Should she decide to go forward with further workup, please contact the Stroke Neurology service and we would be glad to see her again. For now, we will sign off.    Subjective  No further visual complaints, denies headache, weakness, sensory or strength complaints. Says she is feeling okay now, though still dyspneic. She says she used to have migraine headaches preceded by \"squiggly lines\" and black spots in her R visual field. She denies headache after this episode.    PHYSICAL EXAMINATION  Vital Signs:  B/P: 84/63,  T: 98.1,  P: 70,  R: 24    General:  patient lying in bed without any acute distress    HEENT:  normocephalic/atraumatic  Cardio:  irregularly irregular  Pulmonary:  no respiratory distress  Abdomen:  soft, non-tender, non-distended  Extremities:  no edema  Skin:  intact, warm/dry     Neurologic  Mental Status:  fully alert, attentive and oriented, follows commands, speech clear and fluent  Cranial Nerves:  visual fields intact, PERRL, EOMI with normal smooth pursuit, facial sensation intact and symmetric, facial movements symmetric, hearing not formally tested but intact to conversation, palate elevation symmetric and uvula midline, no dysarthria, shoulder shrug strong bilaterally, tongue protrusion midline  Motor:  no abnormal movements, normal tone throughout, no pronator drift, strength 5/5 throughout upper and lower extremities  Reflexes:  2+ and symmetric throughout, no clonus, toes downgoing  Sensory:  intact/symmetric to light touch and pin prick throughout upper and lower extremities  Coordination:  FNF and HS intact without dysmetria  Station/Gait:  unable to test    Labs  Labs and Imaging reviewed and used in developing the plan; pertinent " results included.   Recent Labs   Lab Test  02/26/17   0523  03/14/16   0845  04/07/14   0916  09/06/13   1054   CHOL  158  186  192  192   HDL  43*  51  52  55   LDL  102*  116*  121  118   TRIG  68  94  92  92   CHOLHDLRATIO   --    --   3.7  3.5     Lab Results   Component Value Date    A1C 5.7 04/01/2015     The patient was seen and discussed with the Fellow, Dr. Elizalde, and the staff, Dr. Baca.    Melba Camarillo MD  Neurology PGY-1  Pager: [phone]

## 2017-02-26 NOTE — PHARMACY-ADMISSION MEDICATION HISTORY
Admission medication history interview status for the 2/25/2017 admission is complete. See Epic admission navigator for allergy information, pharmacy, prior to admission medications and immunization status.     Medication history interview sources:  patient, patient's     Changes made to PTA medication list (reason)  Added: calcium+D  Deleted: Azithromycin- course complete per patient; omeprazole- duplicate; tramadol- per patient not taking  Changed: None    Additional medication history information (including reliability of information, actions taken by pharmacist):  -Patient appears to be a reliable historian and is familiar with all of her medications and most of the doses  -Has not required torsemide for >1 year but does have rx available (monitors for peripheral edema)  -Takes alendronate on Tuesdays  -Unsure of vitamin C dose  -Completed azithromycin course earlier this week, just took first dose of amox-clav last night  -Has not taken any guaifenesin-codeine yet as this is a new rx  -Pt's  will bring in home supply of pirfenidone tomorrow  -Takes warfarin for A.fib, follows with [facility] anticoag clinic-- reports being stable on 7.5mg warfarin on Fridays and 5mg all other days of the week- her last dose was yesterday (7.5mg)  -Received flu shot in November    Prior to Admission medications    Medication Sig Last Dose Taking? Auth Provider   calcium-vitamin D (CALTRATE) 600-400 MG-UNIT per tablet Take 1 tablet by mouth daily 2/25/2017 at AM Yes Unknown, Entered By History   amoxicillin-clavulanate (AUGMENTIN) 875-125 MG per tablet Take 1 tablet by mouth 2 times daily 2/24/2017 at PM Yes Lavinia Bowman MD   metoprolol (TOPROL-XL) 25 MG 24 hr tablet TAKE ONE TABLET BY MOUTH ONE TIME DAILY  2/25/2017 at AM Yes Mario Martin MD   pirfenidone (ESBRIET) 267 MG capsule Take 3 capsules (801 mg) by mouth 3 times daily (with meals) 2/25/2017 x2 doses today Yes Lavinia Bowman MD   SUNSCREEN SPF50 LOTN  Apply as needed. Past Month at Unknown time Yes Lesley Christianson MD   lisinopril (PRINIVIL,ZESTRIL) 2.5 MG tablet Take 1 tablet (2.5 mg) by mouth daily 2/25/2017 at AM Yes Mario Martin MD   spironolactone (ALDACTONE) 25 MG tablet TAKE ONE TABLET BY MOUTH EVERY DAY 2/25/2017 at AM Yes Mario Martin MD   Ascorbic Acid (VITAMIN C PO)  2/25/2017 at AM Yes Reported, Patient   omeprazole (PRILOSEC) 20 MG capsule Take 1 capsule (20 mg) by mouth daily 2/25/2017 at AM Yes Perlman, David Morris, MD   order for DME Patient currently gets oxygen form [facility] [place].  Please provide new liquid system to meet increased need.  Patient requires 8 liters oxygen via nasal cannula with oximyzer to maintain oxygen saturation above 88% with activity.  This is for lifetime use. Past Month at Unknown time Yes Lavinia Bowman MD   warfarin (COUMADIN) 7.5 MG tablet Take 1 tablet (7.5 mg) by mouth daily 2/24/2017 at PM Yes Mario Martin MD   order for DME Please provide patient with POC (portable oxygen concentrator).  Patient currently uses 2-3 LPM continuous flow oxygen. Past Month at Unknown time Yes Lavinia Bowman MD   ORDER FOR DME Please provide patient with second liquid concentrator for filling smaller tanks.  Patient goes through more oxygen since recent hospitalization and needs a second unit at home. Past Month at Unknown time Yes Lavinia Bowman MD   guaiFENesin-codeine (CHERATUSSIN AC) 100-10 MG/5ML SOLN solution Take 5-10 mLs by mouth every 4 hours as needed for cough New med  Lavinia Bowman MD   warfarin (COUMADIN) 5 MG tablet Take 5 mg daily or as instructed by coumadin clinic nurse 2/23/2017 at PM  Lesley Christianson MD   alendronate (FOSAMAX) 70 MG tablet Take 1 tablet (70 mg) by mouth every 7 days 2/21/2017 at AM  Lesley Christianson MD   torsemide (DEMADEX) 10 MG tablet Take 1 tablet (10 mg) by mouth daily as needed More than a month at Unknown time  Wick, Graciela Kimmie, APRN CNP     Medication history  completed by:     Romelia Coello, PharmD  PGY-2 Critical Care Pharmacy Resident

## 2017-02-26 NOTE — H&P
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       A/C hypoxic respiratory failure in patient with known IPF: currently transplant listed.  Worsening sx of garrison, PND, and fatigue over the past 2 weeks. More recently, since 2/21, patient with severe coughing spell, chest pressure, tachycardia, and small amount of green sputum production. No fevers, rhinorrhea, sore throat, sick contacts. ED eval:  EKG with A.fib with RVR and and T wave inversion III, AVF, V3-V6 (similar to most recent EKG 5/2016). CXR with new hazy opacity in left base- atelectasis vs consolidation and noted chronc bilateral interstitial fibrotic changes. NT-BNP elevated 6471, Troponin elevated at 0.117 (elevated to 3.0-4.0 rage in past ), Procalcitonin negative at < 0.05, LA 1.3, VBG 7.42/42/43/27, Negative Influenza Ag, WBC 8.6, Afebrile, no tachycardia. BP soft,but stable. Received Azithromycin w/o improvement in sx. Prescribed course of Augmentin (2/24/17) and has taken one dose. Initiated on Vanc, Zosyn, and Levaquin in ED for suspected PNA, though given negative procalcitonin, LA normal, afebrile and w/o leukocytosis- strong suspicion that current sx are attributed to heart failure vs disease progression and less likely infections, though cannot r/o given new hazy opacity in left base.   - Continue Vanc and Zosyn IV antibiotics tonight. No Levaquin use with Esbriet  - Serial Troponin q6h x 2  - Sputum Culture  - Strep and Legionella Urine Ag  -  Check Respiratory viral panel  - O2 to maintain sats > 90%  - Pulmonology consult- placed  - Cardiology consult- placed  - Cardiac tele  - Continuous pulse Ox  - Codeine Cough syrup PRN  - Admit to 6B    Acute Transient Right homonymous hemianopsia: Per Triage MD (who was present in ED when sx began), patient with reports of complete loss of vision on the right side for 5 minutes. Neurology consulted. No TPA given d/t quickly resolving sx. DDX at this time is TIA. Patient anticoagulated, though with multiple CVA RF.  - Neurology consult- appreciate recommendations  - Q4h Neuro checks  - Tele  - CT head neck Angio w/o and w/contrast  - Bilateral Carotid US  - Bilateral LE duplex with dopplers as patient with hx of PFO  - Further recommendations per Neurology   - Patient signed out to Night MD who will follow  - Contacted by RN who states patient is refusing CT head. ED MD to discuss with patient.   - Patient signed out to Night Triage who will follow    Paroxysmal A.fib: [id number]: 3. S/p cardioversion (6/2015). On coumadin. INR on admission therapeutic at 2.56. EKG with A.fib RVR and T wave inversions similar to previous. Patient states that she discussed her current sx with Cardiology 2/24/17 and was to f/u as o/p, though sx have worsened prompting ED eval. Per patient, Cardioversion was discussed as patient has been in NSR since cardioversion.   - Cardiology consult. Discussed with below. No current indication for urgent cardioversion.   - Pharmacy Consult to dose Warfarin  - POC ECHO pending from ED  - Cardiac tele  - Continue PTA Metoprolol 25 mg PO qam  - Will hold off on amiodarone given pulmonary toxicity. 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 EF 50-55%. [facility], with recent clinic visit 1/09/17- Dr. Martin, NYHA class IIIB. PTA lisinopril, metoprolol, aldactone. No  reports of weight gain. Does endorse progressive GARRISON- now with severe limitation, PND, and increasing O2 needs. NT-BNP elevated to 6471 (400-1/9/17). Appears euvolemic.   - Discussed with Cardiology with recommendations for gentle diuresis- Will give 20 mg of lasix x 1  - ECHO in am  - Cardiology consult- appreciate recs    Suspected Red ManSyndrome: Contacted by RN regarding patient with complaints of facial flushing and pruritis ~ 30 minutes into infusion. No hypertension, lip or tongue swelling, or reported muscle aches.    - Plan to decrease infusion rate  - Give 50 mg IV benadryl x 1  - Give 50 mg IV ranitidine x 1  - Continue to closely monitor      Mild Pulmonary Hypertension: 2/2 pulmonary disease. Recent RHC (1/11/17) revealed mild progression of her pulmonary HTN (PA pressure 30) . Per recent Cardiology note (1/9/17) noted the patient with PFO and she may have ritght->left shunting d/t Pulmonary HTN, though closing this may be detrimental to her RV function.   - Cardiology consult       Discussed with Dr. Anguiano.     FEN: No IVF, Low Na diet  Prophylaxis: Mechanical, SCD's. Chronic anticoagulation on warfarin   Code Status: Full Code    Angie Romero PA-C  Hospitalist Medicine  Pager: [phone]         Chief Complaint:   GARRISON, chest pressure         History of Present Illness:   Sophia Johnson is a 71 year old female with complex pulmonary and cardiac history who presented to the ED with complaints of progressive garrison, productive cough, PND, and increasing O2 needs .    Patient states that over the past 2 months she has been experiencing increase in O2 needs (8l --> 10L) and garrison. She has completed a course of azithromycin w/o improvement in sx. Patient reports that 2/21/17 she experienced a severe coughing spell with associated chest pressure, slight green sputum production, and worsening of garrison and PND (4 pillow). She has previously experienced the sx of chest pressure with A.fib (4/2015). No pain  radiating down arm, or CP noted- just pressure. SAUNDERS is reported as severely limiting and she is unable to climb stairs or walk to the bathroom ~ 10 feet without sats dropping to 70%. Patient discussed her worsening sx with Cardiology (2/24/17) with plan course of Augmentin and o/p f/u on 2/27/17 if sx were stable. Sx are reported as intolerable prompting ED evaluation.     Patient does not endorse: headaches, chest pain, palpitations, upper respiratory symptoms of rhinorrhea or congestion, wheezing, abdominal pain, nausea, emesis, constipation, diarrhea, dysuria, edema, rashes, weakness, focal neurologic deficits, recent travel, fever, chills.    In ED patient with noted soft, though stable BP's. Sats 96% on 10L O2, RR 20's. Able to speak in full sentences and w/o accessory muscle use. No coughing noted on exam. Asking to eat.  at bedside. Reports ongoing chest pressure and saunders.      Updated by Triage MD that patient experienced a Right vision loss x 5 minutes. Neurology was consulted. Triage MD evaluated patient and will defer to Neurology recs.     Updated by ED RN that patient flushed and inching after Vancomycin was infusing. Also noted that patient with complaints of  Bilateral LE swelling, and refusing the Head CT recommended by Neurology.             Review of Systems:   A 10 point review of systems was performed and is negative unless otherwise noted in HPI.          Past Medical History:     Past Medical History   Diagnosis Date     Atrial fibrillation (H) 4/7/2015     Atypical squamous cell changes of undetermined significance (ASCUS) on cervical cytology with positive high risk human papilloma virus (HPV) age 30 & following     Chronic systolic heart failure (H)      Coughing      Fall against object 2/2005     Fell 35 feet     IPF (idiopathic pulmonary fibrosis) (H)      chest CT 2010 showed pulm fibrosis but not diagnostic of IPF; VATS R lung bx 5/2013 +UIP; FAROOQ simutuzumab study until  ; pirfenidone started .     LBP (low back pain)      On home oxygen therapy      uses when walking >3 minutes     Pneumonia      interstitial lung disease      Vitamin D deficiency              Past Surgical History:      Past Surgical History   Procedure Laterality Date     Arthroscopy knee rt/lt       Right     Surgical history of -        left foot surgery, tendon had , cadaver bone     Laminect/discectomy, lumbar  Oct. 2010     kyphoplasty, vertebroplasty     Right wrist surgery       Colposcopy cervix, biopsy cervix, endocervical curettage, combined  age?     Reported Benign      Foot surgery       L foot tendon     Thoracoscopic wedge resection lung  2013     Procedure: THORACOSCOPIC WEDGE RESECTION LUNG;  Right Thoracscopic Wedge Resection Anesthesia General with block;  Surgeon: José Peralta MD;  Location: [facility] OR     Anesthesia cardioversion N/A 6/3/2015     Procedure: ANESTHESIA CARDIOVERSION;  Surgeon: GENERIC ANESTHESIA PROVIDER;  Location:  OR      esoph/gas reflux test w nasal imped >1 hr N/A 3/17/2016     Procedure: ESOPHAGEAL IMPEDENCE FUNCTION TEST WITH 24 HOUR PH GREATER THAN 1 HOUR;  Surgeon: Gonzalo Reveles MD;  Location:  GI     Transplant               Family History:     Family History   Problem Relation Age of Onset     Breast Cancer Mother      CANCER Mother      Breast Cancer     CANCER Father      bone and liver     CEREBROVASCULAR DISEASE Maternal Grandfather              Social History:     Social History   Substance Use Topics     Smoking status: Passive Smoke Exposure - Never Smoker     Years: 2.00     Types: Cigarettes     Smokeless tobacco: Never Used      Comment: Smoked in college, a cigarette here and there     Alcohol use No      Comment: 3 drinks a week             Medications:     No current facility-administered medications on file prior to encounter.   Current Outpatient Prescriptions on File Prior to  Encounter:  guaiFENesin-codeine (CHERATUSSIN AC) 100-10 MG/5ML SOLN solution Take 5-10 mLs by mouth every 4 hours as needed for cough   amoxicillin-clavulanate (AUGMENTIN) 875-125 MG per tablet Take 1 tablet by mouth 2 times daily   azithromycin (ZITHROMAX) 250 MG tablet Take 2 tablets (500 mg) the first day, then 1 tablet daily.   metoprolol (TOPROL-XL) 25 MG 24 hr tablet TAKE ONE TABLET BY MOUTH ONE TIME DAILY    pirfenidone (ESBRIET) 267 MG capsule Take 3 capsules (801 mg) by mouth 3 times daily (with meals)   warfarin (COUMADIN) 5 MG tablet Take 5 mg daily or as instructed by coumadin clinic nurse   omeprazole (PRILOSEC) 20 MG capsule Take 1 capsule (20 mg) by mouth daily   traMADol (ULTRAM) 50 MG tablet Take 1-2 tabs three times daily for pain as needed   SUNSCREEN SPF50 LOTN Apply as needed.   lisinopril (PRINIVIL,ZESTRIL) 2.5 MG tablet Take 1 tablet (2.5 mg) by mouth daily   alendronate (FOSAMAX) 70 MG tablet Take 1 tablet (70 mg) by mouth every 7 days   spironolactone (ALDACTONE) 25 MG tablet TAKE ONE TABLET BY MOUTH EVERY DAY   Ascorbic Acid (VITAMIN C PO)    omeprazole (PRILOSEC) 20 MG capsule Take 1 capsule (20 mg) by mouth daily   order for DME Patient currently gets oxygen form [facility].  Please provide new liquid system to meet increased need.  Patient requires 8 liters oxygen via nasal cannula with oximyzer to maintain oxygen saturation above 88% with activity.  This is for lifetime use.   warfarin (COUMADIN) 7.5 MG tablet Take 1 tablet (7.5 mg) by mouth daily   order for DME Please provide patient with POC (portable oxygen concentrator).  Patient currently uses 2-3 LPM continuous flow oxygen.   torsemide (DEMADEX) 10 MG tablet Take 1 tablet (10 mg) by mouth daily as needed   ORDER FOR DME Please provide patient with second liquid concentrator for filling smaller tanks.  Patient goes through more oxygen since recent hospitalization and needs a second unit at home.   cholecalciferol (VITAMIN D3) 1000  "UNIT tablet Take 2,000 Units by mouth Once daily            Allergies:     Allergies   Allergen Reactions     Vicodin [Hydrocodone-Acetaminophen]      Headache               Physical Exam:   /76  Pulse 70  Temp 97.2  F (36.2  C)  Resp (!) 32  Ht 1.753 m (5' 9\")  SpO2 94%   GENERAL: Pleasant female sitting up in bed. Propped up on pillow. Alert and oriented x 3.  at bedside. Tearful at times. Speaking in complete sentences, No accessory muscle use.    HEENT: Anicteric sclera. PERRL. Mucous membranes moist and without lesions.   CV: Irregularly, irregular. No murmurs appreciated.   RESPIRATORY: Effort normal on 10L via NC. Lungs with diffuse crackles.   GI: Abdomen soft and non distended with normoactive bowel sounds present in all quadrants. No tenderness, rebound, guarding.   MUSCULOSKELETAL: No joint swelling or tenderness. Moves all extremities.   NEUROLOGICAL: No focal deficits. Strength 5/5 bilaterally in upper and lower extremities.   EXTREMITIES: Trace peripheral edema. Intact bilateral pedal pulses.   SKIN: No jaundice. No rashes to exposed skin areas.          Labs:   CBC:  Recent Labs   Lab Test  02/25/17   1740   WBC  8.6   RBC  4.22   HGB  13.0   HCT  39.7   MCV  94   MCH  30.8   MCHC  32.7   RDW  13.5   PLT  243       CMP:  Recent Labs   Lab Test  02/25/17   1740   NA  140   POTASSIUM  4.1   CHLORIDE  106   ANJALI  8.6   CO2  25   BUN  9   CR  0.95   GLC  97   AST  17   ALT  16   BILITOTAL  0.3   ALBUMIN  3.1*   PROTTOTAL  7.6   ALKPHOS  63       INR:   Recent Labs   Lab Test  02/25/17   1740   INR  2.56*            Imaging:   Exam: XR CHEST 2 VW, 2/25/2017 7:17 PM     Indication: sob     Comparison: 3/14/2016, 4/1/2015     Findings:   AP and lateral views of the chest were obtained. Decreased lung  volumes. The cardiac silhouette is at the upper limits of normal. No  pneumothorax or pleural effusion. Redemonstration of basilar  predominant interstitial fibrotic changes. New left basilar " hazy  airspace opacity. Visualized upper abdomen is unremarkable.         Impression:   1. New hazy opacity in the left base may represent atelectasis and/or  consolidation.  2. Chronic bilateral interstitial fibrotic changes.                   Physician Attestation    Date of Service: Feb 25, 2017    Attending Exam: Patient has been seen and evaluated by me. Discussed with the team and agree with the findings and plan in this note.     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 On exam: Lung BL diffuse inspiratory crackles, S1S2 irregular, abodmen nontender, legs no edema. She has Acute Hypoxic respiratory failure from acute on chronic diastolic HF from Afib with RVR. There may be worsening of her interstitial lung Dz and we can not rule out underlying pneumonia. We will diures with lasix and control HR with metoprolol. We will keep her on empiric vanc and zosyn. Rest as outlined above.     Michael Anguiano MD  Hospitalist,  Health

## 2017-02-26 NOTE — PROGRESS NOTES
Internal Medicine Daily Note    Date of Service: 2/26/2017    Patient: Sophia Johnson  MRN: [id number]  Admission Date: 2/25/2017  Hospital Day # 0      ASSESSMENT & PLAN:  Sophia Johnson is a 71 year old female with hx IPF with chronic hypoxia, pulmonary HTN, CHF (NYHA stage IIIb) with biventricular systolic dysfunction, and PAF on chronic anticoagulation, who presented with several days of progressive dyspnea, cough and chest pressure. Admitted with acute/chronic hypoxia and a-fib w RVR.     Acute on chronic hypoxic respiratory failure:  Chronic hypoxia secondary to IPF with baseline home O2 at 8 L. Acute worsening of symptoms several days ago, now unable to maintain sats. Currently on 10 L NC and desats with minimal exertion. CXR on admission shows chronic bibasilar interstitial fibrotic changes with new LLL opacity concerning for atelectasis vs infiltrate. Afebrile. WBC normal. Procalcitonin normal. No sputum production. Rapid influenza screen negative. Acute infectious process not suspected. BNP 6471. Similar presentation in 4/2015 at which time she was diagnosed w tachycardia-induced cardiomyopathy. Currently in a-fib w RVR but no significant evidence of volume overload on exam. Cannot r/o progression of chronic lung disease vs exacerbation. PE and infection are felt to be unlikely d/t therapeutic INR. Patient refused IV lasix on admission. Currently stable on 10 L w/o clinical improvement.     Pulmonary consulted. CT chest w/o contrast pending.     Very little evidence to support active infection; will d/c IV zosyn.     Management of A-fib and CHF as outlined below.    Consider diuresis if CT shows evidence of pulmonary edema    Wean O2 as able. Maintain SpO2 >90%.    Further recomendations from pulmonary and cardiology appreciated.     PAF w RVR:  Initial episode of a-fib w RVR in 4/2015 c/b tachy-induced cardiomyopathy, treated w DC cardioversion. Has maintained NSR since then. Maintained on  metoprolol XL and coumadin. INR therapeutic. Patient reports onset of worsening dyspnea and intermittent tachycardia (ntoed on home pulse-ox) approx 5 days ago. Patient remains in a-fib with RVR, however treatment options limited d/t low BP. Cardiology consulted, considering DC cardioversion 2/27. INR has been therapeutic, therefore TREVA may not be necessary.     Cont metoprolol XL 25mg QD with hold parameters.    IV metoprolol 5mg q 4hr PRN for sustained HR >120    Consider digoxin vs esmolol gtt for persistent RVR in setting of hypotension.    Replace K and Mg to goal    Cont coumadin w goal INR 2-3.     Further recommendations from cardiology appreciated.    Acute on chronic CHF with biventricular systolic dysfunction:  Diagnosed w tachycardia-induced cardiomyopathy in 4/2015 during admission for rapid a-fib, at which time EF 15-20%. Repeat echo 1/9 showed EF 50-55% with evidence of RV pressure overload, RV dilatation, and moderately reduced RV systolic function. Baseline -400. BNP on admission up to 6471 without overt findings of fluid overload. No peripheral edema or JVD on exam. ? BNP elevated secondary to rapid a-fib. Patient declined diuretics on admission. Current BP soft, precluding aggressive diuresis.     Repeat echo pending.    Hold PTA spironolactone d/t low BP    Consider IV lasix pending CT results    Idiopathic pulmonary fibrosis:  Mild restrictive lung disease w normal PFT's per pulmonary note (1/5/17). Currently on transplant list.     Pulmonary consulted. CT chest w/o contrast pending. ? Need for steroids given worsening hypoxia.    Cont pirfenidone 801mg TID w meals    Further per pulmonary team    Troponin leak:  Suspect demand ischemia in setting of rapid a-fib and possible CHF. No new ischemic changes on EKG. Normal coronary angiogram in 4/2015. See troponin trend below. Low suspicion for ACS.     Further per cardiology.     Transient R hemianopsia:  Patient reported R visual field  deficit in ED c/w homonymous hemianopsia. Symptoms resolved spontaneously after 5 min duration. Neurology consulted. TIA suspected. CTA head/neck recommended however patient has refused. She suspects symptoms were d/t adverse reaction to vancomycin. Currently no neurologic complaints or focal deficits on exam. INR therapeutic on coumadin     Neurology consulted, further recommendations appreciated.    CTA head/neck ordered, however patient continues to refuse.    Cont coumadin per pharmacy dosing    Possible Red Man Syndrome:  Started on IV vancomycin for possible pneumonia. Developed facial flushing, pruritis and ? Erythema which resolved shortly after stopping abx. Very low index of suspicion for infection, therefore will hold further dosing of vancomycin.            Consulting Services: Cardiology. Neurology. Pulmonary.     CODE: Full.  DVT:  Coumadin.   Diet/fluids: NPO.   Disposition: Anticipate d/c home; timing unclear.       Patient's care was discussed with bedside RN, patient, and care coordinator.    Today's plan of care was reviewed with attending physician, Dr. Donahue.     Juanito Hall PA-C  Internal Medicine [facility]  Pager [phone]     Team: Medicine Gold 1  Page Cross Cover after 5 pm on pager [phone].      Patient seen with the [facility] today. Agree with plan as outlined with the following additions/exceptions    Vitals, imaging and labs reviewed for today.  Cresencio Donahue MD  [facility] Medicine Attending  [phone]   --------------------------------------------------------------------------------------------------------------------------------    S:  Continues to report severe dyspnea on exertion, mild orthopnea overnight, and fatigue. Occasional episodes of coughing but no sputum production. No fevers or chills. No chest pain. Denies any neurologic deficits, specifically no recurrence of visual field deficit.      ROS: Resp, CV, GI and  performed and negative unless  "otherwise noted in subjective.     Medications: Reviewed in EPIC.    O:    Blood pressure 93/82, pulse 70, temperature 98.1  F (36.7  C), temperature source Oral, resp. rate 20, height 1.753 m (5' 9\"), SpO2 98 %.    GENERAL: Alert and oriented x 3. NAD. O2 via NC at 10 L.   HEENT: Anicteric sclera. Mucous membranes moist.   CV: Irregularly irregular. S1, S2. No murmurs appreciated. No JVD.  RESPIRATORY:  Good aeration throughout. Dry bibasilar rales. No wheezing.   GI: Abdomen soft and non distended. Active bowel sounds. No tenderness, rebound, guarding.   NEUROLOGICAL: No focal deficits. Moves all extremities.    EXTREMITIES: No pitting edema. Intact bilateral pedal pulses.   SKIN: No jaundice. No rashes.     Lines/Tubes/Drains:   Peripheral IV 02/25/17 Left Upper forearm (Active)   Site Assessment WDL 2/26/2017  2:49 AM   Line Status Infusing 2/26/2017  2:49 AM   Phlebitis Scale 0-->no symptoms 2/26/2017  2:49 AM   Infiltration Scale 0 2/26/2017  2:49 AM   Extravasation? No 2/26/2017  2:49 AM   Number of days:1       Labs & Studies of Note: I personally reviewed the following studies:    ROUTINE IP LABS (Last four results)  CMP   Recent Labs  Lab 02/26/17  0523 02/25/17  1740    140   POTASSIUM 3.8 4.1   CHLORIDE 104 106   CO2 28 25   ANIONGAP 6 10   GLC 94 97   BUN 9 9   CR 1.04 0.95   ANJALI 8.0* 8.6   PROTTOTAL  --  7.6   ALBUMIN  --  3.1*   BILITOTAL  --  0.3   ALKPHOS  --  63   AST  --  17   ALT  --  16     CBC   Recent Labs  Lab 02/26/17  0523 02/25/17  1740   WBC 9.2 8.6   RBC 3.85 4.22   HGB 11.8 13.0   HCT 36.5 39.7   MCV 95 94   MCH 30.6 30.8   MCHC 32.3 32.7   RDW 13.7 13.5    243     INR   Recent Labs  Lab 02/26/17  0523 02/25/17  1740 02/22/17  1040   INR 2.76* 2.56* 2.60*       Recent Labs  Lab 02/26/17  0135 02/25/17  1740   TROPI 0.123* 0.117*        All labs personally reviewed in Epic.  See HPI for more pertinent results.     Unresulted Labs Ordered in the Past 30 Days of this Admission "     Date and Time Order Name Status Description    2/26/2017 0357 Blood culture In process     2/25/2017 1645 Blood culture Preliminary     2/25/2017 1645 Blood culture Preliminary

## 2017-02-26 NOTE — PROGRESS NOTES
X cover note    Neurology called around 1:30a about concerning symptoms that could be attributed to posterior circulation CVA.   They strongly recommend obtaining CT angio of head and neck (already ordered) to assess posterior circulation. The patient reportedly declined CT attributing her symptoms to vancomycin side effect.  Per neurology, if she is agreeable to CT angio, no need for carotid doppler. We will continue to address the indication of CT angio with her.    3a: patient arrived on the floor. Denied SOB, sat 99% on 7L (though pt requested not to titrate this down and use 8-10L NC). She is able to speak in full sentences. -120 afib.  - re-discussed the indication of CT angio with the patient including why I am not convinced that it was an allergic reaction to vancomycin. She is still not agreeable to CT angio. Her hemianopsia has completely resolved since.   - patient requested humidifier to be used with 10L NC oxygen. Discussed with RT: hospital policy is not to use more than 6L via NC with or without humidifier. Patient states her pulmonologist instructed her to always use humidifier with 8-10L O2. she has been using 10L NC and when using humidifier, only filling the bottle half way up to prevent water from getting into the tubing. Explained efficacy of O2 delivery and concern using humidifier with high flow O2 via NC. After discussion, the water in the humidifier bottle was decreased to less than half of original amount and continue O2 via nasal canula (her regimen at home). Risks were explained and she acknowledged them. Sat 97-99% on 6L NC, but she requested that the flow be increased to 8-10L regardless.  - discussed about lasix 20mg iv: she states that cardiologist in the ED told her to wait till the morning. At this point, she is not in acute distress. She agreed that if her symptoms changed or her sat decreased to receive IV lasix without waiting for the morning.  - metoprolol 5mg iv q4h prn  ordered for HR>120 per card rec.    Will keep addressing concerns and explain interventions.

## 2017-02-26 NOTE — ED NOTES
Pharmacist returned from pt's room and reported to RN that patient was having visual changes. RN and MD at bedside to do assessment.

## 2017-02-26 NOTE — CONSULTS
[facility]  Pulmonary Consult Initial Note  February 26, 2017      Sophia Johnson MRN# [id number]   Age: 71 year old YOB: 1945     Date of Admission: 2/25/2017  Reason for Consultation: Worsening hypoxia in a patient with IPF, on lung transplant list.   Requesting Physician: Gold Team    Primary care provider: Lesley Christianson     Assessment and Plan:  Ms. Sophia Johnson is a 71-year old female with IPF with chronic hypoxic respiratory failure (follows with Dr. Bowman, on pirfenidone) on the lung transplant list, biventricular dysfunction, atrial fibrillation on warfarin, pulmonary hypertension from lung disease, who presented with progressive dyspnea, cough, and hypoxia.     Progressive chronic hypoxia, now requiring 10L NC from 8L NC one month ago. I suspect atrial fibrillation with RVR has tipped her over the edge and also possibly has acute CHF exacerbation with pulmonary edema (BNP >6000), as well. Lower suspicion for infectious pneumonia in the absence of fevers/leucocytosis. Negative Strep, Legionella, and influenza A/B, though RVP panel is also in process. IPF progression +/- exacerbation are also possible; pending CT chest.     RECOMMENDATIONS:  1. Follow-up CT chest.  2. Follow-up sputum cultures and RVP.  3. Agree with rate control/cardioversion per Cardiology.  4. Agree with diuresis. Please try obtaining BP manually as I suspect the automatic BP measurements could be erroneous given arrhythmia.   5. Lung Transplant team is aware that patient is currently hospitalized.     We will continue to follow with you. Please do not hesitate to contact us with questions.     Staffed with Dr. Colton Anna.     Roseann Hammond MD PGY-5  Pulmonary & Critical Care Fellow  Pager [phone]            Chief Complaint:     History obtained from patient and chart review.    History of Present Illness: Ms. Sophia Johnson is a 71-year old female with IPF with chronic hypoxic  respiratory failure (follows with Dr. Bowman, on pirfenidone) on the lung transplant list, biventricular dysfunction, atrial fibrillation on warfarin, pulmonary hypertension from lung disease, who presented with progressive dyspnea, cough, and hypoxia.     Patient reports needing increasing oxygen from 8L to 10L over the last month. She feels that her breathing has also progressive gotten worse. She also reports increased cough, mostly dry over the last 3 weeks, but also has noted tan sputum with slight specks of red blood this last week. She also has increasing orthopnea and PND, though weight has been stable. She has noted that her pulse oximetry at home indicated that she has increased heart rates, which was similar to what she had in the past. Since Thursday, she reports being on atrial fibrillation with rapid rate (110-120s). Cardiology plans to perform cardioversion in the morning, same procedure she had approximately 2 years ago.     Otherwise, she denies fevers, chills, sweats, and sick contacts. No travels. She stays at home most of the time.                              Past Medical History:     Past Medical History   Diagnosis Date     Atrial fibrillation (H) 4/7/2015     Atypical squamous cell changes of undetermined significance (ASCUS) on cervical cytology with positive high risk human papilloma virus (HPV) age 32 & following     Chronic systolic heart failure (H)      Coughing      Fall against object 2/2005     Fell 35 feet     IPF (idiopathic pulmonary fibrosis) (H)      chest CT 2010 showed pulm fibrosis but not diagnostic of IPF; VATS R lung bx 5/2013 +UIP; FAROOQ simutuzumab study until 4-2015; pirfenidone started .     LBP (low back pain)      On home oxygen therapy      uses when walking >3 minutes     Pneumonia      interstitial lung disease      Vitamin D deficiency                 Past Surgical History:     Past Surgical History   Procedure Laterality Date     Arthroscopy knee rt/lt        Right     Surgical history of -        left foot surgery, tendon had , cadaver bone     Laminect/discectomy, lumbar  Oct.      kyphoplasty, vertebroplasty     Right wrist surgery       Colposcopy cervix, biopsy cervix, endocervical curettage, combined  age?     Reported Benign      Foot surgery       L foot tendon     Thoracoscopic wedge resection lung  2013     Procedure: THORACOSCOPIC WEDGE RESECTION LUNG;  Right Thoracscopic Wedge Resection Anesthesia General with block;  Surgeon: José Peralta MD;  Location: [facility] OR     Anesthesia cardioversion N/A 6/3/2015     Procedure: ANESTHESIA CARDIOVERSION;  Surgeon: GENERIC ANESTHESIA PROVIDER;  Location:  OR      esoph/gas reflux test w nasal imped >1 hr N/A 3/17/2016     Procedure: ESOPHAGEAL IMPEDENCE FUNCTION TEST WITH 24 HOUR PH GREATER THAN 1 HOUR;  Surgeon: Gonzalo Reveles MD;  Location:  GI     Transplant              Social History:     Social History     Social History     Marital status:      Spouse name: N/A     Number of children: 2     Years of education: N/A     Occupational History      [employer] [facility] [place]     works in the laboratory     Social History Main Topics     Smoking status: Passive Smoke Exposure - Never Smoker     Years: 2.00     Types: Cigarettes     Smokeless tobacco: Never Used      Comment: Smoked in college, a cigarette here and there     Alcohol use No      Comment: 3 drinks a week     Drug use: No     Sexual activity: Yes     Partners: Male     Other Topics Concern     Parent/Sibling W/ Cabg, Mi Or Angioplasty Before 65f 55m? No     Social History Narrative     and  at [employer] [facility] [place]            Family History:     Family History   Problem Relation Age of Onset     Breast Cancer Mother      CANCER Mother      Breast Cancer     CANCER Father      bone and liver     CEREBROVASCULAR DISEASE Maternal Grandfather           Allergies:      Allergies   Allergen Reactions      Vicodin [Hydrocodone-Acetaminophen]      Headache              Medications:       senna-docusate  1-2 tablet Oral BID     warfarin  5 mg Oral ONCE at 18:00     metoprolol  25 mg Oral Daily     omeprazole  20 mg Oral Daily     pirfenidone  801 mg Oral TID w/meals     iopamidol  75 mL Intravenous Once     diphenhydrAMINE  50 mg Intravenous Once     furosemide  20 mg Intravenous Once     ranitidine (ZANTAC) IV  50 mg Intravenous Once     naloxone, - MEDICATION INSTRUCTIONS -, bisacodyl, ondansetron **OR** ondansetron, metoprolol, potassium chloride, potassium chloride, potassium chloride, potassium chloride with lidocaine, potassium chloride, magnesium sulfate, magnesium sulfate, guaiFENesin-codeine, Warfarin Therapy Reminder         Review of Systems:   10-systems reviewed with pertinent positives and negatives mentioned in the HPI.           Physical Exam:   Temp:  [97.2  F (36.2  C)-98.3  F (36.8  C)] 98.3  F (36.8  C)  Pulse:  [70] 70  Heart Rate:  [109-137] 121  Resp:  [16-32] 20  BP: ()/(48-93) 98/74  SpO2:  [92 %-99 %] 98 %    Intake/Output Summary (Last 24 hours) at 02/26/17 1441  Last data filed at 02/26/17 0822   Gross per 24 hour   Intake                0 ml   Output              450 ml   Net             -450 ml     Gen:   No acute distress. Alert, awake, and oriented.     HEENT:   Anicteric sclerae. No oral lesions     Lungs:   Crackles throughout, most notably in the bases. No wheezing or rhonchi.       Cardiovascular:   Regularly irregular. Normal S1 and S2.  No murmur, gallop or rub.     Abdomen:   Soft, ND, NT with active BS.      Extremities:    Trace bilateral lower extremity edema.       Neurologic:   Alert and conversant.      Skin:   Warm, dry.  No rash on exposed skin.           Data:   All laboratory and imaging data reviewed.       Sputum Cultures in the last 3 months:  Specimen Description   Date Value Ref Range Status   02/26/2017 Midstream Urine  Final   02/26/2017 Midstream Urine   Final   02/25/2017 Blood Right Hand  Final    Culture Micro   Date Value Ref Range Status   02/25/2017 No growth after 10 hours  Final   02/25/2017 No growth after 7 hours  Final   10/02/2015 >100,000 colonies/mL Escherichia coli (A)  Final           CT chest: Pending.

## 2017-02-26 NOTE — PROGRESS NOTES
[facility]   History & Physical    Sophia Johnson MRN# [id number]   Age: 71 year old YOB: 1945     Date of Admission: 2/25/2017  Service: Gold Night    Primary Care Provider: Lesley Christianson         Assessment and Plan:   Sophia Johnson is a 71 year old female with PMH of IPF who is currently transplanted (10L O2 at [facility]), A.fib (on Warfarin), systolic HF, pulmonary hypertension, and PFO who presents to the ED with complaints of increasing dyspnea on exertion, chest pressure and cough.       A/C hypoxic respiratory failure in patient with known IPF: currently transplant listed.  Worsening sx of garrison, PND, and fatigue over the past 2 weeks. More recently, since 2/21, patient with severe coughing spell, chest pressure, tachycardia, and small amount of green sputum production. No fevers, rhinorrhea, sore throat, sick contacts. ED eval:  EKG with A.fib with RVR and and T wave inversion III, AVF, V3-V6 (similar to most recent EKG 5/2016). CXR with new hazy opacity in left base- atelectasis vs consolidation and noted chronc bilateral interstitial fibrotic changes. NT-BNP elevated 6471, Troponin elevated at 0.117 (elevated to 3.0-4.0 rage in past ), Procalcitonin negative at < 0.05, LA 1.3, VBG 7.42/42/43/27, Negative Influenza Ag, WBC 8.6, Afebrile, no tachycardia. BP soft,but stable. Received Azithromycin w/o improvement in sx. Prescribed course of Augmentin (2/24/17) and has taken one dose. Initiated on Vanc, Zosyn, and Levaquin in ED for suspected PNA, though given negative procalcitonin, LA normal, afebrile and w/o leukocytosis- strong suspicion that current sx are attributed to heart failure vs disease progression and less likely infections, though cannot r/o given new hazy opacity in left base.   - Continue Vanc and Zosyn IV antibiotics tonight. No Levaquin use with Esbriet  - Serial Troponin q6h x 2  - Sputum Culture  - Strep and Legionella Urine Ag  -  Check Respiratory viral panel  - O2 to maintain sats > 90%  - Pulmonology consult- placed  - Cardiology consult- placed  - Cardiac tele  - Continuous pulse Ox  - Codeine Cough syrup PRN  - Admit to 6B    Acute Transient Right homonymous hemianopsia: Per Triage MD (who was present in ED when sx began), patient with reports of complete loss of vision on the right side for 5 minutes. Neurology consulted. No TPA given d/t quickly resolving sx. DDX at this time is TIA. Patient anticoagulated, though with multiple CVA RF.  - Neurology consult- appreciate recommendations  - Q4h Neuro checks  - Tele  - CT head neck Angio w/o and w/contrast  - Bilateral Carotid US  - Bilateral LE duplex with dopplers as patient with hx of PFO  - Further recommendations per Neurology   - Patient signed out to Night MD who will follow  - Contacted by RN who states patient is refusing CT head. ED MD to discuss with patient.   - Patient signed out to Night Triage who will follow    Paroxysmal A.fib: [id number]: 3. S/p cardioversion (6/2015). On coumadin. INR on admission therapeutic at 2.56. EKG with A.fib RVR and T wave inversions similar to previous. Patient states that she discussed her current sx with Cardiology 2/24/17 and was to f/u as o/p, though sx have worsened prompting ED eval. Per patient, Cardioversion was discussed as patient has been in NSR since cardioversion.   - Cardiology consult. Discussed with below. No current indication for urgent cardioversion.   - POC ECHO pending from ED  - Cardiac tele  - Continue PTA Metoprolol 25 mg PO qam  - Will hold off on amiodarone given pulmonary toxicity. 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 EF 50-55%. [facility], with recent clinic visit 1/09/17- Dr. Martin, NYHA class IIIB. PTA lisinopril, metoprolol, aldactone. No reports of weight gain. Does endorse  progressive GARRISON- now with severe limitation, PND, and increasing O2 needs. NT-BNP elevated to 6471 (400-1/9/17). Appears euvolemic.   - Discussed with Cardiology with recommendations for gentle diuresis- Will give 20 mg of lasix x 1  - ECHO in am  - Cardiology consult- appreciate recs    Suspected Red ManSyndrome: Contacted by RN regarding patient with complaints of facial flushing and pruritis ~ 30 minutes into infusion. No hypertension, lip or tongue swelling, or reported muscle aches.    - Plan to decrease infusion rate  - Give 50 mg IV benadryl x 1  - Give 50 mg IV ranitidine x 1  - Continue to closely monitor      Mild Pulmonary Hypertension: 2/2 pulmonary disease. Recent RHC (1/11/17) revealed mild progression of her pulmonary HTN (PA pressure 30) . Per recent Cardiology note (1/9/17) noted the patient with PFO and she may have ritght->left shunting d/t Pulmonary HTN, though closing this may be detrimental to her RV function.   - Cardiology consult       Discussed with Dr. Anguiano.     FEN: No IVF, Low Na diet  Prophylaxis: Mechanical, SCD's. Chronic anticoagulation on warfarin   Code Status: Full Code    Angie Romero PA-C  Hospitalist Medicine  Pager: [phone]         Chief Complaint:   GARRISON, chest pressure         History of Present Illness:   Sophia Johnson is a 71 year old female with complex pulmonary and cardiac history who presented to the ED with complaints of progressive garrison, productive cough, PND, and increasing O2 needs .    Patient states that over the past 2 months she has been experiencing increase in O2 needs (8l --> 10L) and garrison. She has completed a course of azithromycin w/o improvement in sx. Patient reports that 2/21/17 she experienced a severe coughing spell with associated chest pressure, slight green sputum production, and worsening of garrison and PND (4 pillow). She has previously experienced the sx of chest pressure with A.fib (4/2015). No pain radiating down arm, or CP noted- just  pressure. SAUNDERS is reported as severely limiting and she is unable to climb stairs or walk to the bathroom ~ 10 feet without sats dropping to 70%. Patient discussed her worsening sx with Cardiology (2/24/17) with plan course of Augmentin and o/p f/u on 2/27/17 if sx were stable. Sx are reported as intolerable prompting ED evaluation.     Patient does not endorse: headaches, chest pain, palpitations, upper respiratory symptoms of rhinorrhea or congestion, wheezing, abdominal pain, nausea, emesis, constipation, diarrhea, dysuria, edema, rashes, weakness, focal neurologic deficits, recent travel, fever, chills.    In ED patient with noted soft, though stable BP's. Sats 96% on 10L O2, RR 20's. Able to speak in full sentences and w/o accessory muscle use. No coughing noted on exam. Asking to eat.  at bedside. Reports ongoing chest pressure and saunders.      Updated by Triage MD that patient experienced a Right vision loss x 5 minutes. Neurology was consulted. Triage MD evaluated patient and will defer to Neurology recs.     Updated by ED RN that patient flushed and inching after Vancomycin was infusing. Also noted that patient with complaints of  Bilateral LE swelling, and refusing the Head CT recommended by Neurology.             Review of Systems:   A 10 point review of systems was performed and is negative unless otherwise noted in HPI.          Past Medical History:     Past Medical History   Diagnosis Date     Atrial fibrillation (H) 4/7/2015     Atypical squamous cell changes of undetermined significance (ASCUS) on cervical cytology with positive high risk human papilloma virus (HPV) age 30 & following     Chronic systolic heart failure (H)      Coughing      Fall against object 2/2005     Fell 35 feet     IPF (idiopathic pulmonary fibrosis) (H)      chest CT 2010 showed pulm fibrosis but not diagnostic of IPF; VATS R lung bx 5/2013 +UIP; RAINIER simutuzumab study until 4-2015; pirfenidone started .     LBP  (low back pain)      On home oxygen therapy      uses when walking >3 minutes     Pneumonia      interstitial lung disease      Vitamin D deficiency              Past Surgical History:      Past Surgical History   Procedure Laterality Date     Arthroscopy knee rt/lt       Right     Surgical history of -        left foot surgery, tendon had , cadaver bone     Laminect/discectomy, lumbar  Oct. 2010     kyphoplasty, vertebroplasty     Right wrist surgery       Colposcopy cervix, biopsy cervix, endocervical curettage, combined  age?     Reported Benign      Foot surgery       L foot tendon     Thoracoscopic wedge resection lung  2013     Procedure: THORACOSCOPIC WEDGE RESECTION LUNG;  Right Thoracscopic Wedge Resection Anesthesia General with block;  Surgeon: José Peralta MD;  Location: [facility] OR     Anesthesia cardioversion N/A 6/3/2015     Procedure: ANESTHESIA CARDIOVERSION;  Surgeon: GENERIC ANESTHESIA PROVIDER;  Location: [facility] OR      esoph/gas reflux test w nasal imped >1 hr N/A 3/17/2016     Procedure: ESOPHAGEAL IMPEDENCE FUNCTION TEST WITH 24 HOUR PH GREATER THAN 1 HOUR;  Surgeon: Gonzalo Reveles MD;  Location: [facility] GI     Transplant               Family History:     Family History   Problem Relation Age of Onset     Breast Cancer Mother      CANCER Mother      Breast Cancer     CANCER Father      bone and liver     CEREBROVASCULAR DISEASE Maternal Grandfather              Social History:     Social History   Substance Use Topics     Smoking status: Passive Smoke Exposure - Never Smoker     Years: 2.00     Types: Cigarettes     Smokeless tobacco: Never Used      Comment: Smoked in college, a cigarette here and there     Alcohol use No      Comment: 3 drinks a week             Medications:     No current facility-administered medications on file prior to encounter.   Current Outpatient Prescriptions on File Prior to Encounter:  guaiFENesin-codeine (CHERATUSSIN AC) 100-10 MG/5ML SOLN  solution Take 5-10 mLs by mouth every 4 hours as needed for cough   amoxicillin-clavulanate (AUGMENTIN) 875-125 MG per tablet Take 1 tablet by mouth 2 times daily   azithromycin (ZITHROMAX) 250 MG tablet Take 2 tablets (500 mg) the first day, then 1 tablet daily.   metoprolol (TOPROL-XL) 25 MG 24 hr tablet TAKE ONE TABLET BY MOUTH ONE TIME DAILY    pirfenidone (ESBRIET) 267 MG capsule Take 3 capsules (801 mg) by mouth 3 times daily (with meals)   warfarin (COUMADIN) 5 MG tablet Take 5 mg daily or as instructed by coumadin clinic nurse   omeprazole (PRILOSEC) 20 MG capsule Take 1 capsule (20 mg) by mouth daily   traMADol (ULTRAM) 50 MG tablet Take 1-2 tabs three times daily for pain as needed   SUNSCREEN SPF50 LOTN Apply as needed.   lisinopril (PRINIVIL,ZESTRIL) 2.5 MG tablet Take 1 tablet (2.5 mg) by mouth daily   alendronate (FOSAMAX) 70 MG tablet Take 1 tablet (70 mg) by mouth every 7 days   spironolactone (ALDACTONE) 25 MG tablet TAKE ONE TABLET BY MOUTH EVERY DAY   Ascorbic Acid (VITAMIN C PO)    omeprazole (PRILOSEC) 20 MG capsule Take 1 capsule (20 mg) by mouth daily   order for DME Patient currently gets oxygen [facility].  Please provide new liquid system to meet increased need.  Patient requires 8 liters oxygen via nasal cannula with oximyzer to maintain oxygen saturation above 88% with activity.  This is for lifetime use.   warfarin (COUMADIN) 7.5 MG tablet Take 1 tablet (7.5 mg) by mouth daily   order for DME Please provide patient with POC (portable oxygen concentrator).  Patient currently uses 2-3 LPM continuous flow oxygen.   torsemide (DEMADEX) 10 MG tablet Take 1 tablet (10 mg) by mouth daily as needed   ORDER FOR DME Please provide patient with second liquid concentrator for filling smaller tanks.  Patient goes through more oxygen since recent hospitalization and needs a second unit at home.   cholecalciferol (VITAMIN D3) 1000 UNIT tablet Take 2,000 Units by mouth Once daily            Allergies:  "    Allergies   Allergen Reactions     Vicodin [Hydrocodone-Acetaminophen]      Headache               Physical Exam:   /76  Pulse 70  Temp 97.2  F (36.2  C)  Resp (!) 32  Ht 1.753 m (5' 9\")  SpO2 94%   GENERAL: Pleasant female sitting up in bed. Propped up on pillow. Alert and oriented x 3.  at bedside. Tearful at times. Speaking in complete sentences, No accessory muscle use.    HEENT: Anicteric sclera. PERRL. Mucous membranes moist and without lesions.   CV: Irregularly, irregular. No murmurs appreciated.   RESPIRATORY: Effort normal on 10L via NC. Lungs with diffuse crackles.   GI: Abdomen soft and non distended with normoactive bowel sounds present in all quadrants. No tenderness, rebound, guarding.   MUSCULOSKELETAL: No joint swelling or tenderness. Moves all extremities.   NEUROLOGICAL: No focal deficits. Strength 5/5 bilaterally in upper and lower extremities.   EXTREMITIES: Trace peripheral edema. Intact bilateral pedal pulses.   SKIN: No jaundice. No rashes to exposed skin areas.          Labs:   CBC:  Recent Labs   Lab Test  02/25/17   1740   WBC  8.6   RBC  4.22   HGB  13.0   HCT  39.7   MCV  94   MCH  30.8   MCHC  32.7   RDW  13.5   PLT  243       CMP:  Recent Labs   Lab Test  02/25/17   1740   NA  140   POTASSIUM  4.1   CHLORIDE  106   ANJALI  8.6   CO2  25   BUN  9   CR  0.95   GLC  97   AST  17   ALT  16   BILITOTAL  0.3   ALBUMIN  3.1*   PROTTOTAL  7.6   ALKPHOS  63       INR:   Recent Labs   Lab Test  02/25/17   1740   INR  2.56*            Imaging:   Exam: XR CHEST 2 VW, 2/25/2017 7:17 PM     Indication: sob     Comparison: 3/14/2016, 4/1/2015     Findings:   AP and lateral views of the chest were obtained. Decreased lung  volumes. The cardiac silhouette is at the upper limits of normal. No  pneumothorax or pleural effusion. Redemonstration of basilar  predominant interstitial fibrotic changes. New left basilar hazy  airspace opacity. Visualized upper abdomen is " unremarkable.         Impression:   1. New hazy opacity in the left base may represent atelectasis and/or  consolidation.  2. Chronic bilateral interstitial fibrotic changes.

## 2017-02-26 NOTE — CONSULTS
[facility], [place]      Neurology Stroke Consult    Patient Name: Sophia Johnson  : 1945 MRN#: [id number]    STROKE DATA    Stroke Code:  Stroke code not indicated.  Time patient seen:  2017  Last known normal (pt's baseline):  2017    TPA treatment:  Not given due to minor / isolated / quickly resolving stroke symptoms.     National Institutes of Health Stroke Scale (at presentation)  NIHSS done at:  time patient seen      Score    Level of consciousness:  (0)   Alert, keenly responsive     LOC questions:  (0)   Answers both questions correctly    LOC commands:  (0)   Performs both tasks correctly    Best gaze:  (0)   Normal    Visual:  (0)   No visual loss    Facial palsy:  (0)   Normal symmetrical movements    Motor arm (left):  (0)   No drift    Motor arm (right):  (0)   No drift    Motor leg (left):  (0)   No drift    Motor leg (right):  (0)   No drift    Limb ataxia:  (0)   Absent    Sensory:  (0)   Normal- no sensory loss    Best language:  (0)   Normal- no aphasia    Dysarthria:  (0)   Normal    Extinction and inattention:  (0)   No abnormality        NIHSS Total Score:  0        Dysphagia Screen  Time of screenin2017  Screening results: Passed screening, no dysarthria - Regular Diet with thin liquids     ASSESSMENT & RECOMMENDATIONS   Ms Johnson was seen for evaluation of sudden onset transient R terrence visual field cut. Her symptoms resolved within 5 minutes. Given her multiple cerebrovascular risk factors, TIA must be considered. Her ABCD score is 1, which indicates low short-term risk of stroke, however, given her suspicious symptoms in the setting of therapeutic anticoagulation, we recommend complete TIA workup. The differential diagnosis includes TIA.     Impression: Transient ischemic attack    Recommendations:  Transient Ischemic Attack Plan  - CT/CTA head/neck tonight - Carotid doppler would be redundant if CTA is completed -  "Additionally, the posterior circulation is of interest given her visual symptoms and is not visualized with carotid doppler  - ABCD2 Score: 1 (age>60)  - Neurochecks q4h  - Euthermia, Euglycemia  - Statin if indicated by lipid profile  - MRI brain without contrast (order \"[id number]\") tomorrow  - TTE with Bubble Study  - 24-hour Telemetry  - Bedside Glucose Monitoring  - A1c, Lipid Panel    Prophylaxis          For VTE Prevention:  - per primary team     The patient will be managed by the admitting team and  followed by the Stroke Consult service.    HPI  Sophia Johnson is a 71 year old woman with known history of idiopathic pulmonary fibrosis, atrial fibrillation (on warfarin), HTN, CHF, and patent foramen ovale who is currently being admitted for evaluation and management of dyspnea. While in the ED, she experienced a brief episode of R hemifield vision loss. She states that the R half of her vision was blacked out. This persisted regardless of which eye she looked through. She denies speech/language deficits, focal weakness, sensory changes, and facial droop. Her  is present at bedside and confirms her history and denies confusion and word-finding deficits while she was symptomatic. Her symptoms lasted approximately 5 minutes before resolving. She has never had symptoms like this before. She denies prior history of TIAs or seizures.     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     LBP (low back pain)      On home oxygen therapy      uses when walking >3 minutes     Pneumonia      interstitial lung disease      Vitamin D deficiency        Past Surgical History   Procedure Laterality Date     Arthroscopy knee rt/lt       Right     Surgical history of -        left foot surgery, tendon had , cadaver bone     Laminect/discectomy, lumbar  Oct. 2010     kyphoplasty, vertebroplasty     Right wrist surgery       Colposcopy cervix, biopsy cervix, endocervical curettage, combined  age?     Reported Benign      Foot surgery       L foot tendon     Thoracoscopic wedge resection lung  2013     Procedure: THORACOSCOPIC WEDGE RESECTION LUNG;  Right Thoracscopic Wedge Resection Anesthesia General with block;  Surgeon: José Peralta MD;  Location: [facility] OR     Anesthesia cardioversion N/A 6/3/2015     Procedure: ANESTHESIA CARDIOVERSION;  Surgeon: GENERIC ANESTHESIA PROVIDER;  Location: [facility] OR      esoph/gas reflux test w nasal imped >1 hr N/A 3/17/2016     Procedure: ESOPHAGEAL IMPEDENCE FUNCTION TEST WITH 24 HOUR PH GREATER THAN 1 HOUR;  Surgeon: Gonzalo Reveles MD;  [facility]     Transplant         Medications:   Current Facility-Administered Medications   Medication     guaiFENesin-codeine (ROBITUSSIN AC) 100-10 MG/5ML solution 5-10 mL     metoprolol (TOPROL-XL) 24 hr tablet 25 mg     omeprazole (priLOSEC) CR capsule 20 mg     pirfenidone (ESBRIET) capsule 801 mg     spironolactone (ALDACTONE) tablet 25 mg     Warfarin Therapy Reminder (Check START DATE - warfarin may be starting in the FUTURE)     iopamidol (ISOVUE-370) solution 75 mL     sodium chloride (PF) 0.9% PF flush 90 mL     diphenhydrAMINE (BENADRYL) injection 50 mg     furosemide (LASIX) injection 20 mg     ranitidine (ZANTAC) injection 50 mg     Current Outpatient Prescriptions   Medication Sig     calcium-vitamin D (CALTRATE) 600-400 MG-UNIT per tablet Take 1 tablet by mouth daily      amoxicillin-clavulanate (AUGMENTIN) 875-125 MG per tablet Take 1 tablet by mouth 2 times daily     metoprolol (TOPROL-XL) 25 MG 24 hr tablet TAKE ONE TABLET BY MOUTH ONE TIME DAILY      pirfenidone (ESBRIET) 267 MG capsule Take 3 capsules (801 mg) by mouth 3 times daily (with meals)     SUNSCREEN SPF50 LOTN Apply as needed.     lisinopril (PRINIVIL,ZESTRIL) 2.5 MG tablet Take 1 tablet (2.5 mg) by mouth daily     spironolactone (ALDACTONE) 25 MG tablet TAKE ONE TABLET BY MOUTH EVERY DAY     Ascorbic Acid (VITAMIN C PO)      omeprazole (PRILOSEC) 20 MG capsule Take 1 capsule (20 mg) by mouth daily     order for DME Patient currently gets oxygen form [facility] [place].  Please provide new liquid system to meet increased need.  Patient requires 8 liters oxygen via nasal cannula with oximyzer to maintain oxygen saturation above 88% with activity.  This is for lifetime use.     warfarin (COUMADIN) 7.5 MG tablet Take 1 tablet (7.5 mg) by mouth daily     order for DME Please provide patient with POC (portable oxygen concentrator).  Patient currently uses 2-3 LPM continuous flow oxygen.     ORDER FOR DME Please provide patient with second liquid concentrator for filling smaller tanks.  Patient goes through more oxygen since recent hospitalization and needs a second unit at home.     guaiFENesin-codeine (CHERATUSSIN AC) 100-10 MG/5ML SOLN solution Take 5-10 mLs by mouth every 4 hours as needed for cough     warfarin (COUMADIN) 5 MG tablet Take 5 mg daily or as instructed by coumadin clinic nurse     alendronate (FOSAMAX) 70 MG tablet Take 1 tablet (70 mg) by mouth every 7 days     torsemide (DEMADEX) 10 MG tablet Take 1 tablet (10 mg) by mouth daily as needed   .    Allergies:   Allergies   Allergen Reactions     Vicodin [Hydrocodone-Acetaminophen]      Headache     .    Family History:   Family History   Problem Relation Age of Onset     Breast Cancer Mother      CANCER Mother      Breast Cancer     CANCER Father      bone and liver      CEREBROVASCULAR DISEASE Maternal Grandfather    .    Social History:   Social History   Substance Use Topics     Smoking status: Passive Smoke Exposure - Never Smoker     Years: 2.00     Types: Cigarettes     Smokeless tobacco: Never Used      Comment: Smoked in college, a cigarette here and there     Alcohol use No      Comment: 3 drinks a week   .    Tobacco use: Never    ROS:  The 10 point Review of Systems is negative other than noted in the HPI or here.    PHYSICAL EXAMINATION  Vital Signs:  B/P: 111/85,  T: 97.2,  P: 70,  R: 19    General:  patient lying in bed without any acute distress    HEENT:  normocephalic/atraumatic  Cardio:  RRR  Pulmonary:  crackles heard  Abdomen:  soft, non-tender, non-distended, bowel sounds present  Extremities:  no edema  Skin:  intact, warm/dry     Neurologic  Mental Status:  fully alert, attentive and oriented, follows commands, speech clear and fluent  Cranial Nerves:  visual fields intact, PERRL, EOMI with normal smooth pursuit, facial sensation intact and symmetric, facial movements symmetric, hearing not formally tested but intact to conversation, palate elevation symmetric and uvula midline, no dysarthria, tongue protrusion midline  Motor:  no abnormal movements, normal tone throughout, normal muscle bulk, no pronator drift, able to move all limbs spontaneously, strength 5/5 throughout upper and lower extremities  Reflexes:  2+ and symmetric throughout, no clonus, toes downgoing  Sensory:  intact to light touch throughout  Coordination:  FNF and HS intact without dysmetria  Station/Gait:  not assessed    Labs  Labs and Imaging reviewed and used in developing the plan; pertinent results included.     Lab Results   Component Value Date    GLC 97 02/25/2017     The patient was discussed with the Fellow, Dr. Elizalde.  The staff is Dr. Baca.    Roel Bliss DO  PGY2 Neurology Night Float

## 2017-02-26 NOTE — PHARMACY-VANCOMYCIN DOSING SERVICE
Pharmacy Vancomycin Initial Note  Date of Service 2017  Patient's  1945  71 year old female    Indication: Pneumonia    Current estimated CrCl = Estimated Creatinine Clearance: 67.2 mL/min (based on Cr of 0.95).    Creatinine for last 3 days  2017:  5:40 PM Creatinine 0.95 mg/dL    Recent Vancomycin Level(s) for last 3 days  No results found for requested labs within last 72 hours.      Vancomycin IV Administrations (past 72 hours)                   vancomycin (VANCOCIN) 2,000 mg in NaCl 0.9 % 500 mL intermittent infusion (mg) 2,000 mg New Bag 17              Nephrotoxins and other renal medications     None        Contrast Orders - past 72 hours     None                Plan:  1.  Give vancomycin 2000mg (22mg/kg) x1 in ED--therapy continuation to be determined by admitting team.  2.  Goal Trough Level: 15-20 mg/L   3.  Pharmacy will check trough levels as appropriate in 1-3 Days.    4. Serum creatinine levels will be ordered daily for the first week of therapy and at least twice weekly for subsequent weeks.    5. [place] method utilized to dose vancomycin therapy: Method 2    Romelia Coello, PharmD  PGY-2 Critical Care Pharmacy Resident

## 2017-02-27 NOTE — PLAN OF CARE
Problem: Goal Outcome Summary  Goal: Goal Outcome Summary  Pt A&Ox4, pt remains in afib with rates . BP /70s. Pt on 10L NC (will not use mask). Pt agreed to CT chest but refused CT angio and carotid US. Pt to be NPO at [facility] for cardioversion tomorrow. Pt placed on electrolyte protocol, mag and K replaced. Family at bedside and very helpful with cares. Pt emotional this evening and had a long conversation with me about her fear of dying, emotional support provided and pt more calm. No other acute issues this shift. Will cont to monitor.

## 2017-02-27 NOTE — PROVIDER NOTIFICATION
"Juanito Hall notified of pt feelin \"funny\", \"hard to describe\".  BP 87/72 -135.  US postponed due to pt being symptomatic at this time.  PA to come assess pt.  "

## 2017-02-27 NOTE — ANESTHESIA PREPROCEDURE EVALUATION
Anesthesia Evaluation     . Pt has had prior anesthetic.       ROS/MED HX    ENT/Pulmonary:     (+), recent URI . Other pulmonary disease .    Neurologic:       Cardiovascular:     (+) ----. : . CHF . . :. dysrhythmias a-fib, .      : pulm fibrosis.   METS/Exercise Tolerance:     Hematologic:         Musculoskeletal:         GI/Hepatic:     (+) hiatal hernia,       Renal/Genitourinary:         Endo:         Psychiatric:         Infectious Disease:         Malignancy:         Other:               Physical Exam  Normal systems: pulmonary and dental    Airway   Mallampati: II  Neck ROM: full    Dental     Cardiovascular   Rhythm and rate: irregular and abnormal      Pulmonary                     Anesthesia Plan      History & Physical Review      ASA Status:  4 emergent.    NPO Status:  > 8 hours    Plan for MAC Reason for MAC:  Deep or markedly invasive procedure (G8)    Additional equipment: Arterial Line      Postoperative Care      Consents  Anesthetic plan, risks, benefits and alternatives discussed with:  Patient..        Procedure:  Procedure(s):  Cardioversion     Patient Active Problem List   Diagnosis     Advanced directives, counseling/discussion     CARDIOVASCULAR SCREENING; LDL GOAL LESS THAN 160     Low back pain     ASCUS on Pap smear     Prolapse of vaginal wall     Hiatal hernia -- seen on CT     IPF (idiopathic pulmonary fibrosis) (H)     ACS (acute coronary syndrome) (H)     Non-ischemic cardiomyopathy (H)     Atrial fibrillation (H)     Chronic systolic heart failure (H)     Menopause present -- ?age     Anticoagulant long-term use     Family history of breast cancer in mother     Bilateral thoracic back pain     Long-term (current) use of anticoagulants [Z79.01]     Hypoxia     Paroxysmal atrial fibrillation (H)       Past Medical History   Diagnosis Date     Atrial fibrillation (H) 4/7/2015     Atypical squamous cell changes of undetermined significance (ASCUS) on cervical cytology with positive  high risk human papilloma virus (HPV) age 30 & following     Chronic systolic heart failure (H)      Coughing      Fall against object 2005     Fell 35 feet     IPF (idiopathic pulmonary fibrosis) (H)      chest CT  showed pulm fibrosis but not diagnostic of IPF; VATS R lung bx 2013 +UIP; RAINIER simutuzumab study until ; pirfenidone started .     LBP (low back pain)      On home oxygen therapy      uses when walking >3 minutes     Pneumonia      interstitial lung disease      Vitamin D deficiency        Past Surgical History   Procedure Laterality Date     Arthroscopy knee rt/lt       Right     Surgical history of -        left foot surgery, tendon had , cadaver bone     Laminect/discectomy, lumbar  Oct. 2010     kyphoplasty, vertebroplasty     Right wrist surgery       Colposcopy cervix, biopsy cervix, endocervical curettage, combined  age?     Reported Benign      Foot surgery       L foot tendon     Thoracoscopic wedge resection lung  2013     Procedure: THORACOSCOPIC WEDGE RESECTION LUNG;  Right Thoracscopic Wedge Resection Anesthesia General with block;  Surgeon: José Peralta MD;  Location: [facility] OR     Anesthesia cardioversion N/A 6/3/2015     Procedure: ANESTHESIA CARDIOVERSION;  Surgeon: GENERIC ANESTHESIA PROVIDER;  Location:  OR      esoph/gas reflux test w nasal imped >1 hr N/A 3/17/2016     Procedure: ESOPHAGEAL IMPEDENCE FUNCTION TEST WITH 24 HOUR PH GREATER THAN 1 HOUR;  Surgeon: Gonzalo Reveles MD;  Location: [facility]     Transplant           No current facility-administered medications on file prior to encounter.   Current Outpatient Prescriptions on File Prior to Encounter:  amoxicillin-clavulanate (AUGMENTIN) 875-125 MG per tablet Take 1 tablet by mouth 2 times daily   metoprolol (TOPROL-XL) 25 MG 24 hr tablet TAKE ONE TABLET BY MOUTH ONE TIME DAILY    pirfenidone (ESBRIET) 267 MG capsule Take 3 capsules (801 mg) by mouth 3 times daily (with meals)    SUNSCREEN SPF50 LOTN Apply as needed.   lisinopril (PRINIVIL,ZESTRIL) 2.5 MG tablet Take 1 tablet (2.5 mg) by mouth daily   spironolactone (ALDACTONE) 25 MG tablet TAKE ONE TABLET BY MOUTH EVERY DAY   Ascorbic Acid (VITAMIN C PO)    omeprazole (PRILOSEC) 20 MG capsule Take 1 capsule (20 mg) by mouth daily   order for DME Patient currently gets oxygen form [facility].  Please provide new liquid system to meet increased need.  Patient requires 8 liters oxygen via nasal cannula with oximyzer to maintain oxygen saturation above 88% with activity.  This is for lifetime use.   warfarin (COUMADIN) 7.5 MG tablet Take 1 tablet (7.5 mg) by mouth daily (Patient taking differently: Take 7.5 mg by mouth Take 7.5 mg on Fridays and 5mg all other days of the week or as directed by coumadin clinic)   order for DME Please provide patient with POC (portable oxygen concentrator).  Patient currently uses 2-3 LPM continuous flow oxygen.   ORDER FOR DME Please provide patient with second liquid concentrator for filling smaller tanks.  Patient goes through more oxygen since recent hospitalization and needs a second unit at home.   guaiFENesin-codeine (CHERATUSSIN AC) 100-10 MG/5ML SOLN solution Take 5-10 mLs by mouth every 4 hours as needed for cough   warfarin (COUMADIN) 5 MG tablet Take 5 mg daily or as instructed by coumadin clinic nurse (Patient taking differently: Take 7.5 mg on Fridays and 5mg all other days of the week or as directed by coumadin clinic)   alendronate (FOSAMAX) 70 MG tablet Take 1 tablet (70 mg) by mouth every 7 days   torsemide (DEMADEX) 10 MG tablet Take 1 tablet (10 mg) by mouth daily as needed       Allergies   Allergen Reactions     Vicodin [Hydrocodone-Acetaminophen]      Headache         Recent Labs   Lab Test  02/27/17 0917   HGB  13.2     Recent Labs   Lab Test  02/27/17 0917   POTASSIUM  4.5     Recent Labs   Lab Test  02/27/17 0917   PLT  230     Recent Labs   Lab Test  02/27/17   0634   INR  2.78*        Recent Results (from the past 4320 hour(s))   Echocardiogram Limited    Narrative    [id number]  [facility]  [id number]  [id number]^HENRY^JERRICA^[facility],[place]  Echocardiography Laboratory  [address] [place] [address]     Name: ALYSIA GONSALES  MRN: [id number]  : 1945  Study Date: 2017 10:12 AM  Age: 71 yrs  Gender: Female  Patient Location: [facility]  Reason For Study: Afib  Ordering Physician: JERRICA FIGUEROA  Performed By: Surinder Irwin RDCS     BSA: 2.1 m2  Height: 69 in  Weight: 201 lb  _____________________________________________________________________________  __        Procedure  Limited Portable Echo Adult. Technically difficult study.Extremely poor  acoustic windows. Limited information was obtained during study.  _____________________________________________________________________________  __        Interpretation Summary  Technically difficult study.Extremely poor acoustic windows.  Limited information was obtained during study.  LV function difficult to assess due to atrial fibrillation with rapid  ventricular response.  Mild to moderate right ventricular dilation is present.  Global right ventricular function is moderately reduced.  No pericardial effusion is present.  _____________________________________________________________________________  __        Left Ventricle  LV function difficult to assess due to atrial fibrillation with rapid  ventricular response. Left ventricular size is normal. Mild concentric wall  thickening consistent with left ventricular hypertrophy is present. The  Ejection Fraction is estimated at 30-35%. Left ventricular diastolic function  is indeterminate. Regional wall motion cannot be assessed.     Right Ventricle  Mild to moderate right ventricular dilation is present. Global right  ventricular function is moderately reduced.     Atria  The atria cannot be assessed.        Pericardium  No  pericardial effusion is present.  _____________________________________________________________________________  __  MMode/2D Measurements & Calculations     asc Aorta Diam: 3.2 cm  TAPSE: 1.4 cm        Doppler Measurements & Calculations  TV max P.4 mmHg  PA acc time: 0.06 sec  TR max bo: 284.5 cm/sec  TR max P.4 mmHg           _____________________________________________________________________________  __           Report approved by: Joshua Wang 2017 11:48 AM      ECHO COMPLETE WITH OPTISON    Narrative    Interpretation Summary                    [facility]  [facility]  [address] [place] [address]     Name: ALYSIA GONSALES  MRN: [id number]  : 1945  Study Date: 2017 12:59 PM  Age: 71 yrs  Gender: Female  Patient Location: [facility]  Reason For Study: Chronic systolic (congestive) heart failure  Ordering Physician: DANNI RAZO  Referring Physician: DANNI RAZO  Performed By: LALO Gaitan     BSA: 2.1 m2  Height: 69 in  Weight: 198 lb  BP: 116/74 mmHg  ______________________________________________________________________________        Procedure  Echocardiogram with two-dimensional, color and spectral Doppler performed.  Contrast Optison. Technically difficult study. Poor acoustic windows. Optison  (NDC #7409-2279-55) given intravenously. Patient was given 6 ml mixture of 3  ml Optison and 6 ml saline. 3 ml wasted. IV start location L Hand .  ______________________________________________________________________________     Interpretation Summary  Borderline (EF 50-55%) reduced left ventricular function is present. Traced  at 52%.  Paradoxical septal motion consistent with right ventricular pressure overload  is present.  Mild right ventricular dilation is present. Global right ventricular function  is mildly to moderately reduced.  Right ventricular systolic pressure  is 35mmHg above the right atrial  pressure.  The inferior vena cava was normal in size with preserved respiratory  variability.     No significant change from prior.  ______________________________________________________________________________           Left Ventricle  Left ventricular wall thickness is normal. Borderline (EF 50-55%) reduced  left ventricular function is present. Left ventricular diastolic function is  indeterminate. Paradoxical septal motion consistent with right ventricular  pressure overload is present.     Right Ventricle  Mild right ventricular dilation is present. Global right ventricular function  is mildly to moderately reduced.  Atria  Both atria appear normal.     Mitral Valve  The mitral valve is normal.     Aortic Valve  Aortic valve is normal in structure and function.     Tricuspid Valve  The tricuspid valve is normal. Trace tricuspid insufficiency is present.  Right ventricular systolic pressure is 35mmHg above the right atrial  pressure.     Pulmonic Valve  The pulmonic valve is normal.     Vessels  The thoracic aorta is normal. The inferior vena cava was normal in size with  preserved respiratory variability.  Pericardium  No pericardial effusion is present.     ______________________________________________________________________________  MMode/2D Measurements & Calculations  IVSd: 0.78 cm  LVIDd: 5.1 cm  LVIDs: 3.7 cm  LVPWd: 0.83 cm  FS: 27.3 %  EDV(Teich): 121.7 ml  ESV(Teich): 57.4 ml  LV mass(C)d: 140.0 grams  Ao root diam: 3.8 cm  LA dimension: 3.0 cm  asc Aorta Diam: 3.3 cm  LA/Ao: 0.79  LVOT diam: 2.4 cm  LVOT area: 4.5 cm2     EF(MOD-bp): 52.6 %  LA Volume (BP): 53.0 ml     LA Volume Index (BP): 25.7 ml/m2           Doppler Measurements & Calculations  MV E max bo: 27.4 cm/sec  MV A max bo: 56.2 cm/sec  MV E/A: 0.49  MV dec time: 0.27 sec  PA V2 max: 82.1 cm/sec  PA max P.7 mmHg  PA acc time: 0.17 sec  TR max bo: 291.8 cm/sec  TR max P.4 mmHg  Lateral E/e':  2.9  Medial E/e': 6.8           ______________________________________________________________________________        Report approved by: Douglas Langley 01/09/2017 03:53 PM            Attending Anesthesiologist    Luan Hutton MD    *[phone]

## 2017-02-27 NOTE — PLAN OF CARE
Problem: Goal Outcome Summary  Goal: Goal Outcome Summary  Outcome: No Change  Pt A&Ox4. A-fib 90-130s. Occasional PVCs. ON 10L NC. Pt educated on limitations of NC. Given prn robitussin for cough, relief noted. NPO at midnight for cardioversion today. Up to commode with SBA. Good UOP. No BM this shift. No pain or nausea. No skin issues. Will continue to monitor and notify MD of any changes.

## 2017-02-27 NOTE — PROGRESS NOTES
Care Coordinator Progress Note     Admission Date/Time:  2/25/2017  Attending MD:  Cresencio Donahue MD     Data  Chart reviewed, discussed with interdisciplinary team.   Patient was admitted for:    Pneumonia of right lung due to infectious organism, unspecified part of lung  SOB (shortness of breath)  IPF (idiopathic pulmonary fibrosis) (H)  Acute systolic congestive heart failure (H)  Paroxysmal atrial fibrillation (H).    Concerns with insurance coverage for discharge needs: None.  Current Living Situation: Patient lives with family/spouce  Support System: Supportive and Involved  Services Involved: DME  Transportation: Family or Friend will provide  Barriers to Discharge: chronically ill     Assessment  Met with  in pts room, pt was in U/s at the time.  Per  pt using [employer] for home O2 and he will bring up portable for her dc to home.  Denies any needs at this time.  Cardioversion pending.  Pt on 10 L NC O2 at this time.        Plan  Anticipated Discharge Date: 3/1/17   Anticipated Discharge Plan:  Home with home O2.  No orders placed, will continue to follow for possible need for higher ltr flow at dc.  Transplant continues to follow pt.       Annetta May, CATHYCC, BSN    [facility]    Medicine Group  [address] [place] [id number]    [email or web address]    Office: [phone] Pager: [phone]    3/1/2017 2:33 PM Per care team pt may dc today or tomorrow pending cardiology clearance.  Pt continues to want to use NC at 10L.  Bedside RN staff have attempted wean to pts home dosing.  Pt is 99% on 10 L, spo2 not qualifying for new order to increase O2 to 10 L at home.  Resumption order placed for home O2 with [employer].   will bring in portable O2 for pts dc ride home.  He will also provide dc ride home.  RNCC will follow for additional dc needs, non identified at this time.

## 2017-02-27 NOTE — PLAN OF CARE
"Problem: Goal Outcome Summary  Goal: Goal Outcome Summary  Outcome: No Change  Pt was up in the chair this morning, but started feeling \"funny\" after her meds.  BP  Came down to 87/73 after metoprolol, BP later came back up to 91/69, HR continues to be 125-135 in A-fib.  She has been NPO since [place] and needs to be consented for Cardioversion in the OR due to high O2 needs.  Pt is on 10L NC and 5-10L Oxiplus mask as needed.  Continue to monitor closely.      "

## 2017-02-27 NOTE — PROGRESS NOTES
[facility] - Internal Medicine Daily Note   Date of Service: 2/27/2017  Patient: Sophia Johnson  MRN: [id number]  Admission Date: 2/25/2017  Hospital Day # 1    Assessment & Plan: Sophia Johnson is a 71 year old female with a PMH of IPF, chronic hypoxia, HTN, CHF wth biventricular systolic dysfunction, and PAF on chronic anticoagulation who presented with several days of dyspnea, cough and chest pressure who was found to be in atrial fibrillation w RVR. She was admitted for work up of dyspnea and treatment of A. Fib.     #Acute on Chronic Hypoxic respiratory failure with IPF  Worsening SOB and SAUNDERS with need for increased O2 at home of 10 LPM and desats to ~70% with exertion. Baseline hypoxia 2/2 IPF, normally uses 8 LPM (up from 6-8 LPM in early January). Normally has a dry cough, now with ~2 week history of thick green/brown sputum. Treated with Azithromycin on 2/10/17 for worsening dyspnea and change in sputum with no return to baseline. Subsequently began Augmentin and received 1 dose before presenting to the hospital.  Differential included MI, PE, infection, atrial fibrillation, pulmonary edema 2/2 CHF exacerbation, and pneumothorax. EKG showed A. Fib with RVR. Troponins mildly elevated and trending down. CXR and chest CT without evidence of infection. No fevers or leukocytosis. Procalcitonin and lactic acid WNL.  BC NGTD. BNP elevated (baseline  ~400). Cannot r/o component of fluid overload. PE unlikely as patient appears to be appropriately anticoagulated.  On the transplant list. CT chest with probable progression of chronic fibrotic changes. No new infiltrates or evidence of edema. At this time suspect worsening hypoxia secondary to rapid a-fib and tachycardia-induced cardiomyopathy.  - Continuous O2 monitoring  - Continue O2 10 LPM via NC to maintain sats >90%, wean as able  - Continue Pirfenidone for IPF  - Trend CBC on 2/28  - Robitussin PRN cough  - Pulmonology following - input  appreciated  - Cardioversion planned later today  - Consider diuresis (patient initially deferred)    #Acute on Chronic CHF, NYHA functional Class IIIB, with biventricular dysfunction  Hospitalized in 04/2015 for atrial fibrillation c/b tachycardia induced cardiomyopathy treated with cardioversion and started on Metoprolol XL and Coumadin. Echo from 04/2015 showed LVEF 15-20% c/w tachy-induced cardiomyopathy. Repeat echo January 2017 showed EF 50-55%. Patient currently asymptomatic without s/s of fluid overload: no weight gain, frothy sputum, S3 heart sound, JVD, or peripheral edema. INR therapeutic for the last 3-4 wks per chart review. BNP elevated (baseline  ~400) most likely d/t to stress on heart from tachycardia and pulmonary HTN. Echo (2/27) technically difficult but showed LVEF 30-35%, concerning for tachy-induced cardiomyopathy  - Trend BNP 2/28  - IV lasix x1 to remove fluid after cardioversion    #PAF w/ RVR   First diagnosed in 4/2015, s/p DC cardioversion. Has maintained NSR since then until recently noting increased HR on her home pulse ox on 2/25. EKG (2/25) showed A. Fib with RVR. Troponins mildly elevated and trending down, likely 2/2 demand ischemia. Initially had chest pain but is currently asymptomatic. Difficult management d/t ongoing hypotension.   - Continue daily metoprolol with hold parameters  - Continue Coumadin per pharm dosing  - Cardiology following, planning cardioversion today w anesthesia supervision (concern for worsening hypoxia w sedation and possible need for intubation)  - Further per cardiology.     #GERD Continue daily omeprazole.    #HTN Currently borderline hypotension.     Resolved:   Transient R hemianopsia: reported visual field deficit in ED c/w homonymous hemianopsia. Symptoms resolved spontaneously after 5 minutes. Patient likening to AE of Vancomycin and refusing CTA head/neck.   - US carotid pending    Red Man syndrome: After starting Vancomycin which resolved after  "discontinuation of the drug.    Consulting Services: Pulmonology, Cardiology, Neurology    CODE: Full  DVT: Coumadin  Diet/fluids: NPO until cardioversion  Disposition: Can discharge after cardioversion      Patient's care was discussed with patient team.     Discussed with Dr. Donahue.    Ronal Hall PA-C  Internal Medicine [facility] Hospitalist  [facility]  Pager [phone]        Team: Medicine Gold 1  Page Cross Cover after 5 pm on pager [phone].    ___________________________________________________________________    Subjective & Interval History:  Chief complaint of dyspnea.     Patient has chronic SOB and SAUNDERS with an associated dry cough. Started having thick brown/green sputum with an occasional small drop of blood the last 2 weeks. Patient states she notified her transplant doctor of this change after noticing the blood.    SOB is described as a tightness and feeling that she can't expand her lungs.    She notes she was on a z-ajvad in early February (\"just after the superbowl\"), but didn't feel like she recovered fully.     Last 24 hour care team notes reviewed.   ROS: 4 point ROS (including Respiratory, CV, GI and ) was performed and negative unless otherwise noted in HPI.     Medications: Reviewed in EPIC.    Physical Exam:    Blood pressure (!) 114/98, pulse 70, temperature 97.6  F (36.4  C), temperature source Axillary, resp. rate 20, height 1.753 m (5' 9\"), SpO2 100 %.    GENERAL: No acute distress. O2 via NC at 10 L.  HEENT: PERRL, EOMI.   NECK: No LAD. No tenderness to palpation.  CV: Tachycardic but regular rhythm appreciated. No JVD.  RESPIRATORY: Bilateral fine, dry crackles heard in all lung fields. Dry cough heard on exam.  GI: No tenderness to palpation.  NEURO: Cranial nerves grossly intact. Hearing and speech intact. Moves extremities equally.    MUSCULOSKELETAL/EXTREMITIES: No LE edema. Palpable dorsalis pedis pulses.  SKIN: No suspicious lesions or rashes.  PSYCH: Mentation intact. "     Lines/Tubes/Drains:   Peripheral IV 02/25/17 Left Upper forearm (Active)   Site Assessment WDL 2/27/2017  4:00 AM   Line Status Saline locked 2/27/2017  4:00 AM   Phlebitis Scale 0-->no symptoms 2/27/2017  4:00 AM   Infiltration Scale 0 2/27/2017  4:00 AM   Extravasation? No 2/26/2017  2:49 AM   Number of days:2       Labs & Studies of Note: I personally reviewed the following studies:  ROUTINE IP LABS (Last four results)  CMP   Recent Labs  Lab 02/27/17  0917 02/26/17  0523 02/25/17  1740    139 140   POTASSIUM 4.5 3.8 4.1   CHLORIDE 104 104 106   CO2 30 28 25   ANIONGAP 5 6 10   * 94 97   BUN 10 9 9   CR 1.06* 1.04 0.95   ANJALI 8.5 8.0* 8.6   MAG 2.1 1.7  --    PROTTOTAL  --   --  7.6   ALBUMIN  --   --  3.1*   BILITOTAL  --   --  0.3   ALKPHOS  --   --  63   AST  --   --  17   ALT  --   --  16     CBC   Recent Labs  Lab 02/27/17  0917 02/26/17  0523 02/25/17  1740   WBC 8.1 9.2 8.6   RBC 4.24 3.85 4.22   HGB 13.2 11.8 13.0   HCT 40.3 36.5 39.7   MCV 95 95 94   MCH 31.1 30.6 30.8   MCHC 32.8 32.3 32.7   RDW 13.6 13.7 13.5    235 243     INR   Recent Labs  Lab 02/27/17  0634 02/26/17  0523 02/25/17  1740 02/22/17  1040   INR 2.78* 2.76* 2.56* 2.60*            Unresulted Labs Ordered in the Past 30 Days of this Admission     Date and Time Order Name Status Description    2/26/2017 0357 Blood culture Preliminary     2/26/2017 0300 Respiratory Virus Panel by PCR In process     2/25/2017 1645 Blood culture Preliminary     2/25/2017 1645 Blood culture Preliminary

## 2017-02-27 NOTE — ANESTHESIA PROCEDURE NOTES
Arterial Line Procedure Note  Staff:     Anesthesiologist:  KELLY JACK  Location: In OR Before Induction  Procedure Start/Stop Times:     patient identified, IV checked, site marked, risks and benefits discussed, informed consent, monitors and equipment checked, pre-op evaluation and at physician/surgeon's request      Correct Patient: Yes      Correct Position: Yes      Correct Site: Yes      Correct Procedure: Yes      Correct Laterality:  Yes    Site Marked:  N/A  Line Placement:     Procedure:  Arterial Line    Insertion Site:  Radial    Insertion laterality:  Left    Skin Prep: Chloraprep      Patient Prep: patient draped, mask, sterile gloves, hat and hand hygiene      Local skin infiltration:  1% lidocaine    Ultrasound Guided?: Yes      Artery evaluated via ultrasound confirming patency.   Using realtime imaging, the artery was punctured and the needle was observed entering the artery.      A permanent image is entered into patient's chart.      Catheter size:  20 gauge, Quick cath    Cath secured with: suture      Dressing:  Tegaderm    Complications:  None obvious    Arterial waveform: Yes      IBP within 10% of NIBP: Yes

## 2017-02-27 NOTE — PLAN OF CARE
Down to PACU for cardioversion with bedside RN and float RN. Sats stable 85-90s on 10L NC, Oxyplus available for increased O2 needs. Afib rates 120s-140s. Float RN to accompany patient until procedure start.

## 2017-02-27 NOTE — ANESTHESIA CARE TRANSFER NOTE
Patient: Sophia Johnson    Procedure(s):  Cardioversion     Diagnosis: Atrial Fibrillation   Diagnosis Additional Information: No value filed.    Anesthesia Type:   MAC     Note:  Airway :Face Mask  Patient transferred to:PACU  Comments: Pt recovering from her MAC anesthetic in PACU. Pt cardioverted and recovered in PACU due to her co-morbidities.       Vitals: (Last set prior to Anesthesia Care Transfer)    CRNA VITALS  2/27/2017 1626 - 2/27/2017 1656      2/27/2017             ART BP: 124/71    ART Mean: 86                Electronically Signed By: DAWNA Salmeron CRNA  February 27, 2017  4:56 PM

## 2017-02-27 NOTE — ANESTHESIA POSTPROCEDURE EVALUATION
Patient: Sophia Johnson    Procedure(s):  Cardioversion     Diagnosis:Atrial Fibrillation   Diagnosis Additional Information: No value filed.    Anesthesia Type:  MAC    Note:  Anesthesia Post Evaluation    Patient location during evaluation: PACU  Patient participation: Able to fully participate in evaluation  Level of consciousness: awake and alert  Pain management: adequate  Airway patency: patent  Cardiovascular status: acceptable  Respiratory status: acceptable  Hydration status: acceptable  PONV: none     Anesthetic complications: None          Last vitals:  Vitals:    02/27/17 1141 02/27/17 1655 02/27/17 1700   BP: 91/69 116/85    Pulse:      Resp: 20 12    Temp: 36.7  C (98  F) 36.6  C (97.8  F)    SpO2: 98% 95% 98%         Electronically Signed By: Luan Hutton MD  February 27, 2017  5:11 PM

## 2017-02-27 NOTE — PROGRESS NOTES
Brief Pulmonary Progress Note  02/27/17  Sophia Johnson    No significant changes overnight- a-fib with RVR has continued.      Chest CT reviewed- no obvious pulmonary edema, however, slight progression of UIP noted from March 2016 CT scan.  In discussion with patient/, O2 was turned from 8LNC to 10L NC prior to onset of A-fib with RVR, suggesting her increased O2 requirements may be more related to her UIP, with worsening symptoms due to A-fib with RVR.   Infectious workup negative.     - agree with cardioversion for management of A-fib with RVR  - agree with maintaining at least even fluid balance  - transplant team aware of patient's admission    Pulmonary will continue to follow patient intermittently while hospitalized.  Please do not hesitate to call with questions.     Aleta Dutton MD  Pulmonary and Critical Care Fellow  Pager [phone]

## 2017-02-28 NOTE — PLAN OF CARE
"Problem: Goal Outcome Summary  Goal: Goal Outcome Summary  Outcome: No Change  /72  Pulse 70  Temp 97.9  F (36.6  C) (Oral)  Resp 18  Ht 1.753 m (5' 9\")  Wt 95 kg (209 lb 8 oz)  SpO2 97%  BMI 30.94 kg/m2 on 10L NC.  A&OX4.  Up with SBA. 5-6 L oxiplus with activity.  Denies pain.  PRN robitussin X1 for cough with some relief.  Sitting up in the chair for most of the afternoon.  Resting between cares.  Will continue with POC.           "

## 2017-02-28 NOTE — PROGRESS NOTES
[facility] - Internal Medicine Daily Note   Date of Service: 2/28/2017  Patient: Sophia Johnson  MRN: [id number]  Admission Date: 2/25/2017  Hospital Day # 2    Assessment & Plan: Sophia Johnson is a 71 year old female with a PMH of IPF, chronic hypoxia, HTN, tachycardia induced cardiomyopathy with biventricular systolic dysfunction, and PAF on chronic anticoagulation who presented with several days of dyspnea, cough and chest pressure who was found to be in atrial fibrillation w RVR. She was admitted for work up of dyspnea and treatment of A. Fib.    Acute on Chronic Hypoxic respiratory failure with IPF  Worsening SOB and SAUNDERS with need for increased O2 at home of 10 LPM with desats to ~70% with exertion. Normally has a dry cough, now with ~2 week h/o thick green/brown sputum. Initially treated with Azithromycin on 2/10, then transitioned to Augmentin 1 day PTA d/t persistent symptoms. Primary DDx includes infection, CHF, ACS, and progression of underlying IPF. PE felt to be less likely d/t therapeutic INR.  EKG showed A. Fib with RVR. Troponins mildly elevated but trending down. CT chest showed probable progression of chronic fibrotic changes w/o obvious infiltrates or pulmonary edema. No fever or leukocytosis since admission. Procalcitonin WNL. Underlying infection not suspected, although difficult to r/o completely on imaging d/t fibrotic changes. BNP 6471 (baseline ~400) but w/o clinical evidence of CHF. Chest pressure did resolve with DC Cardioversion (see below) though patient still having significant SOB with desats to 60-70% with exertion. At this time suspect worsening hypoxia 2/2 progression of IPF. Appreciate pulmonary and cardiology consults. On the transplant list.   - Continuous 10 LPM O2 with 5-10 LPM O2 via Oxymasx as needed to maintain sats >90%, wean as able  - Continuous O2 monitoring  - Continue Pirfenidone for IPF  - Trend CBC on 3/1  - Robitussin and tessalon PRN cough     Acute  on Chronic CHF, NYHA functional class IIIB, with biventricular dysfunction  Hospitalized in 04/2015 for atrial fibrillation c/b tachycardia induced cardiomyopathy treated with cardioversion and started on Metoprolol XL and Coumadin. Echo from 4/2015 showed LVEF 15-20% c/w tachy-induced cardiomyopathy. Repeat echo January 2017 showed EF 50-55%. Seen by outpatient cardiologist on 1/9/17 who noted mild pulmonary hypertension 2/2 lung disease, asymptomatic mildly dilated right ventricle with mildly reduced function, unchanged from April 2016. RHC (1/11/2017) showed mild progression of pulmonary hypertension but right sided filling pressures and cardiac output were stable. PFO though advised that closing this could be detrimental to her right ventricular function as she has most likely has right to left atrial shunting present. Patient is currently asymptomatic w/o s/s of fluid overload: no weight gain, frothy sputum, S3, JVD, or LE edema. BNP elevated (baseline ~400) most likely cardiac strain from rapid a-fib. Echo (2/27) technically difficult but showed LVEF 30-35%, concerning for tachy-induced cardiomyopathy  - Daily weights  - Resume ACE for cardiomyopathy  - 2g Sodium restricted diet  - Follow up with cardiology as directed    PAF with RVR s/p DC cardioversion  First diagnosed in 4/2015, s/p DC cardioversion and on chronic anticoagulation. Has maintained NSR since then, but recently noted chest pressure and tachycardia on home pulse ox. EKG (2/25) showed A. Fib with RVR. Troponins mildly elevated but trending down - suspect demand ischemia. CHADSVASC 4. Difficult management d/t ongoing hypotension. Cardiology consulted, s/p DC cardioversion 2/27. Has remained in NSR.  - Continue metoprolol XL 25mg QD with hold parameters  - Continue Coumadin per pharm dosing  - Cardiology following, appreciate input. Follow up as directed.    Pruritis  New c/o itching that started overnight that patient attributes to bed sheets.  Patient notes spouse is bringing a change of clothes. ? Contact dermatitis vs skin irritation. No significant rash noted on exam.   - Trial topical benadryl  - Would transition to hydrocortisone if developing rash    GERD Continue daily omeprazole.    HTN BP well controlled at this time. Hypotensive periodically throughout stay. Monitor.    Resolved:  Transient R hemianopsia: Reported visual field deficit in ED c/w homonymous hemianopsia. Symptoms resolved spontaneously after 5 minutes. Patient likening to AE of Vancomycin and refusing CTA head/neck. Carotid US showed <50% stenosis of both R and L internal carotid arteries.  Red Man syndrome: After starting IV Vancoymcin, which resolved after discontinuation of the drug.    Consulting Services: Cardiology, Pulmonology, Neurology    CODE: Full  DVT: Coumadin  Diet/fluids: PO as tolerated  Disposition:       Patient's care was discussed with patient, and SW during Care Team Rounds.    Case discussed w Dr. Anguiano.    Ronal Hall PA-C  Internal Medicine [facility] Hospitalist  [facility]  Pager [phone]      Team: Medicine Gold 1  Page Cross Cover after 5 pm on pager [phone].    ___________________________________________________________________    Subjective & Interval History:  Chief complaint of chest pressure and SOB.     Chest pressure resolved after cardioversion, though patient still having significant SOB and SAUNDERS with desats to 60-70%.    Coughing overnight and took robitussin x2 with relief. Requesting a more effective cough suppressant that she can take long term.    Has some new itching on her back that started yesterday that she attributes to the bed sheets.    Had 2 BMs yesterday: one formed and one loose.    Patient notes she has a new Y connection for her oxygen tanks so that she can take up to 20LPM O2 at a time. She also notes she gets to take home the oxymask for additional O2 support.    Normally sleeps on 4 pillows at night and gradually takes  "them away until she is sleeping on 2 pillows. Occasionally awakes with coughing fits.     No fevers, chills, HA, nausea, vomiting, chest pain, abdominal pain, dizziness, numbness, or weakness.     Last 24 hour care team notes reviewed.   ROS: 4 point ROS (including Respiratory, CV, GI and ) was performed and negative unless otherwise noted in HPI.     Medications: Reviewed in EPIC.    Physical Exam:    Blood pressure 113/73, pulse 70, temperature 98.2  F (36.8  C), temperature source Oral, resp. rate 18, height 1.753 m (5' 9\"), weight 95 kg (209 lb 8 oz), SpO2 93 %.    GENERAL: NAD. Pleasant and cooperative.  O2 via NC at 10 L  HEENT: PERRL. EOMI. Oropharynx non erythematous.   NECK: Normal ROM. No tenderness to palpation.  CV: RRR, S1 S2, without appreciable murmur, rub or gallop.   RESPIRATORY: Lungs with fine, bibasilar crackles. Good air entry.   GI: No tenderness to palpation.  NEURO: Mentation intact. Hearing and speech intact. CN II-XII intact.  MUSCULOSKELETAL/EXTREMITIES: No LE edema. Extremities warm and well perfused. Palpable dorsalis pedis pulses.  SKIN: Hematoma on ventral L wrist from trauma of anesthesia that is slightly tender to palpation. 3 small papules on upper back with surrounding excoriation. Small drops of blood on her bed sheets.  PSYCH: Cooperative, Affect bright    Lines/Tubes/Drains:   Peripheral IV 02/25/17 Left Upper forearm (Active)   Site Assessment WDL 2/28/2017  7:49 AM   Line Status Saline locked 2/28/2017  7:49 AM   Phlebitis Scale 0-->no symptoms 2/28/2017  7:49 AM   Infiltration Scale 0 2/28/2017  7:49 AM   Extravasation? No 2/26/2017  2:49 AM   Number of days:3       Labs & Studies of Note: I personally reviewed the following studies:  ROUTINE IP LABS (Last four results)  CMP   Recent Labs  Lab 02/28/17  0617 02/27/17  0917 02/26/17  0523 02/25/17  1740    139 139 140   POTASSIUM 3.8 4.5 3.8 4.1   CHLORIDE 104 104 104 106   CO2 29 30 28 25   ANIONGAP 7 5 6 10   GLC 92 " 100* 94 97   BUN 10 10 9 9   CR 1.00 1.06* 1.04 0.95   ANJALI 8.2* 8.5 8.0* 8.6   MAG  --  2.1 1.7  --    PROTTOTAL  --   --   --  7.6   ALBUMIN  --   --   --  3.1*   BILITOTAL  --   --   --  0.3   ALKPHOS  --   --   --  63   AST  --   --   --  17   ALT  --   --   --  16     CBC   Recent Labs  Lab 02/28/17  0617 02/27/17  0917 02/26/17  0523 02/25/17  1740   WBC 7.1 8.1 9.2 8.6   RBC 3.80 4.24 3.85 4.22   HGB 11.6* 13.2 11.8 13.0   HCT 36.4 40.3 36.5 39.7   MCV 96 95 95 94   MCH 30.5 31.1 30.6 30.8   MCHC 31.9 32.8 32.3 32.7   RDW 13.5 13.6 13.7 13.5    230 235 243     INR   Recent Labs  Lab 02/28/17  0617 02/27/17  0634 02/26/17  0523 02/25/17  1740   INR 2.81* 2.78* 2.76* 2.56*            Unresulted Labs Ordered in the Past 30 Days of this Admission     Date and Time Order Name Status Description    2/26/2017 0357 Blood culture Preliminary     2/25/2017 1645 Blood culture Preliminary     2/25/2017 1645 Blood culture Preliminary

## 2017-02-28 NOTE — PLAN OF CARE
"Problem: Goal Outcome Summary  Goal: Goal Outcome Summary  Outcome: No Change  /80 (BP Location: Right arm)  Pulse 70  Temp 97.8  F (36.6  C) (Oral)  Resp 20  Ht 1.753 m (5' 9\")  SpO2 97%     VSS. SR with PVCs. 10L NC, oxyplus used in addition for desaturation. Down to PACU for successful cardioversion this evening. Denies pain. Tolerating diet. Up with SBA. Continue to monitor.       "

## 2017-02-28 NOTE — PLAN OF CARE
"Problem: Goal Outcome Summary  Goal: Goal Outcome Summary  Outcome: No Change  BP 99/72 (BP Location: Right arm)  Pulse 70  Temp 98  F (36.7  C) (Oral)  Resp 20  Ht 1.753 m (5' 9\")  Wt 95 kg (209 lb 8 oz)  SpO2 93%  BMI 30.94 kg/m2     A & Ox4. VSS, afebrile, currently on 10L NC. Dyspnea on exertion. Coughing frequently throughout night, PRN given X2, no sputum with cough. Denies nausea and pain. Sinus rhythm with occasional ectopy. Continue to monitor and notify care team of any changes.       "

## 2017-02-28 NOTE — OP NOTE
CARDIOVERSION        PROCEDURE:  direct current cardioversion  PROCEDURE DATE: 2/27/2017     Pre-procedure diagnosis:  Atrial fibrillation  Post-procedure diagnosis: s/p direct current cardioversion  Complications:  none    BRIEF CLINICAL HISTORY:  Ms. Johnson is a 71 year old female who has a past medical history significant for idiopathic pulmonary fibrosis, hiatal hernia, NICM, and PAF (CHADSVASC 3 on warfarin). She was hospitalized in April 2015 and found to be in A.fib. Echocardiography also demonstrated severely reduced LV and RV function. She underwent coronary angiography that demonstrated non-obstructive CAD. She was started on metoprolol for rate control and warfarin for anticoagulation. She then underwent DCCV on 6/3/15. She then presented to [facility] with increasing dyspnea on exertion, chest pressure and cough. In ER she was found to be in AF with -130s. BNP elevated to 6000s from 400 at baseline. She is being treated for PNA.  She is now admitted to medicine for further management. Cardiology was consulted for assistance with management of atrial fibrillation and she was referred for DCCV.     PROCEDURE:  The patient arrived at the PACU  in a fasting, non-sedated state.  Informed consent was previously obtained from patient, who understood indications, risks, and benefits of the procedure.  INR > 2.0 was verified for at least the last month.  With help from Anesthesia, patient was deeply sedated.  200J of synchronized biphasic shock was delivered through the cardiac monitor, and the patient was successfully converted to normal sinus rhythm.  After sedation wore off, patient awoke and remained neurologically intact.  The patient tolerated the procedure well from a hemodynamic and respiratory standpoint without any complications.  She was observed in the PACU and then transferred back to inpatient unit after sedation wore off and she was ambulatory.    IMPRESSION:  1.  Successful direct current  cardioversion with 200J biphasic shock.    RECOMMENDATIONS/PLANS:  1.   Transfer back to inpatient unit  2.   Continue anticoagualtion    This note was entered by Elizabet Spring on behalf of Dr. Guerra who performed the cardioversion.     Rolo Guerra MD [facility]  Cardiology - Electrophysiology

## 2017-02-28 NOTE — PROGRESS NOTES
"Cardiology Progress Note    Events and interval changes in past 24 hours:   NSR following DCCV yesterday. Sob better    OBJECTIVE FINDINGS:  /72  Pulse 70  Temp 97.9  F (36.6  C) (Oral)  Resp 18  Ht 1.753 m (5' 9\")  Wt 95 kg (209 lb 8 oz)  SpO2 97%  BMI 30.94 kg/m2    Gen: Patient is AOx3, in NAD. Appears comfortable.    CV: irregular tachy, no murmurs/gallops  Lungs: fine crackles in [place] b/l      Intake/Output Summary (Last 24 hours) at 02/26/17 1351  Last data filed at 02/26/17 0822   Gross per 24 hour   Intake                0 ml   Output              450 ml   Net             -450 ml     Wt Readings from Last 5 Encounters:   02/28/17 95 kg (209 lb 8 oz)   01/11/17 91.2 kg (201 lb)   01/09/17 91.2 kg (201 lb)   01/05/17 89.8 kg (198 lb)   09/22/16 89.8 kg (198 lb)         Labs  Reviewed in epic    71F Hx IPF with PH (PA mean 30) on 10L home O2, listed for lung transplant, HFpEF, and PFO, Hx of AF on coumadin, s/p successful DCCV x1 in 2015, remained in SR for 2 years, not on AADs, was in her usual state of health until 3 days prior to admission when she developed URI symptoms, worsening cough, and dyspnea. In ER she was found to be in AF with -130s. BP has been soft SBP 80s-90s    1. Recurrent atrial fibrillation with RVR, now NSR s/p DCCV   2. Hx of NICM, EF on echo 30% (poor quality since pt was in Afib RVR). -continue ToprolXL 25mg  -Recommend restarting Lisinopril 2.5mg today. If SBP consistently >100, please start Spironolactone 25mg tomorrow  -Recommend follow up in [facility] clinic in 1-2 weeks and follow up with Dr. Marcelo in one month    We will sign off      Staffed with Dr. Kaela Wang  General cards fellow, PGY4  Pager [phone]      ATTENDING ATTESTATION:  Patient has been seen and evaluated by me on February 28, 2017. I have reviewed the medications, laboratory, imaging, and other relevant results.  I agree with the above note which I have edited, as necessary.  I have " discussed my assessment and plan with the house staff.    Yeimy Sherwood MD, MS  Staff Cardiologist, [facility]  Pager: [phone]

## 2017-03-01 NOTE — PLAN OF CARE
"Problem: Goal Outcome Summary  Goal: Goal Outcome Summary  Outcome: No Change  /76 (BP Location: Right arm)  Pulse 70  Temp 97.7  F (36.5  C) (Oral)  Resp 18  Ht 1.753 m (5' 9\")  Wt 95 kg (209 lb 8 oz)  SpO2 93%  BMI 30.94 kg/m2 on 10 L NC.  A&OX4.  Up with SBA.  Resting between cares.  Will continue with POC.           "

## 2017-03-01 NOTE — DISCHARGE SUMMARY
[facility] - Internal Medicine Discharge Summary   Date of Service: 3/1/2017    Sophia Johnson MRN# [id number]   YOB: 1945 Age: 71 year old     Date of Admission:  2/25/2017  Date of Discharge:  3/1/2017   Admitting Provider:  Michael Anguiano MD  Discharge Provider:  Juanito Hall PA-C/Silvio, Michael Dye*  Discharging Service:  Internal Medicine, [facility]     Primary Provider: Lesley Christianson         Reason for Admission:   Worsening dyspnea and Atrial fibrillation          Discharge Diagnoses:   IPF with acute on chronic hypoxic respiratory failure  Acute on Chronic CHF, NYHA functional class IIIB, with biventricular dysfunction  PAF with RVR s/p DC cardioversion  Transient R hemianopsia         Procedures & Significant Findings:   CT Chest w/o contrast:  1. Pulmonary fibrosis in a definite UIP pattern; mild progression since 3/14/2016. Pulmonary artery enlargement, likely pulmonary artery hypertension given the degree of fibrosis.  2. There is mediastinal lymphadenopathy but no evident acute parenchymal changes to suggest active infection but the degree of fibrotic changes cannot exclude an underlying infection.    CXR  1. New hazy opacity in the left base may represent atelectasis and/or consolidation.  2. Chronic bilateral interstitial fibrotic changes.    Echocardiogram  Technically difficult study. Extremely poor acoustic windows. Limited information was obtained during study.LV function difficult to assess due to atrial fibrillation with rapid ventricular response. Mild to moderate right ventricular dilation is present.Global right ventricular function is moderately reduced. No pericardial effusion is present    Left Ventricle:  LV function difficult to assess due to atrial fibrillation with rapid  ventricular response. Left ventricular size is normal. Mild concentric wall  thickening consistent with left ventricular hypertrophy is present. The  Ejection Fraction is  estimated at 30-35%.  Left ventricular diastolic function  is indeterminate. Regional wall motion cannot be assessed.     Right Ventricle:  Mild to moderate right ventricular dilation is present. Global right  ventricular function is moderately reduced.     Atria  The atria cannot be assessed.    Pericardium  No pericardial effusion is present.    DC Cardioversion  US Carotid bilateral:  1. Right side: Degree of stenosis: <50% stenosis of the right internal carotid artery.  2. Left side:  Degree of stenosis: <50% stenosis of the left internal carotid artery.         Consultations:   Cardiology   Pulmonology          Hospital Course by Problem:    Acute on Chronic Hypoxic respiratory failure with IPF c/b A. fib  Patient presented with hypoxia, tachycardia, SOB, SAUNDERS, chest pressure, recent URI tx'd with Azithromycin and h/o of requiring 10 LPM O2 at home (baseline 8 LPM) to maintain O2 sats. EKG showed A. Fib with RVR. Mildly elevated troponins felt to be c/w demand ischemia from A. Fib. BNP was elevated but w/o clinical evidence of CHF. CT chest w/ probable progression of chronic fibrotic changes since 3/2016.  Rest of work up negative for alternative etiology of hypoxia. PE felt to be low probability d/t thereapeutic INR. Patient was afebrile and without leukocytosis or signs of infection throughout admission.Required 10 LPM O2 throughout stay. Successfully DC Cardioverted to NSR on 2/27 with subsequent improvement in symptoms, however unable to wean O2. Occasional drop to 70s with exertion. Working diagnosis of worsening hypoxia 2/2 progression of IPF c/b rapid a-fib. On the transplant list. Patient will follow up with pulmonary as directed for further management of IPF and to monitor O2 needs.       Acute on Chronic CHF with biventricular dysfunction and tachycardia-induced cardiomyopathy  History of tachycardia induced cardiomyopathy from A. fib. BNP elevated from baseline on admission but no s/s of fluid  "overload. Likely elevated 2/2 cardiac strain from A fib with RVR. Echo (2/27) showed decreased LVEF of 30-35% (last LVEF 50-55% in January 2017) but was technically difficult d/t to A. Fib. Expect improved LVEF with NSR. Patient already on ACE and beta blocker. Will continue spironolactone and monitor daily weights at home. Follow up in [facility] clinic in 1-2 weeks.      PAF with RVR s/p DC cardioversion  First diagnosed in 4/2015 with successful DC cardioversion. On chronic anticoagulation. Had maintained NSR until recent, having chest pressure and noting tachycardia on home pulse ox. EKG (2/25) showed A. Fib with RVR. Troponins mildly elevated but trending down likely 2/2 demand ischemia. Rate management difficult d/t concomitant hypotension. Cardiology consulted, s/p successful DC cardioversion on 2/27. Patient will cont PTA dose of metoprolol XL and follow up with cardiology in 1-2 weeks. She will continue coumadin per home regimen.      HTN  Intermittent HOTN during admission which improved with DC cardioversion. BP /58-83 last 24 hours. Will need follow up to monitor.     Transient R hemianopsia: Reported visual field deficit in ED c/w homonymous hemianopsia. Symptoms resolved spontaneously after 5 minutes. Neurology consulted, recommending CT head and CTA head/neck to further evaluate. Patient likening symptoms to AE from Vancomycin and refused CTA head/neck. Carotid US showed <50% stenosis of both R and L internal carotid arteries. Symptoms did not recur. Cannot r/o CVA. Patient on appropriate anticoagulation.         Physical Exam on day of discharge:  Blood pressure 90/67, pulse 70, temperature 97.7  F (36.5  C), temperature source Oral, resp. rate 18, height 1.753 m (5' 9\"), weight 95 kg (209 lb 8 oz), SpO2 99 %.  GENERAL: NAD. Pleasant.   HEENT: PERRL. Oropharynx without lesions.   NECK: No LAD  CV: Heart RRR. S1, S2. No JVD  RESPIRATORY: Lungs with fine, crackles bilaterally most prominent in the " bases  GI: Non tender, non-distended  : Deferred  NEURO: Hearing and speech intact. CN II-XII intact. Moving extremities symmetrically.    MUSCULOSKELETAL/EXTREMITIES: Warm and well perfused. No LE edema. Dorsalis pedis pulses present.  SKIN: Hematoma on ventral left wrist from anesthesia  PSYCH: Cooperative. Affect bright.    Lines/Tubes/Drains:   Peripheral IV 02/25/17 Left Upper forearm (Active)   Site Assessment WDL 3/1/2017  8:00 AM   Line Status Saline locked 3/1/2017  8:00 AM   Phlebitis Scale 0-->no symptoms 3/1/2017  8:00 AM   Infiltration Scale 0 3/1/2017  8:00 AM   Extravasation? No 2/26/2017  2:49 AM   Number of days:4              Pending Results:     Unresulted Labs Ordered in the Past 30 Days of this Admission     Date and Time Order Name Status Description    2/26/2017 0357 Blood culture Preliminary     2/25/2017 1645 Blood culture Preliminary     2/25/2017 1645 Blood culture Preliminary                Discharge Medications:     Current Discharge Medication List      START taking these medications    Details   Chlorpheniramine-Codeine 2-10 MG/5ML LIQD Take 5-10 mLs by mouth every 12 hours as needed  Qty: 473 mL, Refills: 0    Associated Diagnoses: Cough         CONTINUE these medications which have NOT CHANGED    Details   calcium-vitamin D (CALTRATE) 600-400 MG-UNIT per tablet Take 1 tablet by mouth daily      metoprolol (TOPROL-XL) 25 MG 24 hr tablet TAKE ONE TABLET BY MOUTH ONE TIME DAILY   Qty: 90 tablet, Refills: 3    Associated Diagnoses: Paroxysmal atrial fibrillation (H); Chronic systolic heart failure (H)      pirfenidone (ESBRIET) 267 MG capsule Take 3 capsules (801 mg) by mouth 3 times daily (with meals)  Qty: 270 capsule, Refills: 11    Associated Diagnoses: IPF (idiopathic pulmonary fibrosis) (H)      SUNSCREEN SPF50 LOTN Apply as needed.  Qty: 250 mL, Refills: 11    Associated Diagnoses: Sensitivity to sunlight      lisinopril (PRINIVIL,ZESTRIL) 2.5 MG tablet Take 1 tablet (2.5 mg) by  mouth daily  Qty: 90 tablet, Refills: 3    Associated Diagnoses: Chronic systolic heart failure (H)      spironolactone (ALDACTONE) 25 MG tablet TAKE ONE TABLET BY MOUTH EVERY DAY  Qty: 90 tablet, Refills: 3    Associated Diagnoses: Combined systolic and diastolic heart failure (H)      Ascorbic Acid (VITAMIN C PO)       omeprazole (PRILOSEC) 20 MG capsule Take 1 capsule (20 mg) by mouth daily  Qty: 90 capsule, Refills: 6    Associated Diagnoses: IPF (idiopathic pulmonary fibrosis) (H); Hiatal hernia      !! order for DME Patient currently gets oxygen form [facility].  Please provide new liquid system to meet increased need.  Patient requires 8 liters oxygen via nasal cannula with oximyzer to maintain oxygen saturation above 88% with activity.  This is for lifetime use.  Qty: 1 Device, Refills: 0    Associated Diagnoses: ILD (interstitial lung disease) (H); Hypoxia      !! warfarin (COUMADIN) 7.5 MG tablet Take 1 tablet (7.5 mg) by mouth daily  Qty: 30 tablet, Refills: 1    Associated Diagnoses: Atrial fibrillation with RVR (H)      !! order for DME Please provide patient with POC (portable oxygen concentrator).  Patient currently uses 2-3 LPM continuous flow oxygen.  Qty: 1 Device, Refills: 0    Associated Diagnoses: IPF (idiopathic pulmonary fibrosis) (H); Hypoxia      !! ORDER FOR DME Please provide patient with second liquid concentrator for filling smaller tanks.  Patient goes through more oxygen since recent hospitalization and needs a second unit at home.  Qty: 1 Device, Refills: 0    Associated Diagnoses: IPF (idiopathic pulmonary fibrosis) (H)      guaiFENesin-codeine (CHERATUSSIN AC) 100-10 MG/5ML SOLN solution Take 5-10 mLs by mouth every 4 hours as needed for cough  Qty: 236 mL, Refills: 0    Associated Diagnoses: Cough      !! warfarin (COUMADIN) 5 MG tablet Take 5 mg daily or as instructed by coumadin clinic nurse  Qty: 90 tablet, Refills: 3    Associated Diagnoses: Atrial fibrillation with RVR (H)       alendronate (FOSAMAX) 70 MG tablet Take 1 tablet (70 mg) by mouth every 7 days  Qty: 4 tablet, Refills: 11    Associated Diagnoses: Compression fracture of T12 vertebra, with routine healing, subsequent encounter; Osteopenia      torsemide (DEMADEX) 10 MG tablet Take 1 tablet (10 mg) by mouth daily as needed  Qty: 90 tablet, Refills: 3    Associated Diagnoses: Non-ischemic cardiomyopathy (H)       !! - Potential duplicate medications found. Please discuss with provider.      STOP taking these medications       amoxicillin-clavulanate (AUGMENTIN) 875-125 MG per tablet Comments:   Reason for Stopping:                    Discharge Instructions and Follow-Up:     Discharge Procedure Orders  Reason for your hospital stay   Order Comments: Admitted with worsening hypoxia and atrial fibrillation. You were seen by both cardiology and pulmonology during your hospital stay. You underwent cardioversion for a-fib with conversion to normal sinus rhythm. 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 No other acute findings were identified to explain your worsening hypoxia. We suspect this may be due to your chronic lung disease.     Adult [facility] Follow-up and recommended labs and tests   Order Comments: Follow up with Dr. Marcelo in [facility] clinic in 1-2 weeks.    Follow up with Dr. Bowman already scheduled 4/14/17, but recommend calling clinic to schedule follow up sooner.    Appointments on [place] and/or [facility] (with [facility] or [facility] provider or service). Call [phone] if you haven't heard regarding these appointments within 7 days of discharge.     Activity   Order Comments: Your activity upon discharge: activity as tolerated   Order Specific Question Answer Comments   Is discharge order? Yes      Full Code     Oxygen Adult   Order Comments: Renew Home Oxygen Order  Renew previous prescription.  Expected treatment length is indefinite (99 months).  ___________________________  [facility]  "Respiratory  Phone  [phone]  Fax  [phone]  ___________________________   Attending Provider: CELSA Jones  Physician signature: See electronic signature associated with these discharge orders  Date of Order: March 1, 2017     Diet   Order Comments: Follow this diet upon discharge: Orders Placed This Encounter     2 Gram Sodium Diet   Order Specific Question Answer Comments   Is discharge order? Yes                  Discharge Disposition:   Home          Condition on Discharge:   Discharge condition: Stable   Code status on discharge: Full Code        35 minutes spent in discharge, including >50% in counseling and coordination of care, medication review and plan of care recommended on follow up.     Patient was evaluated on day of discharge by attending physician, Michael Anguiano*, who agrees with plan of care.    Discharge summary was forwarded to Lesley Christianson (PCP) at the time of discharge, so as to bridge from hospital to outpatient care.     It was our pleasure to care for Sophia Johnson during this hospitalization. Please do not hesitate to contact me should there be questions regarding the hospital course or discharge plan.      Ronal Hall PA-C  Internal Medicine [facility] Hospitalist  [facility]  Pager [phone]      Attestation:     Sophia Johnson was seen today      3/1/2017    Patient is stable for DC. Please refer to full DC summary for details.     Discharge Diagnosis:     IPF with acute on chronic hypoxic respiratory failure  Acute on Chronic CHF, NYHA functional class IIIB, with biventricular dysfunction  PAF with RVR s/p DC cardioversion  Transient R hemianopsia    S. Patient  is doing well. No issues offered.     O/E: Vital signs:                    Oxygen Delivery: 10 LPM Height: 175.3 cm (5' 9\") Weight: 95 kg (209 lb 8 oz)  Estimated body mass index is 30.94 kg/(m^2) as calculated from the following:    Height as of this encounter: 1.753 m (5' 9\").    " Weight as of this encounter: 95 kg (209 lb 8 oz).      S1S2 normal, lungs BL inspiratory cracles noted, Abdomen: Nontender.       Michael Anguiano MD  Hospitalist, [facility]

## 2017-03-01 NOTE — PLAN OF CARE
Problem: Goal Outcome Summary  Goal: Goal Outcome Summary  Outcome: No Change  Patient is A & O x4, able to make needs known, uses call light appropriately, up SBA.  No c/o chest pain, N/V, dizziness and denies pain, c/o frequent cough, given robitussin and tessalon pearls; sinus rhythm, HR 70s; VSS; afebrile; LS fine crackles in bases, on 10L of O2 per NC with sats maintained above 92%; adequate UOP.  Patient rested most of shift. Discussed plan of care with patient.  Will continue to monitor tele, vitals, pain and respiratory status.  Continue with current plan of care and notify MD as needed.     Michelle Garcia RN

## 2017-03-02 NOTE — PROGRESS NOTES
ANTICOAGULATION FOLLOW-UP CLINIC VISIT    Patient Name:  Sophia Johnson  Date:  3/2/2017  Contact Type:  Telephone    SUBJECTIVE:        OBJECTIVE    INR   Date Value Ref Range Status   03/01/2017 2.69 (H) 0.86 - 1.14 Final       ASSESSMENT / PLAN  No question data found.  Anticoagulation Summary as of 3/2/2017     INR goal 2.0-3.0   Today's INR    Plan last modified Bianca Olsen RN (6/20/2016)   Next INR check    Target end date     Indications   Long-term (current) use of anticoagulants [Z79.01] [Z79.01]  Atrial fibrillation (H) [I48.91]         Anticoagulation Episode Summary     INR check location     Preferred lab     Send INR reminders to [facility]    Comments       Anticoagulation Care Providers     Provider Role Specialty Phone number    Mario Martin MD Responsible Cardiology [phone]            See the Encounter Report to view Anticoagulation Flowsheet and Dosing Calendar (Go to Encounters tab in chart review, and find the Anticoagulation Therapy Visit)    Spoke with Martin, spouse.  Pat may not keep 3/8/17 appointment scheduled, unsure who provider is and reason was setup.  Prefers to have lab draw at [facility].  Plans to have lab 3/7/17.  Requested Pat call back at her convenience today when able to.    Cassy Lagunas [facility]

## 2017-03-02 NOTE — PROGRESS NOTES
ANTICOAGULATION FOLLOW-UP CLINIC VISIT    Patient Name:  Sophia Johnson  Date:  3/2/2017  Contact Type:  Telephone    SUBJECTIVE:        OBJECTIVE    INR   Date Value Ref Range Status   03/01/2017 2.69 (H) 0.86 - 1.14 Final       ASSESSMENT / PLAN  INR assessment THER    Recheck INR In: 1 WEEK    INR Location Clinic      Anticoagulation Summary as of 3/2/2017     INR goal 2.0-3.0   Today's INR 2.69 (3/1/2017)   Maintenance plan 7.5 mg (5 mg x 1.5) on Fri; 5 mg (5 mg x 1) all other days   Full instructions 7.5 mg on Fri; 5 mg all other days   Weekly total 37.5 mg   No change documented Cassy Lagunas RPH   Plan last modified Bianca Olsen RN (6/20/2016)   Next INR check 3/8/2017   Priority INR   Target end date     Indications   Long-term (current) use of anticoagulants [Z79.01] [Z79.01]  Atrial fibrillation (H) [I48.91]         Anticoagulation Episode Summary     INR check location     Preferred lab     Send INR reminders to [facility]    Comments       Anticoagulation Care Providers     Provider Role Specialty Phone number    Mario Martin MD Responsible Cardiology [phone]            See the Encounter Report to view Anticoagulation Flowsheet and Dosing Calendar (Go to Encounters tab in chart review, and find the Anticoagulation Therapy Visit)    Spoke with Martin earlier in morning, Pat called back.  Notes no new medications diet changes or procedures scheduled.  Notes large bruise on left arm from wrist upwards towards elbow acquired during cardioversion and having line placed in artery.  Advised to follow up if any signs or symptoms of potential clot as bruising resolves.  Resume previous dosing schedule: 7.5mg Fri, 5mg other 6 days.  INR planned when returns to clinic 03/08/2017.    Cassy Lagunas RPH

## 2017-03-02 NOTE — PLAN OF CARE
DISCHARGE                         3/1/2017  6:35 PM  ----------------------------------------------------------------------------  Discharged to: Home  Via: private transportation  Accompanied by: Her   Discharge Instructions: diet, activity, medications, follow up appointments, when to call the MD, aftercare instructions.  Prescriptions: To be filled by her private pharmacy; medication list reviewed & sent with pt  Follow Up Appointments: arranged; information given  Belongings: All sent with pt  IV: d/c'd  Telemetry: d/c'd  Pt exhibits understanding of above discharge instructions; all questions answered.    Discharge Paperwork: Signed, copied, and sent home with patient.

## 2017-03-02 NOTE — MR AVS SNAPSHOT
Sophia Johnson   3/2/2017   Anticoagulation Therapy Visit    Description:  71 year old female   Provider:  Cassy Lagunas [facility]   Department:  [facility]           INR as of 3/2/2017     Today's INR 2.69 (3/1/2017)      Anticoagulation Summary as of 3/2/2017     INR goal 2.0-3.0   Today's INR 2.69 (3/1/2017)   Full instructions 7.5 mg on Fri; 5 mg all other days   Next INR check 3/7/2017    Indications   Long-term (current) use of anticoagulants [Z79.01] [Z79.01]  Atrial fibrillation (H) [I48.91]         March 2017 Details    Sun Mon Tue Wed Thu Fri Sat        1               2      5 mg   See details      3      7.5 mg         4      5 mg           5      5 mg         6      5 mg         7            8               9               10               11                 12               13               14               15               16               17               18                 19               20               21               22               23               24               25                 26               27               28               29               30               31                 Date Details   03/02 This INR check       Date of next INR:  3/7/2017         How to take your warfarin dose     To take:  5 mg Take 1 of the 5 mg tablets.    To take:  7.5 mg Take 1.5 of the 5 mg tablets.

## 2017-03-02 NOTE — PROGRESS NOTES
"[facility]  \"Hello, my name is Tasha Sales , and I am calling from the [facility].  I want to check in and see how you are doing, after leaving the hospital.  You may also receive a call from your Care Coordinator (care team), but I want to make sure you don t have any urgent needs.  I have a couple questions to review with you:     Post-Discharge Outreach                                                    Sophia Johnson is a 71 year old female     Follow-up Appointments           [facility] Follow-up and recommended labs and tests       Follow up with Dr. Marcelo in [facility] clinic in 1-2 weeks.     Follow up with Dr. Bowman already scheduled 4/14/17, but recommend calling clinic to schedule follow up sooner.     Appointments on [place] and/or [facility] (with [facility] or [facility] provider or service). Call [phone] if you haven't heard regarding these appointments within 7 days of discharge.                       Your next 10 appointments already scheduled            Mar 08, 2017 9:30 AM CST   (Arrive by 9:15 AM)   Return Visit with DAWNA Almendarez CNP   [facility] ([facility])     [address] [place] [address]  [address] [facility] [id number] [phone]                  Mar 14, 2017 6:30 PM CDT   (Arrive by 6:15 PM)   CORE RETURN with DAWNA Cruz CNP   [facility] ([facility])     [address] [place] [address]  [address] [facility] [id number] [phone]                  Apr 14, 2017 7:45 AM CDT   LAB with  LAB   [facility] ([facility])     [address] [place] [address]  [address] [facility] [id number] [phone]                 Patient must bring picture ID. Patient should be prepared to give a urine specimen Please do not eat 10-12 hours before your appointment if you are coming in fasting for labs on lipids, cholesterol, or glucose " (sugar). Pregnant women should follow their Care Team instructions. Water with medications is okay. Do not drink coffee or other fluids. If you have concerns about taking your medications, please ask at office or if scheduling via [employer], send a message by clicking on Secure Messaging, Message Your Care Team.                  Apr 14, 2017 8:00 AM CDT   SIX MINUTE WALK with UC PFL B   [facility] ([facility])     [address] [place] [id number]   [phone]                  Apr 14, 2017 9:30 AM CDT   SIX MINUTE WALK with UC PFL 6 MINUTE WALK 1   [facility] ([facility])     [address] [place] [id number]   [phone]                  Apr 14, 2017 9:30 AM CDT   (Arrive by 9:15 AM)   Return Interstitial Lung with Lavinia Bowman MD   [facility] ([facility])     [address] [place] [address]   [phone]                  Jul 03, 2017 2:00 PM CDT   (Arrive by 1:45 PM)   RETURN HEART FAILURE with Mario Martin MD   [facility] ([facility])              Care Team:    Patient Care Team       Relationship Specialty Notifications Start End    Lesley Christianson MD PCP - General Internal Medicine  7/28/15     Phone: [phone] Pager: [phone] Fax: [phone]        [address] [address]    Jt Dsouza MD MD Pulmonary  3/7/13     Phone: [phone] Fax: [phone]         [facility] MEDICAL  [facility] [address]    Carley Ulloa, RN Registered Nurse Urology Abnormal results only, Admissions 6/19/14     Comment:  [phone]    Phone: [phone] Pager: [phone]        Lavinia Bowman MD MD Pulmonary  4/20/15     Phone: [phone] Fax: [phone]           [facility] [address] [facility] [address] [facility] [address]    Amy Melendez, RN Nurse Coordinator Clinical Cardiac Electrophysiology Admissions 6/2/15     Phone: [phone] Fax: [phone]         [address] [address]    Rolo Guerra MD Referring Physician Clinical Cardiac Electrophysiology Admissions 6/2/15     Comment:  Referring to internal med/ pcc    Phone: [phone] Fax: [phone]         [address] [address]    Saad Barnett MD MD Nephrology  7/9/15     Phone: [phone] Fax: [phone]          [facility] [address] [facility] [address] [facility] [address]    Rafaela Douglas, RN Nurse Coordinator Cardiology Admissions 10/13/15     Phone: [phone] Pager: [phone]        Mario Martin MD MD Cardiology  1/18/16     Phone: [phone] Fax: [phone]         [address] [facility] [address]    Carlos Tobin MD Resident Student in organized health care education/training program  4/21/16     Phone: [phone] Fax: [phone]         [address] [facility] [address]    Lor Mills, RN Registered Nurse Cardiology Admissions 1/9/17     Phone: [phone]         Wick, Graciela Kimmie, APRN CNP Nurse Practitioner Nurse Practitioner - Gerontology  3/1/17     Phone: [phone] Fax: [phone]         [address] [facility] [address]            Transition of Care Review                                                      Did you have a surgery or procedure during your hospital visit? Yes   If yes, do you have any of the following:     Signs of infection:  No    Pain:  No     Pain Scale (0-10) 0/10     Location: NA    Wound/incision concerns? NA    Do you have all of your medications/refills?  Yes    Are you having any side effects or questions about your medication(s)? No    Do you have any new or worsening symptoms?  No    Do you have any  future appointments scheduled?   Yes      Patient reports one take is hooked up to 4L/min and other O2 tank is at 10L/min and this is helping her             Plan                                                      Thanks for your time.  Your Care Coordinator may follow-up within the next couple days.  In the meantime if you have questions, concerns or problems call your care team.        Tasha Sales

## 2017-03-03 NOTE — TELEPHONE ENCOUNTER
"Contacted Pat in follow-up to recent hospitalization.  She is feeling better.  She said her heart rate was up a little bit yesterday, but has normalized today.  She was admitted for atrial fib with RVR for which she was successfully cardioverted.  Pat said her symptoms at home when this happens are that she \"feels funny\", is exhausted, and both she and her  are unable to find her pulse.   Pat will call with any changes in her condition or worsening symptoms.     Reverified that her baseline oxygen needs are 10 liters at rest as per hospital notes.  LAS has been updated on [facility] wait list with LAS to 41.9374.  Confirmed LAS update with Pat.    "

## 2017-03-09 NOTE — PROGRESS NOTES
ANTICOAGULATION FOLLOW-UP CLINIC VISIT    Patient Name:  Sophia Johnson  Date:  3/9/2017  Contact Type:  Telephone    SUBJECTIVE:     Patient Findings     Positives No Problem Findings           OBJECTIVE    INR   Date Value Ref Range Status   03/09/2017 2.40 (H) 0.86 - 1.14 Final     Comment:     This test is intended for monitoring Coumadin therapy.  Results are not   accurate   in patients with prolonged INR due to factor deficiency.         ASSESSMENT / PLAN  INR assessment THER    Recheck INR In: 4 WEEKS    INR Location Clinic      Anticoagulation Summary as of 3/9/2017     INR goal 2.0-3.0   Today's INR 2.40   Maintenance plan 7.5 mg (5 mg x 1.5) on Fri; 5 mg (5 mg x 1) all other days   Full instructions 7.5 mg on Fri; 5 mg all other days   Weekly total 37.5 mg   Plan last modified Bianca Olsen, RN (6/20/2016)   Next INR check 4/6/2017   Priority INR   Target end date     Indications   Long-term (current) use of anticoagulants [Z79.01] [Z79.01]  Atrial fibrillation (H) [I48.91]         Anticoagulation Episode Summary     INR check location     Preferred lab     Send INR reminders to [facility]    Comments       Anticoagulation Care Providers     Provider Role Specialty Phone number    Mario Martin MD Responsible Cardiology [phone]            See the Encounter Report to view Anticoagulation Flowsheet and Dosing Calendar (Go to Encounters tab in chart review, and find the Anticoagulation Therapy Visit)    Spoke with spouse Martin Daley, RN

## 2017-03-09 NOTE — MR AVS SNAPSHOT
oSphia Johnson   3/9/2017   Anticoagulation Therapy Visit    Description:  71 year old female   Provider:  Helen Daley, RN   Department:  [facility]           INR as of 3/9/2017     Today's INR 2.40      Anticoagulation Summary as of 3/9/2017     INR goal 2.0-3.0   Today's INR 2.40   Full instructions 7.5 mg on Fri; 5 mg all other days   Next INR check 4/6/2017    Indications   Long-term (current) use of anticoagulants [Z79.01] [Z79.01]  Atrial fibrillation (H) [I48.91]         March 2017 Details    Sun Mon Tue Wed Thu Fri Sat        1               2               3               4                 5               6               7               8               9      5 mg   See details      10      7.5 mg         11      5 mg           12      5 mg         13      5 mg         14      5 mg         15      5 mg         16      5 mg         17      7.5 mg         18      5 mg           19      5 mg         20      5 mg         21      5 mg         22      5 mg         23      5 mg         24      7.5 mg         25      5 mg           26      5 mg         27      5 mg         28      5 mg         29      5 mg         30      5 mg         31      7.5 mg           Date Details   03/09 This INR check               How to take your warfarin dose     To take:  5 mg Take 1 of the 5 mg tablets.    To take:  7.5 mg Take 1.5 of the 5 mg tablets.           April 2017 Details    Sun Mon Tue Wed Thu Fri Sat           1      5 mg           2      5 mg         3      5 mg         4      5 mg         5      5 mg         6            7               8                 9               10               11               12               13               14               15                 16               17               18               19               20               21               22                 23               24               25               26               27               28               29                  30                      Date Details   No additional details    Date of next INR:  4/6/2017         How to take your warfarin dose     To take:  5 mg Take 1 of the 5 mg tablets.

## 2017-03-20 NOTE — PROGRESS NOTES
Updated LAS with most recent O2 needs and activity tolerance.  LAS now 51.1727, up from 41.4546.      Called and spoke with Martin, patient's .  He is aware of the score update.  Answered questions regarding list, donor service area, Pat's time on waitlist, etc.  Encouraged Martin and Pat to call if they have any further questions.

## 2017-03-20 NOTE — Clinical Note
Maria Del Rosario,  Jessica's O2 needs have increased to 16L, is becoming more limited in her ADLs.  Updated LAS.  Now 51.1727, up from 41.4546.  Thanks, Kenia Quiroz

## 2017-03-20 NOTE — PATIENT INSTRUCTIONS
You were seen at the [facility] today.  You saw Dr. Martin  Here are your Instructions:    1.  Echo today or April 4/14.  2.  See Dr. Martin back in 3 months.      Lor Mills RN  Nurse Care Coordinator  Office:  [phone] option #1, then #3 & ask for Lor (nurse line)  Fax:  [phone]  After Hours:  [phone]  Appointments:  [phone] option #1, then option #1    Results for ALYSIA GONSALES (MRN [id number]) as of 3/20/2017 14:19   Ref. Range 3/20/2017 13:32   Sodium Latest Ref Range: 133 - 144 mmol/L 141   Potassium Latest Ref Range: 3.4 - 5.3 mmol/L 4.0   Chloride Latest Ref Range: 94 - 109 mmol/L 101   Carbon Dioxide Latest Ref Range: 20 - 32 mmol/L 33 (H)   Urea Nitrogen Latest Ref Range: 7 - 30 mg/dL 8   Creatinine Latest Ref Range: 0.52 - 1.04 mg/dL 1.06 (H)   GFR Estimate Latest Ref Range: >60 mL/min/1.7m2 51 (L)   GFR Estimate If Black Latest Ref Range: >60 mL/min/1.7m2 62   Calcium Latest Ref Range: 8.5 - 10.1 mg/dL 8.9   Anion Gap Latest Ref Range: 3 - 14 mmol/L 7   N-Terminal Pro Bnp Latest Ref Range: 0 - 125 pg/mL 2976 (H)   Glucose Latest Ref Range: 70 - 99 mg/dL 94   WBC Latest Ref Range: 4.0 - 11.0 10e9/L 9.6   Hemoglobin Latest Ref Range: 11.7 - 15.7 g/dL 14.2   Hematocrit Latest Ref Range: 35.0 - 47.0 % 42.9   Platelet Count Latest Ref Range: 150 - 450 10e9/L 263   RBC Count Latest Ref Range: 3.8 - 5.2 10e12/L 4.54   MCV Latest Ref Range: 78 - 100 fl 95   MCH Latest Ref Range: 26.5 - 33.0 pg 31.3   MCHC Latest Ref Range: 31.5 - 36.5 g/dL 33.1   RDW Latest Ref Range: 10.0 - 15.0 % 13.4

## 2017-03-20 NOTE — PROGRESS NOTES
ANTICOAGULATION FOLLOW-UP CLINIC VISIT    Patient Name:  Sophia Johnson  Date:  3/20/2017  Contact Type:  Telephone    SUBJECTIVE:     Patient Findings     Positives No Problem Findings           OBJECTIVE    INR   Date Value Ref Range Status   03/20/2017 2.35 (H) 0.86 - 1.14 Final       ASSESSMENT / PLAN  INR assessment THER    Recheck INR In: 4 WEEKS    INR Location Clinic      Anticoagulation Summary as of 3/20/2017     INR goal 2.0-3.0   Today's INR 2.35   Maintenance plan 7.5 mg (5 mg x 1.5) on Fri; 5 mg (5 mg x 1) all other days   Full instructions 7.5 mg on Fri; 5 mg all other days   Weekly total 37.5 mg   No change documented Claudia Nash RN   Plan last modified Bianca Olsen RN (6/20/2016)   Next INR check 4/14/2017   Priority INR   Target end date     Indications   Long-term (current) use of anticoagulants [Z79.01] [Z79.01]  Atrial fibrillation (H) [I48.91]         Anticoagulation Episode Summary     INR check location     Preferred lab     Send INR reminders to [facility]    Comments       Anticoagulation Care Providers     Provider Role Specialty Phone number    Mario Martin MD Responsible Cardiology [phone]            See the Encounter Report to view Anticoagulation Flowsheet and Dosing Calendar (Go to Encounters tab in chart review, and find the Anticoagulation Therapy Visit)    Spoke with Jessica.  She reports no changes in health, diet, medications.  She has an appt at the [facility] on 4/14/17 and will check INR then.    Claudia Nash RN

## 2017-03-20 NOTE — TELEPHONE ENCOUNTER
Received phone call from Dr. Martin on speaker phone who was with Sophia in an exam room.  He called to update coordinator of Pat's increased O2 need and that her activity tolerance has decreased, something that has changed in the past 6 months.      O2: 16 L @ rest    Activity: Does not do grocery shopping, does not prepare meals as  has to do it, unable to clean their home -- all of which is a change from previous baseline for patient.  She used to prefer taking baths over showers, but finds it incredibly dfficult to get into and out of bath tub so is using the shower with a shower chair.  Her  assists her into and out of the shower.  She is also considering cutting her hair off as shampooing her hair is becoming increasingly difficult.      Karnofsky: 40%    Dr. Martin also noted that during his examination she was in a wheelchair and took two steps up to exam table.  He noted that her sats dropped considerably with that little movement and it took her a few moments to recover before being able to speak.      Jessica and Dr. Martin aware that patient's LAS will be updated and that Dr. Lavinia Bowman, Dr. Bj Mckeon, and Marybeth Rosario, coordinator will be updated of patient's condition.

## 2017-03-20 NOTE — MR AVS SNAPSHOT
Sophia Johnson   3/20/2017   Anticoagulation Therapy Visit    Description:  71 year old female   Provider:  Claudia Nash, RN   Department:  [facility]           INR as of 3/20/2017     Today's INR 2.35      Anticoagulation Summary as of 3/20/2017     INR goal 2.0-3.0   Today's INR 2.35   Full instructions 7.5 mg on Fri; 5 mg all other days   Next INR check 4/14/2017    Indications   Long-term (current) use of anticoagulants [Z79.01] [Z79.01]  Atrial fibrillation (H) [I48.91]         March 2017 Details    Sun Mon Tue Wed Thu Fri Sat        1               2               3               4                 5               6               7               8               9               10               11                 12               13               14               15               16               17               18                 19               20      5 mg   See details      21      5 mg         22      5 mg         23      5 mg         24      7.5 mg         25      5 mg           26      5 mg         27      5 mg         28      5 mg         29      5 mg         30      5 mg         31      7.5 mg           Date Details   03/20 This INR check               How to take your warfarin dose     To take:  5 mg Take 1 of the 5 mg tablets.    To take:  7.5 mg Take 1.5 of the 5 mg tablets.           April 2017 Details    Sun Mon Tue Wed Thu Fri Sat           1      5 mg           2      5 mg         3      5 mg         4      5 mg         5      5 mg         6      5 mg         7      7.5 mg         8      5 mg           9      5 mg         10      5 mg         11      5 mg         12      5 mg         13      5 mg         14            15                 16               17               18               19               20               21               22                 23               24               25               26               27               28               29                  30                      Date Details   No additional details    Date of next INR:  4/14/2017         How to take your warfarin dose     To take:  5 mg Take 1 of the 5 mg tablets.    To take:  7.5 mg Take 1.5 of the 5 mg tablets.

## 2017-03-20 NOTE — NURSING NOTE
Chief Complaint   Patient presents with     Follow Up For     2 month f/u [facility] done 1/11/2017, hospitalized 2/27-3/1 afib rvr s/p cv, biv heart failure, IPF on lung tx list

## 2017-03-20 NOTE — LETTER
3/20/2017      RE: Sophia Johnson  [address] [facility] [address]       2017             Chuck De Anda MD   [place] [id number]      Lavinia Bowman MD   [facility]    [address] [place] [address]      RE: Sophia Johnson   MRN: [id number]   : 1945      Dear Dr. De Anda:      We had the pleasure of seeing Ms. Sophia Johnson in our Advanced Heart Failure and Transplant Clinic.  As you know, she is a 71-year-old female with a past medical history significant for:     1.  Idiopathic pulmonary fibrosis, who is currently listed for lung transplant.   2.  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 She was in our institution from  till 3/1/2017 due to worsening dyspnea and atrial fibrillation with RVR.  3. 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 Johnson returns today after her hospital stay as outlined above.  She has been having a noticeable progression of her exertional shortness of breath and poor energy level.  Normally, she would be able to climb 1 flight of stairs; now she has to stop twice before she finishes her stairs.  It took her only to move from her chair to examination table to see reduction in her SPO2 to low 80's and a jump in her heart rate to >110 bpm.  Her oxygen requirements have also been increasing to more than 10 L/m during her hospitalization but the staff failed to document it.  She is currently on 16 L/m at rest.  She has not had any worsening lower extremity swelling or abdominal distention.  Her weight has been stable.  She has had occasional lightheadedness and dizziness, but no syncope.  She has chest tightness when carries shopping items with cough.  She has no PND or orthopnea.  I would currently characterize her as NYHA functional class IIIB.      CURRENT MEDICATIONS:    1.  Toprol XL 25 mg daily.   2.  Lisinopril 2.5 mg daily.   3.  Spironolactone 25 mg daily.   4.  Torsemide 10 mg daily.     5.  Pirfenidone 3 capsules 3 times daily.   6.  Alendronate 70 mg daily.   7.  Coumadin as directed.   8.  Prilosec 20 mg daily.   9.  Vitamin D 3000 units daily.   10.  Vitamin C 1 tablet daily.      REVIEW OF SYSTEMS:  A detailed 10 point review of systems was obtained as described in the History of Present Illness.  All other systems were reviewed and are negative.      PHYSICAL EXAMINATION:  She was in no apparent distress while resting.  Her blood pressure was 87/61.  Her pulse rate was 91.  Her respiratory rate was 16.  She was saturating 95% on 16 L/m of nasal oxygen.  Her weight was stable at 200 pounds.  She had no pallor, cyanosis or jaundice.  Her neck exam revealed no JVD.  Her carotids were 2+ bilaterally.  She had no lower extremity edema.  Cardiac auscultation revealed normal S1 and a normal S2.  No murmur, rub or gallop.  Auscultation of her lungs revealed equal air entry on both sides with bilateral end-expiratory crepitations consistent with her interstitial lung disease.  Her abdomen was soft with normal bowel sounds; no tenderness, no rigidity, no guarding.  She had no focal neurological deficit.        She had a limited echocardiogram on 2/27/2017 which was of poor quality with tachycardia falsely rendering her LVEF looking aroundt 30-35% in contrast to 50-55% on the last study.  Her right ventricle was mildly to moderately dilated with moderately reduced function.  Her estimated PA systolic pressure was 35 mmHg plus the right atrial pressure.       She had cardiopulmonary exercise testing where she walked for 1 minute 30 seconds.  She achieved anaerobic threshold with an RER of 1.16.  She had significant functional limitations.  Her VO2 max was only 7.5 mL/kg/min.  Her blood pressure decreased with exercise.  Her heart rate appropriately increased.  She had significant  desaturation during exercise.  Her O2 pulse was low.  Her VE/VCO2 slope was significantly elevated.  She had reduced breathing. Her cardiopulmonary exercise testing was consistent with both cardiac as well as respiratory limitation.      Her NT-proBNP was elevated at 2976.  Her renal function was unremarkable.  Her CBC was within normal limits.      Her INR was therapeutic at 2.35.     She had a repeat RHC that showed a RA pressure of 2 mm Hg, RV 45/4, PA 45/20 (30), PCWP 5, PA saturation 65%, CO 4.6 l/min, and PVR 5.4 Wood units.      ASSESSMENT AND PLAN:      Ms. Sophia Johnson is a 71-year-old female with a past medical history significant for:     1.  Interstitial lung disease, who is currently listed for a lung transplant.   2.  History of tachycardia-induced cardiomyopathy with a most recent ejection fraction of 30-35% likely due to a. Fib. withRVR.     3.  Atrial fibrillation with RVR and a recent hospitalization and another DCCV.  4.  Pulmonary hypertension secondary to lung disease with progressive course and increasing O2 requirements.   5.  PFO.      Ms. Sophia Johnson is currently not volume overloaded.  Her apparently reduced LVEF is probably due to her atrial fibrillation with rapid ventricular response.  Will continue her on the low dose of lisinopril, metoprolol and Aldactone.  We also recommend her to continue torsemide 10 mg daily.      Atrial fibrillation with recent hospitalization due to worsening dyspnea and RVR.  She is in sinus rhythm.  Her CHADS-VASc score is high.  She is on Coumadin.  Her INR is therapeutic.        She has mild pulmonary hypertension secondary to her lung disease.  Her right ventricle is mildly dilated with a moderately reduced function.  The size and function of her right ventricle have not changed significantly when compared to her echo from January.  Her NT-proBNP, also, is more elevated today likely due to rhythm problem.  Clinically, she does not have any evidence  of significant right ventricular dysfunction. I took the liberty and contacted lung transplant coordinator to report her increased O2 need which will probably upgrade her listing status.  It is very clear that her pulmonary status is dtereiorating rapidly.  We deferred the decision regarding the clinical trial I mentioned during the last visit.       It was a pleasure meeting Ms. Sophia Johnson in our Pulmonary Hypertension Clinic.  I have recommended her to return to clinic in July with me with an echo study.      Patient was discussed with Dr. Martin     Sincerely,     Jesika Montoya DO  Heart failure fellow  [phone]    I have personally seen and examined the patient, and then discussed with Dr. Trent, and agree with the findings and plan in this note. I have reviewed today's vital signs, medications, labs, and imaging.     Will repeat echocardiogram to reassess her LV function after cardioversion.     Sincerely,    Mario Martin MD   [facility] for Pulmonary Hypertension  Section of Advanced Heart Failure   Cardiovascular Division  [facility]   [phone]             Name:     SOPHIA JOHNSON   MRN:      [id number]        Account:      [id number]   :      1945           Service Date: 2017          Mario Martin MD

## 2017-03-20 NOTE — MR AVS SNAPSHOT
After Visit Summary   3/20/2017    Sophia Gonsales    MRN: [id number]           Patient Information     Date Of Birth          1945        Visit Information        Provider Department      3/20/2017 1:30 PM Mario Martin MD [facility]        Today's Diagnoses     Chronic systolic heart failure (H)    -  1    Cough          Care Instructions    You were seen at the [facility] clinic today.  You saw Dr. Martin  Here are your Instructions:    1.  Echo today or April 4/14.  2.  See Dr. Martin back in 3 months.      Lor Mills RN  Nurse Care Coordinator  Office:  [phone] option #1, then #3 & ask for Lor (nurse line)  Fax:  [phone]  After Hours:  [phone]  Appointments:  [phone] option #1, then option #1    Results for SOPHIA GONSALES (MRN [id number]) as of 3/20/2017 14:19   Ref. Range 3/20/2017 13:32   Sodium Latest Ref Range: 133 - 144 mmol/L 141   Potassium Latest Ref Range: 3.4 - 5.3 mmol/L 4.0   Chloride Latest Ref Range: 94 - 109 mmol/L 101   Carbon Dioxide Latest Ref Range: 20 - 32 mmol/L 33 (H)   Urea Nitrogen Latest Ref Range: 7 - 30 mg/dL 8   Creatinine Latest Ref Range: 0.52 - 1.04 mg/dL 1.06 (H)   GFR Estimate Latest Ref Range: >60 mL/min/1.7m2 51 (L)   GFR Estimate If Black Latest Ref Range: >60 mL/min/1.7m2 62   Calcium Latest Ref Range: 8.5 - 10.1 mg/dL 8.9   Anion Gap Latest Ref Range: 3 - 14 mmol/L 7   N-Terminal Pro Bnp Latest Ref Range: 0 - 125 pg/mL 2976 (H)   Glucose Latest Ref Range: 70 - 99 mg/dL 94   WBC Latest Ref Range: 4.0 - 11.0 10e9/L 9.6   Hemoglobin Latest Ref Range: 11.7 - 15.7 g/dL 14.2   Hematocrit Latest Ref Range: 35.0 - 47.0 % 42.9   Platelet Count Latest Ref Range: 150 - 450 10e9/L 263   RBC Count Latest Ref Range: 3.8 - 5.2 10e12/L 4.54   MCV Latest Ref Range: 78 - 100 fl 95   MCH Latest Ref Range: 26.5 - 33.0 pg 31.3   MCHC Latest Ref Range: 31.5 - 36.5 g/dL 33.1   RDW  Latest Ref Range: 10.0 - 15.0 % 13.4                     Follow-ups after your visit        Follow-up notes from your care team     Return in about 3 months (around 6/20/2017) for Dr. Martin with labs before the same day.      Your next 10 appointments already scheduled     Mar 29, 2017  1:00 PM CDT   Ech Complete with UCECHCR2    [facility] ([facility])    [address] [place] [id number] [phone]           1.  Please bring or wear a comfortable two-piece outfit. 2.  You may eat, drink and take your normal medicines. 3.  For any questions that cannot be answered, please contact the ordering physician            Apr 14, 2017  7:45 AM CDT   LAB with  LAB   [facility] ([facility])    [address] [place] [id number] [phone]           Patient must bring picture ID.  Patient should be prepared to give a urine specimen  Please do not eat 10-12 hours before your appointment if you are coming in fasting for labs on lipids, cholesterol, or glucose (sugar).  Pregnant women should follow their Care Team instructions. Water with medications is okay. Do not drink coffee or other fluids.   If you have concerns about taking  your medications, please ask at office or if scheduling via [facility], send a message by clicking on Secure Messaging, Message Your Care Team.            Apr 14, 2017  8:00 AM CDT   SIX MINUTE WALK with UC PFL B   [facility] Pulmonary Function Testing ([facility])    [address] [address]   [phone]            Apr 14, 2017  9:30 AM CDT   SIX MINUTE WALK with UC PFL 6 MINUTE WALK 1    [facility] ([facility])    [address] [address]   [phone]            Apr 14, 2017  9:30 AM CDT   (Arrive by 9:15 AM)   Return Interstitial Lung with Lavinia  Erlin Bowman MD   [facility] ([facility])    [address] [id number] [phone]            Jul 03, 2017  2:00 PM CDT   (Arrive by 1:45 PM)   RETURN HEART FAILURE with Mario Martin MD   [facility] ([facility])    [address] [id number] [phone]              Future tests that were ordered for you today     Open Future Orders        Priority Expected Expires Ordered    Echocardiogram Complete Routine  3/20/2018 3/20/2017            Who to contact     If you have questions or need follow up information about today's clinic visit or your schedule please contact [facility] directly at [phone].  Normal or non-critical lab and imaging results will be communicated to you by [facility], letter or phone within 4 business days after the clinic has received the results. If you do not hear from us within 7 days, please contact the clinic through [facility] or phone. If you have a critical or abnormal lab result, we will notify you by phone as soon as possible.  Submit refill requests through [facility] or call your pharmacy and they will forward the refill request to us. Please allow 3 business days for your refill to be completed.          Additional Information About Your Visit        [facility] Information     [facility] gives you secure access to your electronic health record. If you see a primary care provider, you can also send messages to your care team and make appointments. If you have questions, please call your primary care clinic.  If you do not have a primary care provider, please call [phone] and they will assist you.        Care EveryWhere ID     This is your Care EveryWhere ID. This could be used by other organizations to access your [facility] medical records  [id number]        Your Vitals Were     Pulse Height Pulse Oximetry BMI (Body  "Mass Index)          91 1.753 m (5' 9\") 95% 29.53 kg/m2         Blood Pressure from Last 3 Encounters:   03/20/17 (!) 87/61   03/01/17 94/58   01/11/17 120/79    Weight from Last 3 Encounters:   03/20/17 90.7 kg (200 lb)   02/28/17 95 kg (209 lb 8 oz)   01/11/17 91.2 kg (201 lb)                 Today's Medication Changes          These changes are accurate as of: 3/20/17  2:52 PM.  If you have any questions, ask your nurse or doctor.               Start taking these medicines.        Dose/Directions    guaiFENesin-codeine 100-10 MG/5ML Soln solution   Commonly known as:  CHERATUSSIN AC   Used for:  Cough   Started by:  Mario Martin MD        Dose:  1-2 tsp.   Take 5-10 mLs by mouth every 4 hours as needed for cough   Quantity:  236 mL   Refills:  0         These medicines have changed or have updated prescriptions.        Dose/Directions    * warfarin 7.5 MG tablet   Commonly known as:  COUMADIN   This may have changed:    - when to take this  - additional instructions   Used for:  Atrial fibrillation with RVR (H)        Dose:  7.5 mg   Take 1 tablet (7.5 mg) by mouth daily   Quantity:  30 tablet   Refills:  1       * warfarin 5 MG tablet   Commonly known as:  COUMADIN   This may have changed:  additional instructions   Used for:  Atrial fibrillation with RVR (H)        Take 5 mg daily or as instructed by coumadin clinic nurse   Quantity:  90 tablet   Refills:  3       * Notice:  This list has 2 medication(s) that are the same as other medications prescribed for you. Read the directions carefully, and ask your doctor or other care provider to review them with you.         Where to get your medicines      Some of these will need a paper prescription and others can be bought over the counter.  Ask your nurse if you have questions.     Bring a paper prescription for each of these medications     guaiFENesin-codeine 100-10 MG/5ML Soln solution                Primary Care Provider Office Phone # Fax #    Lesley " Rafaela Christianson -[phone]       [place] [id number] [address] [address]        Thank you!     Thank you for choosing [facility]  for your care. Our goal is always to provide you with excellent care. Hearing back from our patients is one way we can continue to improve our services. Please take a few minutes to complete the written survey that you may receive in the mail after your visit with us. Thank you!             Your Updated Medication List - Protect others around you: Learn how to safely use, store and throw away your medicines at www.disposemymeds.org.          This list is accurate as of: 3/20/17  2:52 PM.  Always use your most recent med list.                   Brand Name Dispense Instructions for use    alendronate 70 MG tablet    FOSAMAX    4 tablet    Take 1 tablet (70 mg) by mouth every 7 days       calcium-vitamin D 600-400 MG-UNIT per tablet    CALTRATE     Take 1 tablet by mouth daily       Chlorpheniramine-Codeine 2-10 MG/5ML Liqd     473 mL    Take 5-10 mLs by mouth every 12 hours as needed       guaiFENesin-codeine 100-10 MG/5ML Soln solution    CHERATUSSIN AC    236 mL    Take 5-10 mLs by mouth every 4 hours as needed for cough       lisinopril 2.5 MG tablet    PRINIVIL/Zestril    90 tablet    Take 1 tablet (2.5 mg) by mouth daily       metoprolol 25 MG 24 hr tablet    TOPROL-XL    90 tablet    TAKE ONE TABLET BY MOUTH ONE TIME DAILY       omeprazole 20 MG CR capsule    priLOSEC    90 capsule    Take 1 capsule (20 mg) by mouth daily       * order for DME     1 Device    Please provide patient with second liquid concentrator for filling smaller tanks.  Patient goes through more oxygen since recent hospitalization and needs a second unit at home.       * order for DME     1 Device    Please provide patient with POC (portable oxygen concentrator).  Patient currently uses 2-3 LPM continuous flow oxygen.       * order for DME     1 Device    [facility]  Please provide new liquid system to meet increased need.  Patient requires 8 liters oxygen via nasal cannula with oximyzer to maintain oxygen saturation above 88% with activity.  This is for lifetime use.       pirfenidone 267 MG capsule    ESBRIET    270 capsule    Take 3 capsules (801 mg) by mouth 3 times daily (with meals)       spironolactone 25 MG tablet    ALDACTONE    90 tablet    TAKE ONE TABLET BY MOUTH EVERY DAY       SUNSCREEN SPF50 Lotn     250 mL    Apply as needed.       torsemide 10 MG tablet    DEMADEX    90 tablet    Take 1 tablet (10 mg) by mouth daily as needed       VITAMIN C PO          * warfarin 7.5 MG tablet    COUMADIN    30 tablet    Take 1 tablet (7.5 mg) by mouth daily       * warfarin 5 MG tablet    COUMADIN    90 tablet    Take 5 mg daily or as instructed by coumadin clinic nurse       * Notice:  This list has 5 medication(s) that are the same as other medications prescribed for you. Read the directions carefully, and ask your doctor or other care provider to review them with you.

## 2017-03-20 NOTE — LETTER
3/20/2017      RE: Sophia Johnson  [address] [id number]       Dear Colleague,    Thank you for the opportunity to participate in the care of your patient, Sophia Johnson, at the [facility]. Please see a copy of my visit note below.    2017             [place] [address]      Lavinia Bowman MD   [facility] Internal Medicine    [address] [id number]      RE: Sophia Johnson   MRN: [id number]   : 1945      Dear Dr. De Anda:      We had the pleasure of seeing Ms. Sophia Johnson in our Advanced Heart Failure and Transplant Clinic.  As you know, she is a 71-year-old female with a past medical history significant for:     1.  Idiopathic pulmonary fibrosis, who is currently listed for lung transplant.   2.  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 She was in our institution from  till 3/1/2017 due to worsening dyspnea and atrial fibrillation with RVR.  3. 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 Johnson returns today after her hospital stay as outlined above.  She has been having a noticeable progression of her exertional shortness of breath and poor energy level.  Normally, she would be able to climb 1 flight of stairs; now she has to stop twice before she finishes her stairs.  It took her only to move from her chair to examination table to see reduction in her SPO2 to low 80's and a jump in her heart rate to >110 bpm.  Her oxygen requirements have also been increasing to more than 10 L/m during her hospitalization but the staff failed to document it.  She is currently on 16 L/m at rest.  She has not had any worsening lower extremity swelling or abdominal distention.  Her weight has been stable.  She has had occasional  lightheadedness and dizziness, but no syncope.  She has chest tightness when carries shopping items with cough.  She has no PND or orthopnea.  I would currently characterize her as NYHA functional class IIIB.      CURRENT MEDICATIONS:   1.  Toprol XL 25 mg daily.   2.  Lisinopril 2.5 mg daily.   3.  Spironolactone 25 mg daily.   4.  Torsemide 10 mg daily.     5.  Pirfenidone 3 capsules 3 times daily.   6.  Alendronate 70 mg daily.   7.  Coumadin as directed.   8.  Prilosec 20 mg daily.   9.  Vitamin D 3000 units daily.   10.  Vitamin C 1 tablet daily.      REVIEW OF SYSTEMS:  A detailed 10 point review of systems was obtained as described in the History of Present Illness.  All other systems were reviewed and are negative.      PHYSICAL EXAMINATION:  She was in no apparent distress while resting.  Her blood pressure was 87/61.  Her pulse rate was 91.  Her respiratory rate was 16.  She was saturating 95% on 16 L/m of nasal oxygen.  Her weight was stable at 200 pounds.  She had no pallor, cyanosis or jaundice.  Her neck exam revealed no JVD.  Her carotids were 2+ bilaterally.  She had no lower extremity edema.  Cardiac auscultation revealed normal S1 and a normal S2.  No murmur, rub or gallop.  Auscultation of her lungs revealed equal air entry on both sides with bilateral end-expiratory crepitations consistent with her interstitial lung disease.  Her abdomen was soft with normal bowel sounds; no tenderness, no rigidity, no guarding.  She had no focal neurological deficit.        She had a limited echocardiogram on 2/27/2017 which was of poor quality with tachycardia falsely rendering her LVEF looking aroundt 30-35% in contrast to 50-55% on the last study.  Her right ventricle was mildly to moderately dilated with moderately reduced function.  Her estimated PA systolic pressure was 35 mmHg plus the right atrial pressure.       She had cardiopulmonary exercise testing where she walked for 1 minute 30 seconds.  She  achieved anaerobic threshold with an RER of 1.16.  She had significant functional limitations.  Her VO2 max was only 7.5 mL/kg/min.  Her blood pressure decreased with exercise.  Her heart rate appropriately increased.  She had significant desaturation during exercise.  Her O2 pulse was low.  Her VE/VCO2 slope was significantly elevated.  She had reduced breathing. Her cardiopulmonary exercise testing was consistent with both cardiac as well as respiratory limitation.      Her NT-proBNP was elevated at 2976.  Her renal function was unremarkable.  Her CBC was within normal limits.      Her INR was therapeutic at 2.35.     She had a repeat RHC that showed a RA pressure of 2 mm Hg, RV 45/4, PA 45/20 (30), PCWP 5, PA saturation 65%, CO 4.6 l/min, and PVR 5.4 Wood units.      ASSESSMENT AND PLAN:      Ms. Sophia Johnson is a 71-year-old female with a past medical history significant for:     1.  Interstitial lung disease, who is currently listed for a lung transplant.   2.  History of tachycardia-induced cardiomyopathy with a most recent ejection fraction of 30-35% likely due to a. Fib. withRVR.     3.  Atrial fibrillation with RVR and a recent hospitalization and another DCCV.  4.  Pulmonary hypertension secondary to lung disease with progressive course and increasing O2 requirements.   5.  PFO.      Ms. Sophia Johnson is currently not volume overloaded.  Her apparently reduced LVEF is probably due to her atrial fibrillation with rapid ventricular response.  Will continue her on the low dose of lisinopril, metoprolol and Aldactone.  We also recommend her to continue torsemide 10 mg daily.      Atrial fibrillation with recent hospitalization due to worsening dyspnea and RVR.  She is in sinus rhythm.  Her CHADS-VASc score is high.  She is on Coumadin.  Her INR is therapeutic.        She has mild pulmonary hypertension secondary to her lung disease.  Her right ventricle is mildly dilated with a moderately reduced  function.  The size and function of her right ventricle have not changed significantly when compared to her echo from January.  Her NT-proBNP, also, is more elevated today likely due to rhythm problem.  Clinically, she does not have any evidence of significant right ventricular dysfunction. I took the liberty and contacted lung transplant coordinator to report her increased O2 need which will probably upgrade her listing status.  It is very clear that her pulmonary status is dtereiorating rapidly.  We deferred the decision regarding the clinical trial I mentioned during the last visit.       It was a pleasure meeting Ms. Sophia Johnson in our Pulmonary Hypertension Clinic.  I have recommended her to return to clinic in July with me with an echo study.      Patient was discussed with Dr. Martin     Sincerely,     Jesika Montoya,   Heart failure fellow  [phone]    I have personally seen and examined the patient, and then discussed with Dr. Trent, and agree with the findings and plan in this note. I have reviewed today's vital signs, medications, labs, and imaging.     Will repeat echocardiogram to reassess her LV function after cardioversion.     Sincerely,    Mario Martin MD   Center for Pulmonary Hypertension  Section of Advanced Heart Failure   Cardiovascular Division  [facility]   [phone]             Name:     SOPHIA JOHNSON   MRN:      [id number]        Account:      [id number]   :      1945           Service Date: 2017

## 2017-03-22 NOTE — PROGRESS NOTES
2017             Chuck De Anda MD   [address] [id number]      Lavinia Bowman MD   [facility] Internal Medicine    [address] [place] [address]      RE: Sophia Johnson   MRN: [id number]   : 1945      Dear Dr. De Anda:      We had the pleasure of seeing Ms. Sophia Johnson in our Advanced Heart Failure and Transplant Clinic.  As you know, she is a 71-year-old female with a past medical history significant for:     1.  Idiopathic pulmonary fibrosis, who is currently listed for lung transplant.   2.  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 She was in our institution from  till 3/1/2017 due to worsening dyspnea and atrial fibrillation with RVR.  3. 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 Johnson returns today after her hospital stay as outlined above.  She has been having a noticeable progression of her exertional shortness of breath and poor energy level.  Normally, she would be able to climb 1 flight of stairs; now she has to stop twice before she finishes her stairs.  It took her only to move from her chair to examination table to see reduction in her SPO2 to low 80's and a jump in her heart rate to >110 bpm.  Her oxygen requirements have also been increasing to more than 10 L/m during her hospitalization but the staff failed to document it.  She is currently on 16 L/m at rest.  She has not had any worsening lower extremity swelling or abdominal distention.  Her weight has been stable.  She has had occasional lightheadedness and dizziness, but no syncope.  She has chest tightness when carries shopping items with cough.  She has no PND or orthopnea.  I would currently characterize her as NYHA functional class IIIB.      CURRENT MEDICATIONS:   1.  Toprol XL 25 mg daily.   2.  Lisinopril 2.5 mg daily.   3.  Spironolactone 25 mg  daily.   4.  Torsemide 10 mg daily.     5.  Pirfenidone 3 capsules 3 times daily.   6.  Alendronate 70 mg daily.   7.  Coumadin as directed.   8.  Prilosec 20 mg daily.   9.  Vitamin D 3000 units daily.   10.  Vitamin C 1 tablet daily.      REVIEW OF SYSTEMS:  A detailed 10 point review of systems was obtained as described in the History of Present Illness.  All other systems were reviewed and are negative.      PHYSICAL EXAMINATION:  She was in no apparent distress while resting.  Her blood pressure was 87/61.  Her pulse rate was 91.  Her respiratory rate was 16.  She was saturating 95% on 16 L/m of nasal oxygen.  Her weight was stable at 200 pounds.  She had no pallor, cyanosis or jaundice.  Her neck exam revealed no JVD.  Her carotids were 2+ bilaterally.  She had no lower extremity edema.  Cardiac auscultation revealed normal S1 and a normal S2.  No murmur, rub or gallop.  Auscultation of her lungs revealed equal air entry on both sides with bilateral end-expiratory crepitations consistent with her interstitial lung disease.  Her abdomen was soft with normal bowel sounds; no tenderness, no rigidity, no guarding.  She had no focal neurological deficit.        She had a limited echocardiogram on 2/27/2017 which was of poor quality with tachycardia falsely rendering her LVEF looking aroundt 30-35% in contrast to 50-55% on the last study.  Her right ventricle was mildly to moderately dilated with moderately reduced function.  Her estimated PA systolic pressure was 35 mmHg plus the right atrial pressure.       She had cardiopulmonary exercise testing where she walked for 1 minute 30 seconds.  She achieved anaerobic threshold with an RER of 1.16.  She had significant functional limitations.  Her VO2 max was only 7.5 mL/kg/min.  Her blood pressure decreased with exercise.  Her heart rate appropriately increased.  She had significant desaturation during exercise.  Her O2 pulse was low.  Her VE/VCO2 slope was  significantly elevated.  She had reduced breathing. Her cardiopulmonary exercise testing was consistent with both cardiac as well as respiratory limitation.      Her NT-proBNP was elevated at 2976.  Her renal function was unremarkable.  Her CBC was within normal limits.      Her INR was therapeutic at 2.35.     She had a repeat RHC that showed a RA pressure of 2 mm Hg, RV 45/4, PA 45/20 (30), PCWP 5, PA saturation 65%, CO 4.6 l/min, and PVR 5.4 Wood units.      ASSESSMENT AND PLAN:      Ms. Sophia Johnson is a 71-year-old female with a past medical history significant for:     1.  Interstitial lung disease, who is currently listed for a lung transplant.   2.  History of tachycardia-induced cardiomyopathy with a most recent ejection fraction of 30-35% likely due to a. Fib. withRVR.     3.  Atrial fibrillation with RVR and a recent hospitalization and another DCCV.  4.  Pulmonary hypertension secondary to lung disease with progressive course and increasing O2 requirements.   5.  PFO.      Ms. Sophia Johnson is currently not volume overloaded.  Her apparently reduced LVEF is probably due to her atrial fibrillation with rapid ventricular response.  Will continue her on the low dose of lisinopril, metoprolol and Aldactone.  We also recommend her to continue torsemide 10 mg daily.      Atrial fibrillation with recent hospitalization due to worsening dyspnea and RVR.  She is in sinus rhythm.  Her CHADS-VASc score is high.  She is on Coumadin.  Her INR is therapeutic.        She has mild pulmonary hypertension secondary to her lung disease.  Her right ventricle is mildly dilated with a moderately reduced function.  The size and function of her right ventricle have not changed significantly when compared to her echo from January.  Her NT-proBNP, also, is more elevated today likely due to rhythm problem.  Clinically, she does not have any evidence of significant right ventricular dysfunction. I took the liberty and  contacted lung transplant coordinator to report her increased O2 need which will probably upgrade her listing status.  It is very clear that her pulmonary status is dtereiorating rapidly.  We deferred the decision regarding the clinical trial I mentioned during the last visit.       It was a pleasure meeting Ms. Sophia Johnson in our Pulmonary Hypertension Clinic.  I have recommended her to return to clinic in July with me with an echo study.      Patient was discussed with Dr. Martin     Sincerely,     Jesika Montoya,   Heart failure fellow  [phone]    I have personally seen and examined the patient, and then discussed with Dr. Trent, and agree with the findings and plan in this note. I have reviewed today's vital signs, medications, labs, and imaging.     Will repeat echocardiogram to reassess her LV function after cardioversion.     Sincerely,    Mario Martin MD   Center for Pulmonary Hypertension  Section of Advanced Heart Failure   Cardiovascular Division  [facility]   [phone]             Name:     SOPHIA JOHNSON   MRN:      [id number]        Account:      [id number]   :      1945           Service Date: 2017

## 2017-04-03 NOTE — TELEPHONE ENCOUNTER
Pat has an LAS score >50 and now is requiring minimum 18-19 liters at rest.  Updated LAS score with 18 liters.  LAS to [phone].

## 2017-04-03 NOTE — TELEPHONE ENCOUNTER
Have reviewed Jessica's current status with Dr. Bowman by phone, and also with Dr. Martin.   Concerned with Jessica's safety at home with current oxygen requirements and desaturation into 80s and 70s with any activity.  Discussed options of admit to hospital for better oxygen access and palliative support.  Dr. Martin believes that her symptoms are likely the progression of her lung disease and not related to her heart.   Contacted Martin to assess thoughts about coming to hospital.  They would like to stay home as long as possible.  They will call 9-1-1 if needed.  They would like to see Dr. Bowman sooner next week if possible.  Confirmed can discuss this on Monday.  Verified that they have contact information for the ILD team.  This writer will not be available the rest of this week.  Discussed have Jessica's portable liquid tank--which goes to 15 liters--available as additive to her current 18-19 liters when she is having trouble recovering after activity.  They will keep the portable tank filled and close by.  Martin is aware that Jessica's chances for a lung donor are slim due to her HLA antibodies, but remain hopeful and would like to do every thing possible to keep that option open.    Martin states Jessica's heart rate has been stable in the 80s-90s. It does not seem to be irregular.  Per further review with Dr. Martin--per his review of echo her PA pressure may be a little higher, RV slightly worse, but not significantly so.  Jessica's symptoms are likely more from her ILD and hypoxia.  Dr. Martin is aware that Jessica's current plan is to stay at home as long as possible, will come in if needed.

## 2017-04-03 NOTE — TELEPHONE ENCOUNTER
Received call from Martin, Jessica's , with worsening oxygen needs.  Jessica is currently using 18 liters at night, 19 liters at rest.  Her liquid oxygen (max 10 liter flow) and concentrator (max 10 liter flow) are Y'd together to enable this liter flow.  Martin is concerned that she has not additional flex at this point.  [facility] is delivering another large liquid tank today for a total of 5 liquid tanks.  Instructed Martin to contact [facility] to request a regulator for the liquid tanks with higher liter flow. Jessica has tried to use oxymizer tubing but finds the nasal prongs uncomfortable.  She has used the oxymask, but does not keep her sats up as well with this.  Confirmed will discuss her status with Dr. Bowman and call Martin back.

## 2017-04-06 NOTE — MR AVS SNAPSHOT
Sophia Johnson   4/6/2017   Anticoagulation Therapy Visit    Description:  71 year old female   Provider:  Ryne Mc, [facility]   Department:  [facility]           INR as of 4/6/2017     Today's INR No new INR was available at the time of this encounter.      Anticoagulation Summary as of 4/6/2017     INR goal 2.0-3.0   Today's INR No new INR was available at the time of this encounter.   Full instructions 7.5 mg on Fri; 5 mg all other days   Next INR check 4/14/2017    Indications   Long-term (current) use of anticoagulants [Z79.01] [Z79.01]  Atrial fibrillation (H) [I48.91]         April 2017 Details    Sun Mon Tue Wed Thu Fri Sat           1                 2               3               4               5               6      5 mg   See details      7      7.5 mg         8      5 mg           9      5 mg         10      5 mg         11      5 mg         12      5 mg         13      5 mg         14            15                 16               17               18               19               20               21               22                 23               24               25               26               27               28               29                 30                      Date Details   04/06 This INR check       Date of next INR:  4/14/2017         How to take your warfarin dose     To take:  5 mg Take 1 of the 5 mg tablets.    To take:  7.5 mg Take 1.5 of the 5 mg tablets.

## 2017-04-06 NOTE — PROGRESS NOTES
Sophia called us today because she forgot to take her warfarin dose last night.  I recommend that she take 5mgs now and then 5mg tonight.

## 2017-04-07 NOTE — TELEPHONE ENCOUNTER
Please see the pulmonary note. Pt is on 22-24 liters of oxygen and O2sat 70's with minimal exertion.    Pt states she is dying, she used to be on xanax, wondering if her PCP could prescribed this for her again. Pt is very nervous and anxious. Pt's  was on the phone, then he requested xanax for his wife or he insisted to talk to on call doctor since the pt could not go to ED because she could not have the right ambulance with high oxygen equipment.    I consulted with Dr. Helton and he called the pt's , was on the phone for a long time. He sent Rx of Atarax to the pharmacy.

## 2017-04-07 NOTE — PROGRESS NOTES
Patient's  called to notify our office that Pat's status continues to decline.  Currently she is using 22 to 24 liters of oxygen and desaturates into the 70's with minimal exertion (i.e. Walking 15 feet to use the restroom).  Martin is trying to decide if he should call 911 to have Pat brought to the hospital, although, he realizes that if Pat is admitted there is a high likelihood that she would not go home.  Martin would like the transplant office updated to see if Pat could get a higher score which could move her up on the list. Plan to update transplant office but informed Martin that she is already quite high on list and it is her HLA antibodies that have been what has precluded Pat from being transplanted.  Martin and Pat will have a discussion regarding potential hospital admission and plan to notify pulmonary if she decides to be admitted.  Martin understands the severity of Jessica's current status.

## 2017-04-10 NOTE — TELEPHONE ENCOUNTER
Reviewed Pat's current status with Dr. Bowman.  Plan to cancel appointment in clinic Friday--unrealistic for Pat to come to clinic with current oxygen needs.  Have placed referral per Dr. Bowman for Home Health visit, report given to [facility].  Contacted Pat to confirm plan for Home Health visit--assess for additional support needs, draw PRA.    Pat said that she is afraid to take the Atarax that was prescribed last Friday for anxiety.  Historically she has had significant dizziness when she took this medication.  She is hopeful that Dr. Bowman would be willing to prescribe alprazolam.  She is also requesting that Dr. Bowman call her.  Confirmed will discuss both with Dr. Bowman.

## 2017-04-10 NOTE — TELEPHONE ENCOUNTER
Contacted Martin and Sophia to assess current status.  Jessica has increasing oxygen needs per note from Friday.    Per Martin, Jessica is currently requiring 24 liters at rest.  They occasionally go to 30 liters for recovery and that is the highest they are capable of going.  Jessica is mostly in a recliner, transfer to / to go 15 feet to bathroom.  With any activity sats can drop to 70s and have gone as low as 50s.  Per Martin she is losing her appetite and has lost about 8 pounds over past few weeks.   They continue to be hopeful that a donor may be found for Jessica.   Jessica has an appointment on Friday that is likely unrealistic--will discuss with Dr. Bowman.  Martin has looked into available transport to the [place]--they would need ambulance transport.  He has identified one service that can provide oxygen to 30 liters.  They continue to prefer to stay home if possible, plan to come in when necessary.   Jessica needs a PRA repeated.  Have discussed her PRA level with Dr. White in Immunology on behalf of verifying that antibodies have been minimized as much as possible-- which they have.  Discussed potentially establishing Home Health support, assessment, with request for lab draws.   Will review with Dr. Bowman.  Martin would agree with this plan.    LAS updated based on patient reported requirement of 24 liters oxygen at rest. LAS to [phone].

## 2017-04-11 NOTE — TELEPHONE ENCOUNTER
Received call from Kori with [facility] requesting orders for Home OT to help with appropriate supplies such as commode, etc.  Provided VO per Dr. Bowman for OT.  They were unable to draw blood today, will try again on Thursday.   Kori also suggested considering Complex Care Team referral which could provide in home physician access.  Jessica continues to request something for anxiety, asking for alprazolam which has worked for her in the past, has not been prescribed by this facility.  Discussed update with Dr. Bowman.  She agrees with Complex Care Team referral, would like that team to assess Jessica for appropriate support for her anxiety.  Unclear why Jessica's symptoms have progressed so rapidly, may consider burst of prednisone.     Confirmed with Pat that she is willing to be seen by the Complex Care Team.   She is.    Confirmed that Dr. Bowman would defer to them to help manage her anxiety after assessing her.  Jessica understands.    Jessica again denies any signs of infection: no fever, increased sputum, sore throat, etc.   She has used prednisone at least once in the past--she thinks it was for her back.  She would like to talk with Dr. Bowman.      Order placed for Complex Care Team referral. Requested assessment and support for anxiety.

## 2017-04-11 NOTE — MR AVS SNAPSHOT
Sophia Johnson   4/11/2017   Anticoagulation Therapy Visit    Description:  71 year old female   Provider:  Claudia Nash, RN   Department:  [facility]           INR as of 4/11/2017     Today's INR 4.7!      Anticoagulation Summary as of 4/11/2017     INR goal 2.0-3.0   Today's INR 4.7!   Full instructions 4/11: Hold; 4/12: Hold; Otherwise 7.5 mg on Fri; 5 mg all other days   Next INR check 4/13/2017    Indications   Long-term (current) use of anticoagulants [Z79.01] [Z79.01]  Atrial fibrillation (H) [I48.91]         April 2017 Details    Sun Mon Tue Wed Thu Fri Sat           1                 2               3               4               5               6               7               8                 9               10               11      Hold   See details      12      Hold         13            14               15                 16               17               18               19               20               21               22                 23               24               25               26               27               28               29                 30                      Date Details   04/11 This INR check       Date of next INR:  4/13/2017         How to take your warfarin dose     To take:  5 mg Take 1 of the 5 mg tablets.    Hold Do not take your warfarin dose. See the Details table to the right for additional instructions.

## 2017-04-11 NOTE — PROGRESS NOTES
ANTICOAGULATION FOLLOW-UP CLINIC VISIT    Patient Name:  Sophia Johnson  Date:  4/11/2017  Contact Type:  Telephone    SUBJECTIVE:     Patient Findings     Positives Change in diet/appetite (appetite has declined considerably), Other complaints (she is dehydrated--worse the last 3 weeks)           OBJECTIVE    INR   Date Value Ref Range Status   04/11/2017 4.7  Final       ASSESSMENT / PLAN  INR assessment SUPRA    Recheck INR In: 2 DAYS    INR Location [facility]      Anticoagulation Summary as of 4/11/2017     INR goal 2.0-3.0   Today's INR 4.7!   Maintenance plan 7.5 mg (5 mg x 1.5) on Fri; 5 mg (5 mg x 1) all other days   Full instructions 4/11: Hold; 4/12: Hold; Otherwise 7.5 mg on Fri; 5 mg all other days   Weekly total 37.5 mg   Plan last modified Bianca Olsen, RN (6/20/2016)   Next INR check 4/13/2017   Priority INR   Target end date     Indications   Long-term (current) use of anticoagulants [Z79.01] [Z79.01]  Atrial fibrillation (H) [I48.91]         Anticoagulation Episode Summary     INR check location     Preferred lab     Send INR reminders to [facility]    Comments       Anticoagulation Care Providers     Provider Role Specialty Phone number    Mario Martin MD Responsible Cardiology [phone]            See the Encounter Report to view Anticoagulation Flowsheet and Dosing Calendar (Go to Encounters tab in chart review, and find the Anticoagulation Therapy Visit)    Spoke with Kori CARVER.  She states Jessica's appetite has declined considerably and she is dehydrated.  She will hold coumadin x 2 days.  Home care sees her again 4/13/17 and will check INR.  Kori will review signs of bleeding with patient and instruct her to be seen urgently if she develops any.    Claudia Nash, RN

## 2017-04-12 NOTE — PROGRESS NOTES
[facility] now requests orders and shares plan of care/discharge summaries for some patients through [employer].  Please REPLY TO THIS MESSAGE in order to give authorization for orders when needed.  This is considered a verbal order, you will still receive a faxed copy of orders for signature.  Thank you for your assistance in improving collaboration for our patients.    SN 2 week 4, 6 PRN  OT 1 DAY 1 Eval  HHA 1 week 1    MD SUMMARY/PLAN OF CARE  Patient is a 71 year old female with end stage lung disease due to IPF on transplant waiting list with recent signficant decline in health status over the last 3 weeks requiring supplemental oxygen at 24 liters, alternates between nasal canula and simple mask. Patient is not able to ambulate, is chair bound due to dyspnea and oxygen desaturation with any activity including speaking. Sleeps in rocking chair and pivot tranfers with assistance to wheelchair to get to bathroom. Patient is dehydrated, admits to be limiting and avoiding fluids to decrease need to use bathroom as transfering process is very taxing. Needs bedside commode. Requires assistance with all cares. Patient has signficant amount of anxiety, has hydroxyzine however refuses to take as she has had in past and did not care for side effects. Has requested from [facility] instead and is waiting to hear back. Patient is depressed related to health issues. Family is supportive, daughter from out of state is present for admsision visit along with spouse Martin and son that lives nearby. INR obtained, result 4.7 with orders received to hold warfarin for next 2 days then recheck INR. Nutrition is poor, has no appetite and the thought of certain foods makes her avoid eating. Lives in large multi level home with supportive  that is providing 24 hour care and assistance, has son that lives nearby that comes daily and friends and neighbors that provide assistance as needed. Patient saw cardiology  at clinic on 3/20 and since return home from that appointment she has been down stairs to main level of home 1 time, this was over this past weekend where son and spouse carried her down in her wheelchair. Vital signs BP 88/64, HR 88, RR 26, T 96.8, SaO2 93 percent on 24 liters via nasal canula after resting and not speaking for several minutes, range on oximeter throughout visit 64-93 percent on continuous flow of 24 liters via 2 nasal canulas. Lung sounds are diminshed throughout, no edema is noted to BLE, denies any GI concerns, has urinary incontinence and wears incontinent pad.  BACKGROUND  Patient has a past medical history including End stage lung disease awaiting transplant, Idiopathic pulmonary fibrosis, chronic systolic heart failure, non ischemic cardiomyopathy, Atrial fibrillation, anxiety, Long term use of anticoagulant, therapeutic drug monitoring, dependent on supplimental oxygen.  ANALYSIS   Patient requires skilled intervention to prevent complications and provide assessment and teaching related to this significant change to her health status.   RECOMMENDATION  SN for disease and symptom management, cardiopulmonary assessment, draw labs and INRs as ordered, teach and assess effects of new and changed medications, teach and assess adequate nutrition and hydration. OT for  energy conservation techniques, relaxation and healing touch techniques, equipment needs, bedside commode, adls, bathroom and shower safety.  HHA for assistance with bathing and personal hygiene cares. Patient would benefit from PT for safe transfers and SW to care planning but has refused SN recommendation of this at this time.       Kori Ibarra RN  [facility]  [phone]

## 2017-04-13 NOTE — MR AVS SNAPSHOT
Sophia Johnson   4/13/2017   Anticoagulation Therapy Visit    Description:  71 year old female   Provider:  Helen Daley, RN   Department:  [facility]           INR as of 4/13/2017     Today's INR 2.40      Anticoagulation Summary as of 4/13/2017     INR goal 2.0-3.0   Today's INR 2.40   Full instructions 7.5 mg on Fri; 5 mg all other days   Next INR check 4/18/2017    Indications   Long-term (current) use of anticoagulants [Z79.01] [Z79.01]  Atrial fibrillation (H) [I48.91]         April 2017 Details    Sun Mon Tue Wed Thu Fri Sat           1                 2               3               4               5               6               7               8                 9               10               11               12               13      5 mg   See details      14      7.5 mg         15      5 mg           16      5 mg         17      5 mg         18            19               20               21               22                 23               24               25               26               27               28               29                 30                      Date Details   04/13 This INR check       Date of next INR:  4/18/2017         How to take your warfarin dose     To take:  5 mg Take 1 of the 5 mg tablets.    To take:  7.5 mg Take 1.5 of the 5 mg tablets.

## 2017-04-13 NOTE — PROGRESS NOTES
ANTICOAGULATION FOLLOW-UP CLINIC VISIT    Patient Name:  Sophia Johnson  Date:  4/13/2017  Contact Type:  Telephone    SUBJECTIVE:     Patient Findings     Positives No Problem Findings           OBJECTIVE    INR   Date Value Ref Range Status   04/13/2017 2.40  Final     Comment:     Home Care        ASSESSMENT / PLAN  INR assessment THER    Recheck INR In: 5 DAYS    INR Location [facility]      Anticoagulation Summary as of 4/13/2017     INR goal 2.0-3.0   Today's INR 2.40   Maintenance plan 7.5 mg (5 mg x 1.5) on Fri; 5 mg (5 mg x 1) all other days   Full instructions 7.5 mg on Fri; 5 mg all other days   Weekly total 37.5 mg   Plan last modified Bianca Olsen RN (6/20/2016)   Next INR check 4/18/2017   Priority INR   Target end date     Indications   Long-term (current) use of anticoagulants [Z79.01] [Z79.01]  Atrial fibrillation (H) [I48.91]         Anticoagulation Episode Summary     INR check location     Preferred lab     Send INR reminders to [facility]    Comments       Anticoagulation Care Providers     Provider Role Specialty Phone number    Mario Martin MD Responsible Cardiology [phone]            See the Encounter Report to view Anticoagulation Flowsheet and Dosing Calendar (Go to Encounters tab in chart review, and find the Anticoagulation Therapy Visit)    Spoke with MEHUL Camarena RN

## 2017-04-14 NOTE — PROGRESS NOTES
Reviewed and updated transplant on-call note.  Updated LAS with most recent RH cath results.  LAS to 69.1873.  PRA has successfully been drawn by Home Health and delivered today to lab.   Home Health visit confirming oxygen use of 24 liters at rest at home has been entered in Epic on 4/12/17.

## 2017-04-18 PROBLEM — J96.01 ACUTE RESPIRATORY FAILURE WITH HYPOXIA (H): Status: ACTIVE | Noted: 2017-01-01

## 2017-04-18 NOTE — ED NOTES
Bed: IN  Expected date: 4/18/17  Expected time:   Means of arrival: Ambulance  Comments:  Sophia Alex hx of IPF on tx list ongoing sob and anxious. On 24-30 liters at home. To [facility] by ambulance.  Recommend try not to intubate, treat with ativan and bipap if needed.  Plan to admit to ICU Dr. Bowman to talk to Dr. Perlman.

## 2017-04-18 NOTE — MR AVS SNAPSHOT
Sophia Johnson   4/18/2017   Anticoagulation Therapy Visit    Description:  71 year old female   Provider:  Claudia Nash, RN   Department:  [facility]           INR as of 4/18/2017     Today's INR 3.0      Anticoagulation Summary as of 4/18/2017     INR goal 2.0-3.0   Today's INR 3.0   Full instructions 4/18: 2.5 mg; Otherwise 7.5 mg on Fri; 5 mg all other days   Next INR check 4/21/2017    Indications   Long-term (current) use of anticoagulants [Z79.01] [Z79.01]  Atrial fibrillation (H) [I48.91]         April 2017 Details    Sun Mon Tue Wed Thu Fri Sat           1                 2               3               4               5               6               7               8                 9               10               11               12               13               14               15                 16               17               18      2.5 mg   See details      19      5 mg         20      5 mg         21            22                 23               24               25               26               27               28               29                 30                      Date Details   04/18 This INR check       Date of next INR:  4/21/2017         How to take your warfarin dose     To take:  2.5 mg Take 0.5 of a 5 mg tablet.    To take:  5 mg Take 1 of the 5 mg tablets.    To take:  7.5 mg Take 1.5 of the 5 mg tablets.

## 2017-04-18 NOTE — PROGRESS NOTES
[facility]  Patient is currently open to home care services with [facility].  The patient is currently receiving RN/PT  services.  [facility]  and team have been notified of patient admission.  [facility] liaison will continue to follow patient during stay.  If appropriate provide orders to resume home care at time of discharge.    Kyleigh Daniels RN, BSN  [facility] Liaison  [phone]

## 2017-04-18 NOTE — ED PROVIDER NOTES
History     Chief Complaint   Patient presents with     Shortness of Breath     The history is provided by the patient, the spouse and medical records.     Sophia Johnson is a 71 year old female with a medical history significant for anxiety, idiopathic pulmonary fibrosis (on 10 L/min chronic supplemental oxygen; on transplant list), secondary pulmonary hypertension,, chronic systolic heart failure, and atrial fibrillation (on Coumadin) who was brought to the emergency department by ambulance for evaluation of worsening shortness of breath. Patient's  relates 4 to 5 days of gradually worsening O2 saturations on home oximeter and increased reliance on supplemental O2. He also notes that the patient has been very anxious and has not been eating or drinking much for the same amount of time. Patient notes some left-sided chest tightness but denies chest pain or lower extremity swelling. Patient's  reports that she has been compliant with all of her medications.  Patient is on the transplant list still awaiting potential transplant.  Patient noted be full code according to .   No leg pain leg swelling no reports of fever.    I have reviewed the Medications, Allergies, Past Medical and Surgical History, and Social History in the [employer] system.    PAST MEDICAL HISTORY:   Past Medical History:   Diagnosis Date     Atrial fibrillation (H) 4/7/2015     Atypical squamous cell changes of undetermined significance (ASCUS) on cervical cytology with positive high risk human papilloma virus (HPV) age 30 & following     Chronic systolic heart failure (H)      Coughing      Fall against object 2/2005    Fell 35 feet     IPF (idiopathic pulmonary fibrosis) (H)     chest CT 2010 showed pulm fibrosis but not diagnostic of IPF; VATS R lung bx 5/2013 +UIP; RAINIER simutuzumab study until 4-2015; pirfenidone started .     LBP (low back pain)      On home oxygen therapy     uses when walking >3 minutes      Pneumonia     interstitial lung disease      Vitamin D deficiency        PAST SURGICAL HISTORY:   Past Surgical History:   Procedure Laterality Date     ANESTHESIA CARDIOVERSION N/A 6/3/2015    Procedure: ANESTHESIA CARDIOVERSION;  Surgeon: GENERIC ANESTHESIA PROVIDER;  Location: [facility] OR     ANESTHESIA CARDIOVERSION N/A 2017    Procedure: ANESTHESIA CARDIOVERSION;  Surgeon: GENERIC ANESTHESIA PROVIDER;  Location: [facility] OR     ARTHROSCOPY KNEE RT/LT      Right     COLPOSCOPY CERVIX, BIOPSY CERVIX, ENDOCERVICAL CURETTAGE, COMBINED  age?    Reported Benign      FOOT SURGERY      L foot tendon     HC ESOPH/GAS REFLUX TEST W NASAL IMPED >1 HR N/A 3/17/2016    Procedure: ESOPHAGEAL IMPEDENCE FUNCTION TEST WITH 24 HOUR PH GREATER THAN 1 HOUR;  Surgeon: Gonzalo Reveles MD;  Location: [facility] GI     LAMINECT/DISCECTOMY, LUMBAR  Oct. 2010    kyphoplasty, vertebroplasty     right wrist surgery       SURGICAL HISTORY OF -       left foot surgery, tendon had , cadaver bone     THORACOSCOPIC WEDGE RESECTION LUNG  2013    Procedure: THORACOSCOPIC WEDGE RESECTION LUNG;  Right Thoracscopic Wedge Resection Anesthesia General with block;  Surgeon: José Peralta MD;  Location: [facility] OR     TRANSPLANT         FAMILY HISTORY:   Family History   Problem Relation Age of Onset     Breast Cancer Mother      CANCER Mother      Breast Cancer     CANCER Father      bone and liver     CEREBROVASCULAR DISEASE Maternal Grandfather        SOCIAL HISTORY:   Social History   Substance Use Topics     Smoking status: Passive Smoke Exposure - Never Smoker     Years: 2.00     Types: Cigarettes     Smokeless tobacco: Never Used      Comment: Smoked in college, a cigarette here and there     Alcohol use No      Comment: 3 drinks a week     Current Discharge Medication List      CONTINUE these medications which have NOT CHANGED    Details   WARFARIN SODIUM PO Take 7.5mg by mouth on  and 5mg on all other days of week.       spironolactone (ALDACTONE) 25 MG tablet Take 1 tablet (25 mg) by mouth daily  Qty: 90 tablet, Refills: 3    Associated Diagnoses: Combined systolic and diastolic heart failure (H)      guaiFENesin-codeine (CHERATUSSIN AC) 100-10 MG/5ML SOLN solution Take 5-10 mLs by mouth every 4 hours as needed for cough  Qty: 236 mL, Refills: 0    Associated Diagnoses: Cough      calcium-vitamin D (CALTRATE) 600-400 MG-UNIT per tablet Take 1 tablet by mouth daily      metoprolol (TOPROL-XL) 25 MG 24 hr tablet TAKE ONE TABLET BY MOUTH ONE TIME DAILY   Qty: 90 tablet, Refills: 3    Associated Diagnoses: Paroxysmal atrial fibrillation (H); Chronic systolic heart failure (H)      pirfenidone (ESBRIET) 267 MG capsule Take 3 capsules (801 mg) by mouth 3 times daily (with meals)  Qty: 270 capsule, Refills: 11    Associated Diagnoses: IPF (idiopathic pulmonary fibrosis) (H)      lisinopril (PRINIVIL,ZESTRIL) 2.5 MG tablet Take 1 tablet (2.5 mg) by mouth daily  Qty: 90 tablet, Refills: 3    Associated Diagnoses: Chronic systolic heart failure (H)      alendronate (FOSAMAX) 70 MG tablet Take 1 tablet (70 mg) by mouth every 7 days  Qty: 4 tablet, Refills: 11    Associated Diagnoses: Compression fracture of T12 vertebra, with routine healing, subsequent encounter; Osteopenia      Ascorbic Acid (VITAMIN C PO) Take 500 mg by mouth daily       omeprazole (PRILOSEC) 20 MG capsule Take 1 capsule (20 mg) by mouth daily  Qty: 90 capsule, Refills: 6    Associated Diagnoses: IPF (idiopathic pulmonary fibrosis) (H); Hiatal hernia      SUNSCREEN SPF50 LOTN Apply as needed.  Qty: 250 mL, Refills: 11    Associated Diagnoses: Sensitivity to sunlight      !! order for DME Patient currently gets oxygen form [facility].  Please provide new liquid system to meet increased need.  Patient requires 8 liters oxygen via nasal cannula with oximyzer to maintain oxygen saturation above 88% with activity.  This is for lifetime use.  Qty: 1 Device, Refills: 0     Associated Diagnoses: ILD (interstitial lung disease) (H); Hypoxia      !! order for DME Please provide patient with POC (portable oxygen concentrator).  Patient currently uses 2-3 LPM continuous flow oxygen.  Qty: 1 Device, Refills: 0    Associated Diagnoses: IPF (idiopathic pulmonary fibrosis) (H); Hypoxia      !! ORDER FOR DME Please provide patient with second liquid concentrator for filling smaller tanks.  Patient goes through more oxygen since recent hospitalization and needs a second unit at home.  Qty: 1 Device, Refills: 0    Associated Diagnoses: IPF (idiopathic pulmonary fibrosis) (H)       !! - Potential duplicate medications found. Please discuss with provider.        Allergies   Allergen Reactions     Vancomycin Other (See Comments), Itching and Visual Disturbance     Flushing, symptoms resolved when rate of infusing slowed     Cyclobenzaprine      Dizziness lasting couple days.     Tape [Adhesive Tape]      Rash with sores     Vicodin [Hydrocodone-Acetaminophen]      Headache       Review of Systems   Constitutional: Positive for activity change (marked desaturation with any activity even going to the bathroom), appetite change and fatigue. Negative for fever.   HENT: Negative for congestion, sinus pressure and sore throat.    Eyes: Negative for redness and visual disturbance.   Respiratory: Positive for cough (occasional), chest tightness (mild left-sided) and shortness of breath. Negative for wheezing.    Cardiovascular: Negative for chest pain and leg swelling.   Gastrointestinal: Negative for abdominal pain, nausea and vomiting.   Genitourinary: Negative for difficulty urinating and flank pain.   Musculoskeletal: Positive for gait problem. Negative for arthralgias and neck stiffness.   Skin: Negative for color change and rash.   Allergic/Immunologic: Negative for immunocompromised state.   Neurological: Positive for weakness. Negative for seizures, syncope and headaches.   Psychiatric/Behavioral:  Positive for decreased concentration and dysphoric mood. Negative for confusion. The patient is nervous/anxious.    All other systems reviewed and are negative.      Physical Exam      Physical Exam   Constitutional: She is oriented to person, place, and time. She appears well-developed and well-nourished. She appears distressed.   Patient here in the ER on O2 by ambulance [place] anxious increasing respiratory effort  present to give information also   HENT:   Head: Normocephalic and atraumatic.   Facial flushing without cyanosis   Eyes: Conjunctivae and EOM are normal. Pupils are equal, round, and reactive to light. No scleral icterus.   Neck: Normal range of motion. Neck supple.   Some accessory muscle movement use but no stridor   Cardiovascular: Regular rhythm.    Sinus tachycardia   Pulmonary/Chest: No stridor. She is in respiratory distress.   Coarse breath sounds bilateral and equal   Abdominal: She exhibits no distension. There is no tenderness.   Musculoskeletal: She exhibits no edema, tenderness or deformity.   Neurological: She is alert and oriented to person, place, and time. She has normal reflexes. No cranial nerve deficit. Coordination normal.   Nonfocal   Skin: Skin is warm and dry. No rash noted. She is not diaphoretic. No erythema. No pallor.   Psychiatric:   Anxiousness   Nursing note and vitals reviewed.      ED Course     ED Course     Procedures       10:37 AM  The patient was seen and examined by Dale Baron MD in Room 2.   I discussed case prior to patient arrival with Dr. Bowman who is patient's pulmonologist.  Recommendations were to treat her for anxiety also BiPAP would be appropriate to try to avoid intubation.    Upon arrival discussed with information per  and also patient patient feeling anxious short of breath.  Assessed urine IV was established labs are drawn.  Portal chest x-ray shows no pneumothorax no effusion.  Chronic interstitial fibrosis changes seen.  Patient's  BNP was 7295  Trop .297  Discussed cardiac findings with .  He knows the patient well and feels this is consistent with pulmonary hypertension interpolar fibrosis.  Laboratory testing sodium 139 potassium 4.1 crit any 0.96 glucose 110 INR 2.25.    White count 11.6 and glucose 16.9 platelets 246.  Lactic acid 1.8    Patient did receive IV Ativan periodically here in the ER to help with her air hunger anxiety which seemed to help somewhat she was given IV fluids in the ER blood pressure maintained.    Discussed with Dr. Perlman will accept the patient patient admitted to ICU family aware and agrees with plan.         EKG Interpretation:      Interpreted by Dale Baron  Time reviewed: 1049  Symptoms at time of EKG: Shortness of breath   Rhythm: Sinus tachycardia  Rate: normal  Axis: normal  Ectopy: none  Conduction: normal  ST Segments/ T Waves: Inferolateral T-wave inversion which appears old.  Q Waves: none  Comparison to prior: Unchanged    Clinical Impression: Sinus tachycardia with inferolateral T-wave inversion which appears to be old          Critical Care time:  was 30 minutes for this patient excluding procedures.               Labs Ordered and Resulted from Time of ED Arrival Up to the Time of Departure from the ED   CBC WITH PLATELETS DIFFERENTIAL - Abnormal; Notable for the following:        Result Value    WBC 11.6 (*)     Hemoglobin 16.9 (*)     Hematocrit 49.5 (*)     Absolute Neutrophil 9.1 (*)     All other components within normal limits   INR - Abnormal; Notable for the following:     INR 2.25 (*)     All other components within normal limits   COMPREHENSIVE METABOLIC PANEL - Abnormal; Notable for the following:     Glucose 110 (*)     GFR Estimate 57 (*)     Protein Total 9.0 (*)     All other components within normal limits   NT PROBNP INPATIENT - Abnormal; Notable for the following:     N-Terminal Pro BNP Inpatient 7295 (*)     All other components within normal limits   TROPONIN  I - Abnormal; Notable for the following:     Troponin I ES 0.297 (*)     All other components within normal limits   ISTAT  GASES LACTATE MARIBEL POCT - Abnormal; Notable for the following:     PO2 Venous 20 (*)     Bicarbonate Venous 29 (*)     Lactic Acid 3.3 (*)     All other components within normal limits   PARTIAL THROMBOPLASTIN TIME   PULSE OXIMETRY NURSING   CARDIAC CONTINUOUS MONITORING   PERIPHERAL IV CATHETER     Results for orders placed or performed during the hospital encounter of 04/18/17   CBC with platelets differential   Result Value Ref Range    WBC 11.6 (H) 4.0 - 11.0 10e9/L    RBC Count 5.18 3.8 - 5.2 10e12/L    Hemoglobin 16.9 (H) 11.7 - 15.7 g/dL    Hematocrit 49.5 (H) 35.0 - 47.0 %    MCV 96 78 - 100 fl    MCH 32.6 26.5 - 33.0 pg    MCHC 34.1 31.5 - 36.5 g/dL    RDW 14.2 10.0 - 15.0 %    Platelet Count 246 150 - 450 10e9/L    Diff Method Automated Method     % Neutrophils 78.5 %    % Lymphocytes 8.9 %    % Monocytes 9.9 %    % Eosinophils 2.1 %    % Basophils 0.4 %    % Immature Granulocytes 0.2 %    Nucleated RBCs 0 0 /100    Absolute Neutrophil 9.1 (H) 1.6 - 8.3 10e9/L    Absolute Lymphocytes 1.0 0.8 - 5.3 10e9/L    Absolute Monocytes 1.1 0.0 - 1.3 10e9/L    Absolute Eosinophils 0.2 0.0 - 0.7 10e9/L    Absolute Basophils 0.1 0.0 - 0.2 10e9/L    Abs Immature Granulocytes 0.0 0 - 0.4 10e9/L    Absolute Nucleated RBC 0.0    INR   Result Value Ref Range    INR 2.25 (H) 0.86 - 1.14   Partial thromboplastin time   Result Value Ref Range    PTT 35 22 - 37 sec   Comprehensive metabolic panel   Result Value Ref Range    Sodium 139 133 - 144 mmol/L    Potassium 4.1 3.4 - 5.3 mmol/L    Chloride 103 94 - 109 mmol/L    Carbon Dioxide 25 20 - 32 mmol/L    Anion Gap 10 3 - 14 mmol/L    Glucose 110 (H) 70 - 99 mg/dL    Urea Nitrogen 12 7 - 30 mg/dL    Creatinine 0.96 0.52 - 1.04 mg/dL    GFR Estimate 57 (L) >60 mL/min/1.7m2    GFR Estimate If Black 69 >60 mL/min/1.7m2    Calcium 8.9 8.5 - 10.1 mg/dL     Bilirubin Total 1.1 0.2 - 1.3 mg/dL    Albumin 3.5 3.4 - 5.0 g/dL    Protein Total 9.0 (H) 6.8 - 8.8 g/dL    Alkaline Phosphatase 73 40 - 150 U/L    ALT 36 0 - 50 U/L    AST 33 0 - 45 U/L   Nt probnp inpatient (BNP)   Result Value Ref Range    N-Terminal Pro BNP Inpatient 7295 (H) 0 - 900 pg/mL   Troponin I   Result Value Ref Range    Troponin I ES 0.297 (HH) 0.000 - 0.045 ug/L   Blood gas arterial   Result Value Ref Range    pH Arterial 7.42 7.35 - 7.45 pH    pCO2 Arterial 37 35 - 45 mm Hg    pO2 Arterial 63 (L) 80 - 105 mm Hg    Bicarbonate Arterial 24 21 - 28 mmol/L    Base Deficit Art 0.4 mmol/L    FIO2 100%    Procalcitonin   Result Value Ref Range    Procalcitonin  ng/ml     <0.05  <0.05 ng/ml  Normal  Recommendation: Very low risk of bacterial infection.   Discourage antibiotics unless strong clinical suspicion for serious infection.     Lactic acid whole blood   Result Value Ref Range    Lactic Acid 1.8 0.7 - 2.1 mmol/L   EKG 12-lead, tracing only   Result Value Ref Range    Interpretation ECG Click View Image link to view waveform and result    EKG 12-lead, complete   Result Value Ref Range    Interpretation ECG Click View Image link to view waveform and result    ISTAT gases lactate tata POCT   Result Value Ref Range    Ph Venous 7.42 7.32 - 7.43 pH    PCO2 Venous 44 40 - 50 mm Hg    PO2 Venous 20 (L) 25 - 47 mm Hg    Bicarbonate Venous 29 (H) 21 - 28 mmol/L    O2 Sat Venous 33 %    Lactic Acid 3.3 (H) 0.7 - 2.1 mmol/L   ABO/Rh type and screen   Result Value Ref Range    ABO O     RH(D)  Pos     Antibody Screen Neg     Test Valid Only At       [facility]    Specimen Expires 04/21/2017        Assessments & Plan (with Medical Decision Making)  71-year-old female with idiopathic Herrera fibrosis with secondary pulmonary hypertension was followed by Dr. Bowman along with Dr. ricks  Patient is been at home has a progressive deterioration last several months and now  finally comes to the ER for admission.  Patient placed on BiPAP maintaining oxygen saturations given IV fluids chest x-ray labs etc. workup patient given Ativan IV to help some anxiety we were trying to avoid any intubation as this would be detrimental to the patient pulmonary status.  Patient was left on BiPAP admitted to the ICU discussed with Dr. Perlman and Dr. Meléndez.  Patient more likely ongoing exacerbations of underlying IPF without sig findings of infections.         I have reviewed the nursing notes.    I have reviewed the findings, diagnosis, plan and need for follow up with the patient.    Current Discharge Medication List          Final diagnoses:   Respiratory failure (H)   Long-term (current) use of anticoagulants [Z79.01]   IPF (idiopathic pulmonary fibrosis) (H)   Anxiety     I, Luan Nieto, am serving as a trained medical scribe to document services personally performed by Dale Baron MD, based on the provider's statements to me.   I, Dale Baron MD, was physically present and have reviewed and verified the accuracy of this note documented by Luan Nieto.  4/18/2017   [facility], [place], EMERGENCY DEPARTMENT  \  This note was created at least in part by the use of dragon voice dictation system. Inadvertent typographical errors may still exist.  Dale Baron MD.         Dale Baron MD  04/18/17 0631

## 2017-04-18 NOTE — TELEPHONE ENCOUNTER
Call Type: Triage Call    Presenting Problem: Caller is triage RN for  Home Health and would  liketo speak to provider at [facility] to obtain lab orders for u/a u/c.  Given  # for paging  so she can page  directly.  Triage Note:  Guideline Title: Information Only Call; No Symptom Triage (Adult)  Recommended Disposition: Provide Information or Advice Only  Original Inclination: Would have called clinic  Override Disposition:  Intended Action: Call PCP/HCP  Physician Contacted: No  General information question; no triage required. Information provided from  approved references or knowledge of organization. ?  YES  Requesting regular office appointment ? NO  Sign(s) or symptom(s) associated with a diagnosed condition or with a new illness  ? NO  Requesting information about provider, services or community resources ? NO  Call back to complete assessment/clarification of information from prior caller to  complete triage ? NO  Requesting information and provider is best resource; no triage required. ? NO  Caller not with patient and is unable to provide clinical information about  patient to facilitate triage. ? NO  Requesting provider information for recently scheduled test, procedure; no triage  required. Needed information not available per approved resources or clinical  experience. ? NO  Requesting information not available per approved reference or clinical  experience; no triage required. ? NO  Requesting information regarding scheduled exam, test or procedure; no triage  required. Information provided from approved resources or clinical experience. ?  NO  Health information question; person denies any symptoms, no triage required.  Information provided from approved references or clinical experience. ? NO  Physician Instructions:  Care Advice:

## 2017-04-18 NOTE — PROGRESS NOTES
ANTICOAGULATION FOLLOW-UP CLINIC VISIT    Patient Name:  Sophia Johnson  Date:  4/18/2017  Contact Type:  Telephone    SUBJECTIVE:     Patient Findings     Positives Change in diet/appetite (very decreased appetite, avoiding fluids because it is too hard to transfer so she can use bathroom), Other complaints (may have UTI, urine specimen obtained today.  Spoke with spouse, Martin--Jessica may be admitted to hospital today )           OBJECTIVE    INR   Date Value Ref Range Status   04/18/2017 3.0  Final       ASSESSMENT / PLAN  INR assessment THER    Recheck INR In: 3 DAYS    INR Location [facility]      Anticoagulation Summary as of 4/18/2017     INR goal 2.0-3.0   Today's INR 3.0   Maintenance plan 7.5 mg (5 mg x 1.5) on Fri; 5 mg (5 mg x 1) all other days   Full instructions 4/18: 2.5 mg; Otherwise 7.5 mg on Fri; 5 mg all other days   Weekly total 37.5 mg   Plan last modified Bianca Olsen RN (6/20/2016)   Next INR check 4/21/2017   Priority INR   Target end date     Indications   Long-term (current) use of anticoagulants [Z79.01] [Z79.01]  Atrial fibrillation (H) [I48.91]         Anticoagulation Episode Summary     INR check location     Preferred lab     [facility]    Comments       Anticoagulation Care Providers     Provider Role Specialty Phone number    Mario Martin MD Responsible Cardiology [phone]            See the Encounter Report to view Anticoagulation Flowsheet and Dosing Calendar (Go to Encounters tab in chart review, and find the Anticoagulation Therapy Visit)    Spoke with Rika CARVER.  He reports Jessica may have a UTI, a urine specimen was sent to lab today.  Asked that they call the [facility] if she is put on any antibiotics.  Rika left the home before calling the [facility].  I called Jessica's  Martin with recommendation.  Martin reports Jessica might be admitted to the hospital today--it is getting too hard to take care of her at home.  He understands if she is  in the hospital, they take over managing her INR/coumadin.    Claudia Nash RN

## 2017-04-18 NOTE — H&P
[facility] History & Physical  Patient: Sophia Johnson   Admission date: 2017  MRN: [id number]  : 1945      ______________________________________________________________________________    ASSESSMENT AND PLAN  Sophia Johnson is a 71 year old woman with history of Idiopathic Pulmonary Fibrosis who is listed for transplant and on 10L of home O2, systolic CHF, afib on coumadin and pulmonary HTN who is admitted for acute on chronic respiratory failure likely 2/2 IPF. Currently stable on bipap but may require intubation.     NEURO/PSYCH  # Sedation/# Pain:   No acute issues    # Anxiety:  --Ativan 1mg q4h PRN     CARDIOVASCULAR  # HFrEF (EF 50-55% on 3/29/17)/Biventricular Dysfunction:  # Increased Right Ventricular Systolic Pressure 2/ IPF:  Nt-pro BNP elevated ~7000. She doesn't seems to be volume overloaded on exam. Per cardiology's last note she is on torsemide 10mg daily but she hasn't taken it in 2 years. Per chart review, her weight is down at 189# and she was 200# a month ago. Elevated Nt-pro BNP likely due to elevated right side heart pressure from worsening IPF.   --Will diurese as needed  --Holding home spironolactone and antihypertensives    # Troponin Elevation  Trop elevated at 0.297. Likely due to demand. EKG shows T wave inversions in multiple leads but has been present on previous EKG. She does not have active chest pain.   --Will trend troponin     # Atrial Fibrillation S/P Cardioversion 2017:   INR therapeutic on admission.   --Monitor INR  --Pharmacy to dose warfarin     # Hypertension  BP soft on admission.   --Holding home lisinopril and metoprolol    PULMONARY  # Pulmonary Hypertension 2/2 IPF:  # Idiopathic Pulmonary Fibrosis:   # Acute on Chronic Hypoxic Respiratory Failure:   Patient is currently listed for transplant but her PRA is 100% and match would be difficult per Dr. Bowman's last note.  She has had increased dyspnea with hypoxia for several days prior to admission.  Currently stable on BiPAP and we had a long discussion about if patient would want to be intubated. She is currently okay to be intubated but would like to avoid. Her current acute on chronic respiratory failure likely 2/2 worsening IPF versus underlying infection. Procal negative.    --Dilaudid 0.2mg q2h PRN for air hunger  --Treat anxiety as above  --Continuous BiPAP, could try HFNC if she is more stable and wants to eat  --Would like to avoid intubation, but patient currently would be okay with it.  --Will touch base with patient's primary pulmonologist Dr. Bowman   --Continue home prifenidone   --Will hold on steroids as her presentation is likely due to progression of IPF vs acute exacerbation     GI  No acute issues.     # Nutrition: NPO for now while on bipap.     RENAL  No acute issues.   --Monitor     # Elevated Lactic Acid- Resolved  3.3 on admission that improved to 1.8 after fluids.    HEME/ONC  No acute issues. Hgb stable.     ENDOCRINE  No acute issues.     INFECTIOUS DISEASE  No evidence of infectious source. Patient has been afebrile. UA negative. CXR did not show significant new focal changes. Procal negative.   --Will consider adding abx if she becomes febrile or decompensates     SKIN/MSK  No acute issues.     Prophylaxis:      - DVT: on warfarin     - GI: n/a   Family: Updated at bedside   Disposition: pending improvement in respiratory symptoms, currently requiring bipap and possibly intubation   Code Status: Full Code     The patient was examined with Dr. Perlman who agrees with the plan.    Armani Mike MD, MPH  Medicine-Pediatrics PGY3  [phone]    =========================================================================  Chief Complaint: hypoxemia, dyspnea    HPI:   Sophia Johnson is a 71 year old woman with history of Idiopathic Pulmonary Fibrosis who is listed for transplant and on 10L of home O2, systolic CHF, afib on coumadin and pulmonary HTN who presents today for increasing O2 needs  at home and shortness of breath. Patient and her  states that she has been requiring more O2 in the last week up to 24-30L at home. She has been feeling more dyspneic and this is causing her to be more anxious. She denies fevers, chills, abdominal pain, edema, nausea, vomiting, dysuria, hematuria, or chest pain. She has chest tightness from difficulty breathing.  Family notes that she seems to be worsening and has not been eating/drinking much.  She has a chronic cough at baseline that is unchanged and non-productive.     In the ED, she was hypoxic and was placed on BiPAP with improvements in her saturations. VBG 7.42/44/20/20 and lactic acid 3.3. CMP unremarkable. CBC showed mild leukocytosis of 11.6. NT pro BNP elevated 7295 and troponin 0.297. EKG unchanged from prior.     ROS: negative except as above in HPI    PMHx:   Past Medical History:   Diagnosis Date     Atrial fibrillation (H) 4/7/2015     Atypical squamous cell changes of undetermined significance (ASCUS) on cervical cytology with positive high risk human papilloma virus (HPV) age 30 & following     Chronic systolic heart failure (H)      Coughing      Fall against object 2/2005    Fell 35 feet     IPF (idiopathic pulmonary fibrosis) (H)     chest CT 2010 showed pulm fibrosis but not diagnostic of IPF; VATS R lung bx 5/2013 +UIP; RAINIER simutuzumab study until 4-2015; pirfenidone started .     LBP (low back pain)      On home oxygen therapy     uses when walking >3 minutes     Pneumonia     interstitial lung disease      Vitamin D deficiency        PSHx:  Past Surgical History:   Procedure Laterality Date     ANESTHESIA CARDIOVERSION N/A 6/3/2015    Procedure: ANESTHESIA CARDIOVERSION;  Surgeon: GENERIC ANESTHESIA PROVIDER;  Location: [facility] OR     ANESTHESIA CARDIOVERSION N/A 2/27/2017    Procedure: ANESTHESIA CARDIOVERSION;  Surgeon: GENERIC ANESTHESIA PROVIDER;  Location: [facility] OR     ARTHROSCOPY KNEE RT/LT      Right     COLPOSCOPY CERVIX,  BIOPSY CERVIX, ENDOCERVICAL CURETTAGE, COMBINED  age?    Reported Benign      FOOT SURGERY      L foot tendon     HC ESOPH/GAS REFLUX TEST W NASAL IMPED >1 HR N/A 3/17/2016    Procedure: ESOPHAGEAL IMPEDENCE FUNCTION TEST WITH 24 HOUR PH GREATER THAN 1 HOUR;  Surgeon: Gonzalo Reveles MD;  Location: [facility]     LAMINECT/DISCECTOMY, LUMBAR  Oct. 2010    kyphoplasty, vertebroplasty     right wrist surgery       SURGICAL HISTORY OF -       left foot surgery, tendon had , cadaver bone     THORACOSCOPIC WEDGE RESECTION LUNG  2013    Procedure: THORACOSCOPIC WEDGE RESECTION LUNG;  Right Thoracscopic Wedge Resection Anesthesia General with block;  Surgeon: José Peralta MD;  Location: [facility] OR     TRANSPLANT        Meds:     No current facility-administered medications on file prior to encounter.   Current Outpatient Prescriptions on File Prior to Encounter:  spironolactone (ALDACTONE) 25 MG tablet Take 1 tablet (25 mg) by mouth daily   guaiFENesin-codeine (CHERATUSSIN AC) 100-10 MG/5ML SOLN solution Take 5-10 mLs by mouth every 4 hours as needed for cough   calcium-vitamin D (CALTRATE) 600-400 MG-UNIT per tablet Take 1 tablet by mouth daily   metoprolol (TOPROL-XL) 25 MG 24 hr tablet TAKE ONE TABLET BY MOUTH ONE TIME DAILY    pirfenidone (ESBRIET) 267 MG capsule Take 3 capsules (801 mg) by mouth 3 times daily (with meals)   lisinopril (PRINIVIL,ZESTRIL) 2.5 MG tablet Take 1 tablet (2.5 mg) by mouth daily   alendronate (FOSAMAX) 70 MG tablet Take 1 tablet (70 mg) by mouth every 7 days   Ascorbic Acid (VITAMIN C PO) Take 500 mg by mouth daily    omeprazole (PRILOSEC) 20 MG capsule Take 1 capsule (20 mg) by mouth daily   [DISCONTINUED] warfarin (COUMADIN) 7.5 MG tablet Take 1 tablet (7.5 mg) by mouth daily Take 7.5 mg on  and 5mg all other days of the week or as directed by coumadin clinic   [DISCONTINUED] spironolactone (ALDACTONE) 25 MG tablet TAKE ONE TABLET BY MOUTH EVERY DAY    [DISCONTINUED] warfarin (COUMADIN) 5 MG tablet Take 5 mg daily or as instructed by coumadin clinic nurse   SUNSCREEN SPF50 LOTN Apply as needed.   order for DME Patient currently gets oxygen form [employer].  Please provide new liquid system to meet increased need.  Patient requires 8 liters oxygen via nasal cannula with oximyzer to maintain oxygen saturation above 88% with activity.  This is for lifetime use.   order for DME Please provide patient with POC (portable oxygen concentrator).  Patient currently uses 2-3 LPM continuous flow oxygen.   ORDER FOR DME Please provide patient with second liquid concentrator for filling smaller tanks.  Patient goes through more oxygen since recent hospitalization and needs a second unit at home.       Allergies:   Allergies   Allergen Reactions     Vancomycin Other (See Comments), Itching and Visual Disturbance     Flushing, symptoms resolved when rate of infusing slowed     Cyclobenzaprine      Dizziness lasting couple days.     Tape [Adhesive Tape]      Rash with sores     Vicodin [Hydrocodone-Acetaminophen]      Headache       FamHx:   Family History   Problem Relation Age of Onset     Breast Cancer Mother      CANCER Mother      Breast Cancer     CANCER Father      bone and liver     CEREBROVASCULAR DISEASE Maternal Grandfather      SocHx:   Social History   Substance Use Topics     Smoking status: Passive Smoke Exposure - Never Smoker     Years: 2.00     Types: Cigarettes     Smokeless tobacco: Never Used      Comment: Smoked in college, a cigarette here and there     Alcohol use No      Comment: 3 drinks a week     OBJECTIVE:  Blood pressure 99/76, temperature 98.5  F (36.9  C), temperature source Axillary, resp. rate 30, weight 85.8 kg (189 lb 2.5 oz), SpO2 94 %.    Ranges:   Temperatures:  Current - Temp: 98.5  F (36.9  C); Max - Temp  Av.9  F (36.6  C)  Min: 97.6  F (36.4  C)  Max: 98.5  F (36.9  C)  Respiration range: Resp  Av.5  Min: 24  Max: 37  Pulse  oximetry range: SpO2  Av.9 %  Min: 76 %  Max: 95 %  Blood pressure range: Systolic (24hrs), Av , Min:91 , Max:124   ; Diastolic (24hrs), Av, Min:31, Max:93     Wt Readings from Last 5 Encounters:   17 85.8 kg (189 lb 2.5 oz)   17 90.7 kg (200 lb)   17 95 kg (209 lb 8 oz)   17 91.2 kg (201 lb)   17 91.2 kg (201 lb)       FiO2 (%): 100 %  Resp: 30  BP - Mean:  [47-99] 47    General: Comfortable on bipap  HEENT: No scleral icterus, bipap in place  Neck: Supple  CV: Tachycardic, regular rhythm, no murmurs, rubs, gallops or extra heart sounds  Resp: occasional crackles, having difficulty taking deep breaths, no wheezes appreciated  Abdomen: +bs, soft, nontender, nondistended, no organomegaly appreciated   Extremities: No clubbing or cyanosis, no LE edema bilaterally, peripheral pulses full   MSK: no active synovitis or joint deformity appreciated  Skin: Warm and dry, no jaundice, rash or concerning lesions  Neurological: Alert and oriented, language fluent. Moves all extremities equally.  Psychological: appropriate mood, initially anxious but improved    DIAGNOSTIC STUDIES  ROUTINE ICU LABS  CMP  Recent Labs  Lab 17  1046      POTASSIUM 4.1   CHLORIDE 103   CO2 25   ANIONGAP 10   *   BUN 12   CR 0.96   GFRESTIMATED 57*   GFRESTBLACK 69   ANJALI 8.9   PROTTOTAL 9.0*   ALBUMIN 3.5   BILITOTAL 1.1   ALKPHOS 73   AST 33   ALT 36     CBC  Recent Labs  Lab 17  1046   WBC 11.6*   RBC 5.18   HGB 16.9*   HCT 49.5*   MCV 96   MCH 32.6   MCHC 34.1   RDW 14.2        INR    Recent Labs  Lab 17  1046 17   INR 2.25* 3.0 2.40     Lactic Acid    Recent Labs  Lab 17  1335 17  1049   LACT 1.8 3.3*     Urine Studies  Recent Labs   Lab Test  17   0845  17   0125  16   1511   LEUKEST  Negative  Negative  Moderate*   NITRITE  Negative  Negative  Negative   WBCU  4*  <1  17*   RBCU  0  1  4*     Arterial Blood  Gas    Recent Labs  Lab 04/18/17  1335   PH 7.42   PCO2 37   PO2 63*   HCO3 24   O2PER 100%     Venous Blood Gas     Recent Labs  Lab 04/18/17  1335 04/18/17  1049   PHV  --  7.42   PCO2V  --  44   PO2V  --  20*   HCO3V  --  29*   O2PER 100%  --      MICROBIOLOGY  N/A    IMAGING  CXR 4/18/17  No new focal findings compared to prior CXRs. Final read pending.         Attending Note:    The patient was seen and examined by me and discussed at length with the resident. I have personally reviewed all labs, vital signs, imaging and culture results from the last 24 hours  I have read and agree with the resident note from today which reflects our joint findings, assessment and plan.    David Perlman, M.D.    Pulmonary, Allergy and Critical Care Medicine  [facility]

## 2017-04-18 NOTE — ED NOTES
Pt has hx of IPF and Afib. Pt normally wears 35LPM O2 (2 nasal cannulas and face mask) at home and maintains O2 sats in mid 90s. Recently sats have dropped to 80s. Family called 911. Upon EMS arrival pt was placed on 25LPM non rebreather mask and O2 sats dropped to 60s. 10LPM O2 NC was added and O2 sats increased to 92%. EMS states when sats drop patient has hard time breathing and begins coughing. Pt otherwise VSS for EMS and lungs were clear on auscultation.

## 2017-04-18 NOTE — PHARMACY-ADMISSION MEDICATION HISTORY
Admission medication history interview status for the 4/18/2017 admission is complete. See Epic admission navigator for allergy information, pharmacy, prior to admission medications and immunization status.     Medication history interview sources:  Patient, , medication bottles, Epic notes, [facility] ([phone])    Changes made to PTA medication list (reason)  Added: NA  Deleted:   -Spironolactone (duplicate)  -Torsemide (pt hasn't taken in 2 years)  -Chlorpheniramine-codeine (pt never picked up because pharmacy unable to obtain product)  -Hydroxyzine (pt hasn't used in >6 months because it causes her anxiety)  Changed:   -Ascorbic acid 500mg daily  -Warfarin 7.5mg on Fridays and 5mg on all other days    Additional medication history information (including reliability of information, actions taken by pharmacist):  -Spoke mostly with  as pt is currently receiving oxygenation.  is an excellent historian. He is familiar with medication names, strengths, and directions. They also brought medication bottles which were consistent with interview.  -Anticoag: Current warfarin dose is 7.5mg on Fridays and 5mg on all other days. Pt takes warfarin in the evenings, her last dose was yesterday. Pt had her INR checked today and her goal is 2-3 per Epic notes.  -Pt takes alendronate every Tuesday morning, but did not take a dose yet today.  -Pt takes pirfenidone (Esbriet) for idiopathic pulmonary fibrosis.  brought in as he knows it is an expensive medication that hospitals often don't have in stock.   -Pt reports she hasn't taken torsemide in 2 years. Per cardiology note on 3/20/17, it was recommended that pt continue torsemide 10mg daily. [facility] doesn't have record of torsemide dating back to 2015.  -Influenza vaccine on 11/2016 per  and [facility].    Prior to Admission medications    Medication Sig Last Dose Taking? Auth Provider   WARFARIN SODIUM PO Take 7.5mg by mouth on Fridays and  5mg on all other days of week. 4/17/2017 at PM Yes Unknown, Entered By History   spironolactone (ALDACTONE) 25 MG tablet Take 1 tablet (25 mg) by mouth daily 4/18/2017 at AM Yes Mario Martin MD   guaiFENesin-codeine (CHERATUSSIN AC) 100-10 MG/5ML SOLN solution Take 5-10 mLs by mouth every 4 hours as needed for cough Past Month at Unknown time Yes Mario Martin MD   calcium-vitamin D (CALTRATE) 600-400 MG-UNIT per tablet Take 1 tablet by mouth daily Past Month at Unknown time Yes Unknown, Entered By History   metoprolol (TOPROL-XL) 25 MG 24 hr tablet TAKE ONE TABLET BY MOUTH ONE TIME DAILY  4/18/2017 at AM Yes Mario Martin MD   pirfenidone (ESBRIET) 267 MG capsule Take 3 capsules (801 mg) by mouth 3 times daily (with meals) 4/17/2017 at PM Yes Lavinia Bowman MD   lisinopril (PRINIVIL,ZESTRIL) 2.5 MG tablet Take 1 tablet (2.5 mg) by mouth daily 4/18/2017 at AM Yes Mario Martin MD   alendronate (FOSAMAX) 70 MG tablet Take 1 tablet (70 mg) by mouth every 7 days 4/11/2017 at AM Yes Lesley Christianson MD   Ascorbic Acid (VITAMIN C PO) Take 500 mg by mouth daily  Past Month at Unknown time Yes Reported, Patient   omeprazole (PRILOSEC) 20 MG capsule Take 1 capsule (20 mg) by mouth daily 4/18/2017 at AM Yes Perlman, David Morris, MD   hydrOXYzine (ATARAX) 25 MG tablet Take 1/2 to 1 full tab po tid prn anxiety More than a month at Unknown time  Dileep Helton MD   SUNSCREEN SPF50 LOTN Apply as needed. Unknown at Unknown time  Lesley Christianson MD   order for DME Patient currently gets oxygen form [facility].  Please provide new liquid system to meet increased need.  Patient requires 8 liters oxygen via nasal cannula with oximyzer to maintain oxygen saturation above 88% with activity.  This is for lifetime use. Unknown at Unknown time  Gracie, Lavinia Erlin, MD   order for DME Please provide patient with POC (portable oxygen concentrator).  Patient currently uses 2-3 LPM continuous flow oxygen.  Unknown at Unknown time  Lavinia Bowman MD   ORDER FOR DME Please provide patient with second liquid concentrator for filling smaller tanks.  Patient goes through more oxygen since recent hospitalization and needs a second unit at home. Unknown at Unknown time  Lavinia Bowman MD     Medication history completed by: Alicia Nj, Pharmacy Student

## 2017-04-18 NOTE — PHARMACY-ANTICOAGULATION SERVICE
Pharmacy Warfarin Consult Note     Pharmacy has been consulted to manage this patient s warfarin therapy.  Indication: Atrial Fibrillation  Therapy Goal: INR 2-2.5  OP Anticoag Clinic: [facility]  Warfarin Prior to Admission: Yes  Warfarin PTA Regimen: Take 7.5 mg by mouth on Fridays and 5 mg on all other days of week  Significant drug interactions: None   Recent documented change in oral intake/nutrition: Yes (Per anticoagulation clinic notes --> change in diet/appetite, very decreased appetite, avoiding fluids because it is too hard to transfer so she can use bathroom)  Dose Comments: Anticoagulation clinic recommneded a dose of 2.5 mg PO x 1 today based on INR=3.0 in clinic, inpatient INR=2.25 so will give a dose of 4 mg which is slightly lower than home dose given acute illness, decreased appetite.    INR   Date Value Ref Range Status   04/18/2017 2.25 (H) 0.86 - 1.14 Final   04/18/2017 3.0  Final       Recommend warfarin 4 mg today.  Pharmacy will monitor Sophia Johnson daily and order warfarin doses to achieve specified goal.      Please contact pharmacy as soon as possible if the warfarin needs to be held for a procedure or if the warfarin goals change.      Mary Ann Packer, PharmD  April 18, 2017

## 2017-04-18 NOTE — PROGRESS NOTES
LAS updated in [facility] based on requiring 100% oxygen at rest with bipap, and using 0 feet as 6MW distance.  Pat has only been able to step/pivot to wheelchair for past many days at home, desaturates rapidly with any activity.   LAS to 81.5.  Will alert on-call team regarding hospital admission.  Have reviewed status with Dr. White in Immunology lab.

## 2017-04-19 NOTE — PLAN OF CARE
Problem: Goal Outcome Summary  Goal: Goal Outcome Summary  D: Admitted to the MICU with respiratory failure on bipap with FiO2 of 100% due to worsening IPF.  IA: A/O X 4. Anxious and tearful. Desats to 69-70% when she removes the mask to cough and takes about 4-5 minutes to recover to >90%. AVSS. Rests well with doses of lorazepam and hydromorphone intermittently.   P: Support as needed. Maintain bipap and assist pt with coughing. Assess and monitor closely.

## 2017-04-19 NOTE — PLAN OF CARE
Problem: Goal Outcome Summary  Goal: Goal Outcome Summary  Outcome: No Change     Pt between BiPAP and HFNC both at 100% with Oxi-plus mask on with HFNC to maintain SpO2 in upper 80's to low 90's. Pt de-saturates with activity/coughing into 60's with slow but steady recovery when resting. With acute de-sat's and activity HR climbs into 120's. PRN ativan given X2 with good reduction in anxiety. Dilaudid X1, pt rested a few hours this afternoon.  Pt tearful, expressing her fear of running out of time before a transplant becomes available. Family supportive at bedside. Steroids started, palliative consult placed. BP remains intermittently low, continuing to hold home metoprolol and lisinopril. Plan to continue to implement POC and closely monitor.

## 2017-04-19 NOTE — PLAN OF CARE
Problem: Goal Outcome Summary  Goal: Goal Outcome Summary  Outcome: No Change  Pt A &O x4. NSR to Sinus Tach with exertion. Frequent PVCs/PACs. Pt on BiPAP at 100% FiO2. Intermittent breaks to High Flow NC at 100% FiO2 and oxymask at 15L. Pt with fine crackles and diminished lungs, intermittent coughing spells, occasional clear/cloudy oral and nasal secretions. SBP soft 80s-90s, MAPs low to 60s, will continue to monitor. Spoke with daughter about next steps in care after BiPAP, aka concerns about what an intubation would look like.     P: Follow up with team about palliative consult to involve in care from beginning. Continue to monitor respiratory status and notify team of any significant change.

## 2017-04-19 NOTE — PROGRESS NOTES
[facility] Progress Note  Patient: Sophia Johnson   Admission date: 2017  MRN: [id number]  : 1945      ______________________________________________________________________________  ASSESSMENT AND PLAN    Sophia Johnson is a 71 year old woman with history of Idiopathic Pulmonary Fibrosis who is listed for transplant and on 10L of home O2, systolic CHF, afib on coumadin and pulmonary HTN who is admitted for acute on chronic respiratory failure likely 2/2 IPF. Currently stable on bipap but may require intubation.     CHANGES TODAY:  --Palliative Care consult  --Touch base with cardiology  --Holding home warfarin  --Solumedrol 125mg x 1  --Echo    NEURO/PSYCH  # Sedation/# Pain:   No acute issues     # Anxiety:  --Ativan 1mg q4h PRN      CARDIOVASCULAR  # HFrEF (EF 50-55% on 3/29/17)/Biventricular Dysfunction:  # Increased Right Ventricular Systolic Pressure  IPF:  Nt-pro BNP elevated ~7000. She doesn't seems to be volume overloaded on exam. Per cardiology's last note she is on torsemide 10mg daily but she hasn't taken it in 2 years. Per chart review, her weight is down at 189# and she was 200# a month ago. Elevated Nt-pro BNP likely due to elevated right side heart pressure from worsening IPF.   --Will diurese as needed  --Holding home spironolactone and antihypertensives  --Repeat ECHO today   --Will touch base with cardiology today. She follows with Dr. Martin.      # Troponin Elevation  Trop elevated at 0.297. Likely due to demand. EKG shows T wave inversions in multiple leads but has been present on previous EKG. She does not have active chest pain and troponin down trended.     # Atrial Fibrillation S/P Cardioversion 2017:   INR therapeutic on admission. Currently in sinus rhythm   --Holding home warfarin right now, will address other anticoagulation in the next few days.      # Hypertension  BP soft on admission.   --Holding home lisinopril and metoprolol     PULMONARY  # Pulmonary  Hypertension 2/2 IPF:  # Idiopathic Pulmonary Fibrosis:   # Acute on Chronic Hypoxic Respiratory Failure:   Patient is currently listed for transplant but her PRA is 100% and match would be difficult per Dr. Bowman's last note. She has had increased dyspnea with hypoxia for several days prior to admission. Currently stable on BiPAP and we had a long discussion about if patient would want to be intubated. She is currently okay to be intubated but would like to avoid. Her current acute on chronic respiratory failure likely 2/2 worsening IPF versus underlying infection. Procal negative.   --Dilaudid 0.2mg q2h PRN for air hunger  --Treat anxiety as above  --Continuous BiPAP, could try HFNC if she is more stable and wants to eat  --Would like to avoid intubation, but patient currently would be okay with it.  --Dr. Bowman will be talking with family today  --Palliative care consult   --Holding home prifenidone   --Will give steroids 125mg x 1 and reassess in AM      GI  No acute issues.      # Nutrition: NPO for now while on bipap. D5 NS MIVF while patient is NPO.      RENAL  No acute issues.   --Monitor      # Elevated Lactic Acid- Resolved  3.3 on admission that improved to 1.8 after fluids.     HEME/ONC  No acute issues. Hgb stable.      ENDOCRINE  No acute issues.      INFECTIOUS DISEASE  No evidence of infectious source. Patient has been afebrile. UA negative. CXR did not show significant new focal changes. Procal negative.   --Will consider adding abx if she becomes febrile or decompensates      SKIN/MSK  No acute issues.      Prophylaxis:   - DVT: holding anticoagulation, patient on warfarin at home  - GI: n/a   Family: Updated at bedside   Disposition: pending improvement in respiratory symptoms, currently requiring bipap and possibly intubation   Code Status: Full Code      The patient was examined with Dr. Perlman who agrees with the plan.     Armani Mike MD, MPH  Medicine-Pediatrics  PGY3  [phone]    =========================================================================  SUBJECTIVE:    Nursing notes reviewed. Brief desat to 40% overnight but she recovered on her own. She desats a lot with removing bipap mask and coughing. She is tolerating bipap. Family feels that the dilaudid is making her too sleepy and prefers ativan. No other acute issues overnight.    OBJECTIVE:  Blood pressure (!) 88/63, temperature 97.9  F (36.6  C), temperature source Axillary, resp. rate 23, weight 85.8 kg (189 lb 2.5 oz), SpO2 92 %.    Ranges:   Temperatures:  Current - Temp: 97.9  F (36.6  C); Max - Temp  Av  F (36.7  C)  Min: 97.6  F (36.4  C)  Max: 98.5  F (36.9  C)  Respiration range: Resp  Av.3  Min: 14  Max: 37  Pulse oximetry range: SpO2  Av.5 %  Min: 76 %  Max: 98 %  Blood pressure range: Systolic (24hrs), Av , Min:80 , Max:124   ; Diastolic (24hrs), Av, Min:31, Max:93    I/O last 3 completed shifts:  In: 1395 [I.V.:1395]  Out: 125 [Urine:125]  Wt Readings from Last 5 Encounters:   17 85.8 kg (189 lb 2.5 oz)   17 90.7 kg (200 lb)   17 95 kg (209 lb 8 oz)   17 91.2 kg (201 lb)   17 91.2 kg (201 lb)       FiO2 (%): 100 %  Resp: 23  BP - Mean:  [47-99] 69    General: Comfortable on bipap, but gets short of breath easily and desats with removing the mask or talking too much   HEENT: No scleral icterus, bipap in place  Neck: Supple  CV: Tachycardic, regular rhythm, no murmurs, rubs, gallops or extra heart sounds  Resp: occassional crackles at bases, decreased air movement at bases, having difficulty taking deep breaths, no wheezes appreciated  Abdomen: +bs, soft, nontender, nondistended, no organomegaly appreciated   Extremities: No clubbing or cyanosis, no LE edema bilaterally, peripheral pulses full   MSK: No active synovitis or joint deformity appreciated  Skin: Warm and dry, no jaundice, rash or concerning lesions  Neurological: Alert and oriented,  language fluent. Moves all extremities equally.      DIAGNOSTIC STUDIES  ROUTINE ICU LABS  CMP  Recent Labs  Lab 04/19/17  0507 04/18/17  1046    139   POTASSIUM 3.9 4.1   CHLORIDE 108 103   CO2 28 25   ANIONGAP 6 10   * 110*   BUN 11 12   CR 0.96 0.96   GFRESTIMATED 57* 57*   GFRESTBLACK 69 69   ANJALI 7.8* 8.9   PROTTOTAL 6.4* 9.0*   ALBUMIN 2.6* 3.5   BILITOTAL 2.0* 1.1   ALKPHOS 55 73   AST 30 33   ALT 27 36     CBC    Recent Labs  Lab 04/18/17  1046   WBC 11.6*   RBC 5.18   HGB 16.9*   HCT 49.5*   MCV 96   MCH 32.6   MCHC 34.1   RDW 14.2        INR  Recent Labs  Lab 04/19/17  0507 04/18/17  1046 04/18/17 04/13/17   INR 3.13* 2.25* 3.0 2.40     Lactic Acid    Recent Labs  Lab 04/18/17  1335 04/18/17  1049   LACT 1.8 3.3*     Arterial Blood Gas  Recent Labs  Lab 04/19/17  0626 04/18/17  1335   PH 7.40 7.42   PCO2 42 37   PO2 56* 63*   HCO3 26 24   O2PER 21 100%     Venous Blood Gas     Recent Labs  Lab 04/19/17  0626 04/18/17  1335 04/18/17  1049   PHV  --   --  7.42   PCO2V  --   --  44   PO2V  --   --  20*   HCO3V  --   --  29*   O2PER 21 100%  --        MICROBIOLOGY  Significant culture results:  None    IMAGING  No new imaging         Attending Note:    The patient was seen and examined by me and discussed at length with the resident. I have personally reviewed all labs, vital signs, imaging and culture results from the last 24 hours  I have read and agree with the resident note from today which reflects our joint findings, assessment and plan.    David Perlman, M.D.    Pulmonary, Allergy and Critical Care Medicine  [facility]

## 2017-04-20 NOTE — CONSULTS
[facility]  Palliative Care Consultation         Sophia Johnson MRN# [id number]   Age: 71 year old YOB: 1945   Date of Admission: 4/18/2017    Reason for consult: Goals of care  Patient and family support       Requesting physician/service: Armani Mike MD  MICU       Recommendations        I met with Jessica and her , Martin, today to introduce the Palliative Care service. We discussed her severe pulmonary fibrosis, which has resulted in her being listed for lung transplant. They understand Jessica has many antibodies and there are only 1-2% of people who have lungs she would match with, making a transplant unlikely for her. This has resulted in a great deal of unknown, which both Jessica and Martin identify as stressful and emotionally draining.     Jessica was tearful while speaking about her disease process, and identified many losses it has caused in her life. Her daughter works for [employer], which allows them to travel for free, but she has required too much oxygen for this to be feasible this last year. She identifies having a very supportive family. She takes comfort in her Rastafari kassy and expressed wishes for additional spiritual support.    Goals of Care:  -Continue full restorative medical treatment, in hopes of getting a lung transplant    Coping/Support:   -Involve Palliative    -Involve Palliative SW for additional psychosocial support    Constipation: Reports small, hard stools.   -Consider scheduling Senna 2 tabs BID and making Miralax q day prn available     Disease Process/es & Symptoms     Idiopathic Pulmonary Fibrosis on transplant list  Shortness of breath (SOB) requiring bipap or high flow  Anxiety related to SOB and disease progression, using Lorazepam  Constipation     Support/Coping     How do you make sense of this? Struggling to make sense of her medical situation  Are you Yarsani? Spiritual? Not so much? Yes, Renato  Are you at peace? Not  "discussed  What are your concerns, medical and non-medical, that are not being addressed? None identified  Who are your \"go-to\" people when you need support? , daughter  Plan for psychosocial/spiritual support/follow-up from palliative team: Will discuss case during palliative interdisciplinary team rounds and involve LICSW and  as needed.     Decision-Making & Goals of Care     Discussion/counseling today about prognosis/goals of care/decisions:   Yes, see discussion above  Patient has a completed health care directive available in the chart (Y/N): no  Health care agent (only if patient has an available, complete HCD): n/a  There is no POLST form on file for this patient  Code Status: Full code   Patient has decision-making capacity (Y/N): yes    Coordination of Care     Findings & plan of care discussed with: MICU  Follow-up plan from palliative team: will continue to follow  Thank you for involving us in the patient's care.     DAWNA Lamas CNP  Palliative Care Consult Team  Pager: [phone]    70 minutes spent with patient, with >50% counseling and in care coordination.          Chief Complaint     \"I need new lungs\"         History of Present Illness     History obtained from: chart review and MICU team    Sophia Johnson is a 71 year old woman with a history of Idiopathic Pulmonary Fibrosis (IPF), Systolic CHF, Atrial fib (on coumadin) and Pulmonary HT (PAH). She uses 10 L O2 at home and is listed for transplant. She was admitted to [facility] on 4/18 for increasing oxygen needs at home (24-30L). She is currently stable on bipap, but is requiring 100% FiO2. Palliative Care was consulted to assist with goals of care.           Past Medical History:   This patient  has a past medical history of Atrial fibrillation (H) (4/7/2015); Atypical squamous cell changes of undetermined significance (ASCUS) on cervical cytology with positive high risk human papilloma virus (HPV) (age 30 & following); " Chronic systolic heart failure (H); Coughing; Fall against object (2/2005); IPF (idiopathic pulmonary fibrosis) (H); LBP (low back pain); On home oxygen therapy; Pneumonia; and Vitamin D deficiency.           Past Surgical History:   This patient  has a past surgical history that includes arthroscopy knee rt/lt; surgical history of - (2010); laminect/discectomy, lumbar (Oct. 2010); right wrist surgery; Colposcopy cervix, biopsy cervix, endocervical curettage, combined (age?); Foot surgery; Thoracoscopic wedge resection lung (5/8/2013); Anesthesia cardioversion (N/A, 6/3/2015); ESOPH/GAS REFLUX TEST W NASAL IMPED >1 HR (N/A, 3/17/2016); transplant; and Anesthesia cardioversion (N/A, 2/27/2017).            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           Allergies:   This patient is allergic to vancomycin; cyclobenzaprine; tape [adhesive tape]; and vicodin [hydrocodone-acetaminophen].           Medications:   I have reviewed this patient's medication profile and medications during this hospitalization.    Hydromorphone 0.2 mg IV q2h prn--x2  Lorazepam 1 mg IV q4h prn--x4          Review of Systems:   The comprehensive review of systems is negative other than noted here and in the HPI.    Palliative Symptom Review (0=no symptom/no concern, 1=mild, 2=moderate, 3=severe):      Pain: 0      Fatigue: 2      Nausea: 0      Constipation: 2      Shortness of Breath: 2-3          Physical  Exam:   Vitals were reviewed  Temp: 97.4  F (36.3  C) Temp src: Axillary BP: 106/71   Heart Rate: 89 Resp: 19 SpO2: 94 % O2 Device: High Flow Nasal Cannula (HFNC) (plus oxymask ) Oxygen Delivery: 15 LPM  Constitutional: Pleasant female, seen in hospital bed. Ill appearing, but in no acute distress.  Head: Normocephalic. Atraumatic. Face symmetrical. PERRL. EOMI. Mucous membranes dry.   Extremities: Warm and well perfused.   Pulm: Labored breathing. O2 saturations dropped to 54% during visit, requiring bipap instead of HFNC  Musculoskeletal: MISTRY.   Skin: Warm and dry. No concerning rashes or lesions on exposed areas.   Neuropsych: A/O x 4. Cranial Nerves ll-Xll appear grossly intact. Speech clear. Memory and insight intact. Tearful at times. Mood moderately depressed.          Data Reviewed:     ROUTINE ICU LABS (Last four results)  CMP  Recent Labs  Lab 04/20/17 0427 04/19/17  1550 04/19/17  0507 04/18/17  1046     --  142 139   POTASSIUM 4.1 4.0 3.9 4.1   CHLORIDE 108  --  108 103   CO2 23  --  28 25   ANIONGAP 8  --  6 10   *  --  106* 110*   BUN 7  --  11 12   CR 0.67  --  0.96 0.96   GFRESTIMATED 87  --  57* 57*   GFRESTBLACK >90African American GFR Calc  --  69 69   ANJALI 7.9*  --  7.8* 8.9   MAG  --  1.9 1.8  --    PROTTOTAL  --   --  6.4* 9.0*   ALBUMIN  --   --  2.6* 3.5   BILITOTAL  --   --  2.0* 1.1   ALKPHOS  --   --  55 73   AST  --   --  30 33   ALT  --   --  27 36     CBC  Recent Labs  Lab 04/20/17 0427 04/18/17  1046   WBC 6.7 11.6*   RBC 3.89 5.18   HGB 12.2 16.9*   HCT 37.5 49.5*   MCV 96 96   MCH 31.4 32.6   MCHC 32.5 34.1   RDW 14.1 14.2    246     INR  Recent Labs  Lab 04/20/17 0427 04/19/17  0507 04/18/17  1046 04/18/17   INR 3.46* 3.13* 2.25* 3.0     Arterial Blood Gas  Recent Labs  Lab 04/20/17  0427 04/19/17  0626 04/18/17  1335   PH  --  7.40 7.42   PCO2  --  42 37   PO2  --  56* 63*   HCO3  --  26 24   O2PER 100 21 100%

## 2017-04-20 NOTE — PROGRESS NOTES
[facility] Progress Note  Patient: Sophia Johnson   Admission date: 2017  MRN: [id number]  : 1945      ______________________________________________________________________________  ASSESSMENT AND PLAN    Sophia Johnson is a 71 year old woman with history of Idiopathic Pulmonary Fibrosis who is listed for transplant and on 10L of home O2, systolic CHF, afib on coumadin and pulmonary HTN who is admitted for acute on chronic respiratory failure likely 2/2 IPF. Currently stable on bipap but may require intubation.      CHANGES TODAY:  --Palliative Care will see today   --Solumedrol 125mg x 1  --Touched base with cardiology, no recs at this time. Cardiology team will try to let Dr. Martin know that patient is here.   --Ok to PO       NEURO/PSYCH  # Sedation/# Pain:   No acute issues      # Anxiety:  --Ativan 1mg q4h PRN       CARDIOVASCULAR  # HFrEF (EF 50-55% on 3/29/17)/Biventricular Dysfunction:  # Increased Right Ventricular Systolic Pressure / IPF:  Nt-pro BNP elevated ~7000. She doesn't seems to be volume overloaded on exam. Per cardiology's last note she is on torsemide 10mg daily but she hasn't taken it in 2 years. Per chart review, her weight is down at 189# and she was 200# a month ago. Elevated Nt-pro BNP likely due to elevated right side heart pressure from worsening IPF.  Echo  this admission showed no significant changes from recent echo.   --Will diurese as needed  --Holding home spironolactone and antihypertensives      # Troponin Elevation  Trop elevated at 0.297. Likely due to demand. EKG shows T wave inversions in multiple leads but has been present on previous EKG. She does not have active chest pain and troponin down trended.      # Atrial Fibrillation S/P Cardioversion 2017:   INR therapeutic on admission. Currently in sinus rhythm   --Holding home warfarin right now, will address other anticoagulation in the next few days.       # Hypertension  BP soft on admission.    --Holding home lisinopril and metoprolol      PULMONARY  # Pulmonary Hypertension 2/2 IPF:  # Idiopathic Pulmonary Fibrosis:   # Acute on Chronic Hypoxic Respiratory Failure:   Patient is currently listed for transplant but her PRA is 100% and match would be difficult per Dr. Bowman's last note. She has had increased dyspnea with hypoxia for several days prior to admission. Currently stable on BiPAP and we had a long discussion about if patient would want to be intubated. She is currently okay to be intubated but would like to avoid. Her current acute on chronic respiratory failure likely 2/2 worsening IPF versus underlying infection. Procal negative.   --Dilaudid 0.2mg q2h PRN for air hunger  --Treat anxiety as above  --Continuous BiPAP, HFNC as able   --Would like to avoid intubation, but patient currently would be okay with it.  --Dr. Bowman has been by to see family   --Palliative care consult   --Holding home prifenidone   --Will give steroids 125mg x 1 and continue to reassess   --Palliative care consulted, appreciate recommendations       GI  No acute issues.       # Nutrition: Regular diet as able.       RENAL  No acute issues.   --Monitor       # Elevated Lactic Acid- Resolved  3.3 on admission that improved to 1.8 after fluids.      HEME/ONC  No acute issues. Hgb stable.       ENDOCRINE  No acute issues.       INFECTIOUS DISEASE  No evidence of infectious source. Patient has been afebrile. UA negative. CXR did not show significant new focal changes. Procal negative.   --Will consider adding abx if she becomes febrile or decompensates       SKIN/MSK  No acute issues.       Prophylaxis:   - DVT: holding anticoagulation, patient on warfarin at home  - GI: n/a   Family: Updated at bedside   Disposition: pending improvement in respiratory symptoms, currently requiring bipap and possibly intubation   Code Status: Full Code       The patient was examined with Dr. Perlman who agrees with the plan.      Armani Mike MD,  MPH  Medicine-Pediatrics PGY3  [phone]    =========================================================================  SUBJECTIVE:    Nursing notes reviewed.  Had an overall good night. She was on HFNC briefly multiple times throughout the day yesterday. She desats often to 70-80s with coughing and activity.     OBJECTIVE:  Blood pressure 92/70, temperature 97.6  F (36.4  C), temperature source Axillary, resp. rate 18, weight 85.8 kg (189 lb 2.5 oz), SpO2 92 %.    Ranges:   Temperatures:  Current - Temp: 97.6  F (36.4  C); Max - Temp  Av  F (36.7  C)  Min: 97.4  F (36.3  C)  Max: 98.6  F (37  C)  Respiration range: Resp  Av.5  Min: 17  Max: 47  Pulse oximetry range: SpO2  Av.2 %  Min: 63 %  Max: 95 %  Blood pressure range: Systolic (24hrs), Av , Min:85 , Max:110   ; Diastolic (24hrs), Av, Min:64, Max:79    I/O last 3 completed shifts:  In: 3000 [I.V.:3000]  Out: 200 [Urine:200]  Wt Readings from Last 5 Encounters:   17 85.8 kg (189 lb 2.5 oz)   17 90.7 kg (200 lb)   17 95 kg (209 lb 8 oz)   17 91.2 kg (201 lb)   17 91.2 kg (201 lb)       FiO2 (%): 100 % (12/10)  Resp: 18  BP - Mean:  [68-91] 79    General: NAD, bipap on   HEENT: Sclera anicteric, bipap on  CV: RRR, no murmurs, rubs, gallops or extra heart sounds  Resp: CTAB, moving air well with bipap, no accessory muscle use   Abdomen: +bs, soft, nontender, nondistended, no organomegaly appreciated   Extremities: No clubbing or cyanosis, no LE edema bilaterally, peripheral pulses full   MSK: no active synovitis or joint deformity appreciated  Skin: Warm and dry, no jaundice, rash or concerning lesions  Neurological: Alert and oriented, moving extremities equally     DIAGNOSTIC STUDIES  ROUTINE ICU LABS  CMP  Recent Labs  Lab 17  0427 17  1550 17  0507 17  1046     --  142 139   POTASSIUM 4.1 4.0 3.9 4.1   CHLORIDE 108  --  108 103   CO2 23  --  28 25   ANIONGAP 8  --  6 10   GLC  181*  --  106* 110*   BUN 7  --  11 12   CR 0.67  --  0.96 0.96   GFRESTIMATED 87  --  57* 57*   GFRESTBLACK >90African American GFR Calc  --  69 69   ANJALI 7.9*  --  7.8* 8.9   MAG  --  1.9 1.8  --    PROTTOTAL  --   --  6.4* 9.0*   ALBUMIN  --   --  2.6* 3.5   BILITOTAL  --   --  2.0* 1.1   ALKPHOS  --   --  55 73   AST  --   --  30 33   ALT  --   --  27 36     CBC  Recent Labs  Lab 04/20/17  0427 04/18/17  1046   WBC 6.7 11.6*   RBC 3.89 5.18   HGB 12.2 16.9*   HCT 37.5 49.5*   MCV 96 96   MCH 31.4 32.6   MCHC 32.5 34.1   RDW 14.1 14.2    246     INR  Recent Labs  Lab 04/20/17  0427 04/19/17  0507 04/18/17  1046 04/18/17   INR 3.46* 3.13* 2.25* 3.0     Lactic Acid  Recent Labs  Lab 04/18/17  1335 04/18/17  1049   LACT 1.8 3.3*     Arterial Blood Gas  Recent Labs  Lab 04/20/17  0427 04/19/17  0626 04/18/17  1335   PH  --  7.40 7.42   PCO2  --  42 37   PO2  --  56* 63*   HCO3  --  26 24   O2PER 100 21 100%     Venous Blood Gas   Recent Labs  Lab 04/20/17  0427 04/19/17  0626 04/18/17  1335 04/18/17  1049   PHV 7.42  --   --  7.42   PCO2V 35*  --   --  44   PO2V 74*  --   --  20*   HCO3V 22  --   --  29*   O2PER 100 21 100%  --        MICROBIOLOGY  None     IMAGING  No new        Attending Note:    The patient was seen and examined by me and discussed at length with the resident. I have personally reviewed all labs, vital signs, imaging and culture results from the last 24 hours  I have read and agree with the resident note from today which reflects our joint findings, assessment and plan.    David Perlman, M.D.    Pulmonary, Allergy and Critical Care Medicine  [facility]

## 2017-04-20 NOTE — PLAN OF CARE
Problem: Individualization  Goal: Patient Preferences  D/I/A: Pt between BiPAP 100% 12/10, and HFNC 100% with oxyplus mask 15L. SPO2 at rest 94-96%. Desats to 70-80s with activity and coughing. P: Will continue to monitor.

## 2017-04-20 NOTE — CONSULTS
Cardiology  April 20, 2017    Sophia Johnson is a 71 year old female with history of IPF who is listed for lung tx, afib with biventricular systolic dysfunction probably due to tachycardia induced cardiomyopathy with EF 50-55%, recent admission 2/25/17-3/1 due to dyspnea and afib with RVR, and PH secondary to her ILD. She is readmitted with worsening dyspnea and oxygen demands.     The case was discussed with Dr. Martin who did evaluate the patient as an outpatient 3/20/17; we would not adjust her medical therapy at present. We agree with the plan to resuscitate her from her recent lack of PO, judicious use of IV fluids given her PAH, and reintroducing her PTA meds as deemed reasonable by the primary team.     Ultimately, with IPF, this patient needs lung transplant.     It was discussed with the primary team that this does not require a formal cardiology consult.Thank you for involving us in this patient's care.     She had a repeat RHC 1/11/17:  RA pressure of 2 mm Hg  RV 45/4  PA 45/20 (30)  PCWP 5  PA saturation 65%   CO 4.6 l/min  PVR 5.4 Wood units.     CURRENT MEDICATIONS:   1. Toprol XL 25 mg daily.   2. Lisinopril 2.5 mg daily.   3. Spironolactone 25 mg daily.   4. Torsemide 10 mg daily.   5. Pirfenidone 3 capsules 3 times daily.   6. Alendronate 70 mg daily.   7. Coumadin as directed.   8. Prilosec 20 mg daily.   9. Vitamin D 3000 units daily.   10. Vitamin C 1 tablet daily.         Karthik Wagner PA-C  [facility] Cardiology Consult Team  Pager [phone] or [phone]

## 2017-04-20 NOTE — PROGRESS NOTES
"CLINICAL NUTRITION SERVICES - ASSESSMENT NOTE     Nutrition Prescription    RECOMMENDATIONS FOR MDs/PROVIDERS TO ORDER:  Offer any PO meds with Ensure or other calorie-containing beverage.     Malnutrition Status:    Non-severe malnutrition in the context of acute-on-chronic illness.     Recommendations already ordered by Registered Dietitian (RD):  Ensure Plus once daily, to help optimize oral intake.     Future/Additional Recommendations:  If pt becomes intubated, would initiate TF. Recommend TwoCal HN @ 15 mL/hr and adv by 10 mL q8h until @ goal rate of 45 ml/hr to provide 2160 kcal (30 kcal/kg adj wt), 91 g PRO (1.3 g/kg adj wt), 756 mL H2O, 237 g CHO and 5 g Fiber daily. This will meet kcal and protein needs.   With TF, also order daily multivit/min (Certavite) supplement to help meet micronutrient needs.        REASON FOR ASSESSMENT  Sophia Johnson is a 71 year old female assessed by the dietitian for Interdisciplinary Rounds    NUTRITION HISTORY  Pt is scared about the near future, so did not ask too many questions today. Pt doesn't have a lot of PO meds she needs to take, which is good. She has an appetite but is eating a small amount d/t SOB and anxiety. She typically eats 2 meals per day. Pt was seen by our outpatient RD for her lung transplant eval in March 2016. At that time she was trying to maintain muscle mass with workouts and trying to lose a few lbs for transplant.    CURRENT NUTRITION ORDERS  Diet: Regular  Intake/Tolerance: tolerating PO, likely not eating adequately.    LABS  Labs reviewed    MEDICATIONS  Medications reviewed    ANTHROPOMETRICS  Height:   Ht Readings from Last 2 Encounters:   03/20/17 1.753 m (5' 9\")   02/25/17 1.753 m (5' 9\")     Most Recent Weight: 85.8 kg (189 lb 2.5 oz)    IBW: 66 kg  BMI: 27.9 kg/m^2 - Overweight BMI 25-29.9 (weight meets criteria for lung transplant)  Weight History:   Wt Readings from Last 10 Encounters:   04/18/17 85.8 kg (189 lb 2.5 oz)   03/20/17 " 90.7 kg (200 lb)   02/28/17 95 kg (209 lb 8 oz)   01/11/17 91.2 kg (201 lb)   01/09/17 91.2 kg (201 lb)   01/05/17 89.8 kg (198 lb)   09/22/16 89.8 kg (198 lb)   09/07/16 89.8 kg (198 lb)   08/02/16 92.5 kg (204 lb)   06/24/16 91.9 kg (202 lb 8 oz)     Weight is down 5.4 kg over the past 3 months (= 6% wt loss over the past 3 months)  Dosing Weight: 71 kg (adj wt)    ASSESSED NUTRITION NEEDS  Estimated Energy Needs: 6025-9268 kcals/day (25 - 30 kcals/kg)  Justification: Maintenance  Estimated Protein Needs:  g/day (1.2 - 1.5 grams of pro/kg)  Justification: Increased needs for Pre-op  Estimated Fluid Needs: Per provider pending fluid status    PHYSICAL FINDINGS  See malnutrition section below.  Appears somewhat cachectic.     MALNUTRITION  % Intake: < 75% for >/= 3 months   % Weight Loss: Up to 7.5% in 3 months  Subcutaneous Fat Loss: None observed  Muscle Loss: Scapular bone, Thoracic region (clavicle, acromium bone, deltoid, trapezius, pectoral), Upper arm (bicep, tricep) and Lower arm (forearm): mild- did not assess LE  Fluid Accumulation/Edema: Does not meet criteria  Malnutrition Diagnosis: Non-severe malnutrition in the context of acute-on-chronic illness.     NUTRITION DIAGNOSIS  Inadequate oral intake related to SOB, anorexia with hypermetabolism of lung dz as evidenced by pt lost approx 5.4 kg (6% of previous weight) over the past 3 months.      INTERVENTIONS  Implementation  Nutrition Education: See education flowsheet   Medical food supplement therapy - ensure plus once daily    Goals  Patient to consume % of nutritionally adequate meal trays TID, or the equivalent with supplements/snacks.     Monitoring/Evaluation  Progress toward goals will be monitored and evaluated per protocol.    Bianca Garcia, RANDI, LD  ([facility] dietitian, pgr- [phone])

## 2017-04-20 NOTE — PROGRESS NOTES
SPIRITUAL HEALTH SERVICES  SPIRITUAL ASSESSMENT Progress Note (Palliative Focus)  [facility] ([place]) 4C    REFERRAL SOURCE: Patient requested hospital  via palliative intake    Shared a reflective conversation with Sophia's spouse at bedside, Sophia was sleeping. Spouse affirmed that she would like a visit with a  but tomorrow would be a better time.     I will follow up with Sophia tomorrow. I will inform the palliative  of care provided.    Jaja Matt  Chaplain Resident  Pager [phone]

## 2017-04-20 NOTE — PLAN OF CARE
Problem: Goal Outcome Summary  Goal: Goal Outcome Summary  Outcome: Declining  D:  Pt with IPF continues to require FIO2 at 100% awaiting lung transplant.  I/A:  On BIPAP,15/10 with FIO2 of 100%, with Intermittent breaks (HFNC w/oxyplus at 15 L); on BIPAP most of the shift, coarse lung sounds may episodes of oxygen de-saturation as low as 73 with recovery at least 5 mins.  All other vitals have been status.  P:  Continue to monitor respiratory status closely, notify the team of any changes.

## 2017-04-21 NOTE — PROGRESS NOTES
"SPIRITUAL HEALTH SERVICES  SPIRITUAL ASSESSMENT Progress Note  [facility] ([place]) 4C     REFERRAL SOURCE: Nurse called for clarification, returned to follow-up    Pat's nurse called to alert me that Pat's spouse, Martin, had understood me to say that \"she is off the transplant list.\" I followed up and spoke with Martin, Pat and Son and apologized for the perceived miscommunication, assuring them that I have no knowledge about the transplant wait-list. Martin explored how Pat \"desperatly needs to continue to be able to hope for this.\"     PLAN: Spiritual health services remains available to Pat and family as she remains hospitalized.     Jaja Matt  Chaplain Resident  Pager [phone]  "

## 2017-04-21 NOTE — PROGRESS NOTES
LAS update: status change  Current oxygen use:   100%  Assisted ventilation required?  Yes, mechanical ventilation  LAS score updated in [facility].  LAS to 84.3876  Confirmed score with  Martin.

## 2017-04-21 NOTE — PROGRESS NOTES
[facility]  Patient: Sophia Johnson   Admission date: 2017  MRN: [id number]  : 1945      ______________________________________________________________________________  ASSESSMENT AND PLAN  Sophia Johnson is a 71 year old woman with history of Idiopathic Pulmonary Fibrosis who is listed for transplant and on 10L of home O2, systolic CHF, afib on coumadin and pulmonary HTN who is admitted for acute on chronic respiratory failure likely 2/2 IPF. Currently stable on bipap but may require intubation.       CHANGES TODAY:   --Solumedrol 60mg x 5 days   --DC'ed IV fluids  --Will tolerate sats in the 80s       NEURO/PSYCH  # Sedation/# Pain:   No acute issues      # Anxiety:  --Ativan 1mg q4h PRN       CARDIOVASCULAR  # HFrEF (EF 50-55% on 3/29/17)/Biventricular Dysfunction:  # Increased Right Ventricular Systolic Pressure 2/ IPF:  Nt-pro BNP elevated ~7000. She doesn't seems to be volume overloaded on exam. Per cardiology's last note she is on torsemide 10mg daily but she hasn't taken it in 2 years. Per chart review, her weight is down at 189# and she was 200# a month ago. Elevated Nt-pro BNP likely due to elevated right side heart pressure from worsening IPF. Echo  this admission showed no significant changes from recent echo.   --Will diurese as needed  --Holding home spironolactone and antihypertensives      # Troponin Elevation  Trop elevated at 0.297. Likely due to demand. EKG shows T wave inversions in multiple leads but has been present on previous EKG. She does not have active chest pain and troponin down trended.      # Atrial Fibrillation S/P Cardioversion 2017:   INR therapeutic on admission. Currently in sinus rhythm   --Holding home warfarin right now, will address other anticoagulation in the next few days.       # Hypertension  BP soft on admission.   --Holding home lisinopril and metoprolol      PULMONARY  # Pulmonary Hypertension 2/2 IPF:  # Idiopathic Pulmonary  Fibrosis:   # Acute on Chronic Hypoxic Respiratory Failure:   Patient is currently listed for transplant but her PRA is 100% and match would be difficult per Dr. Bowman's last note. She has had increased dyspnea with hypoxia for several days prior to admission. Currently stable on BiPAP and we had a long discussion about if patient would want to be intubated. She is currently okay to be intubated but would like to avoid. Her current acute on chronic respiratory failure likely 2/2 worsening IPF versus underlying infection. Procal negative.   --Dilaudid 0.2mg q2h PRN or morphine 1-2mg q2h PRN for air hunger  --Treat anxiety as above  --Continuous BiPAP, HFNC as able   --Would like to avoid intubation, but patient currently would be okay with it.  --Dr. Bowman has been by to see family   --Palliative care consulted, appreciate recommendations   --Holding home prifenidone   --Solumedrol 60mg IV x 5 days   --If she is intubated for > 7 days then she will no longer be on the transplant list       GI  No acute issues.       # Nutrition: Regular diet as able.       RENAL  No acute issues.   --Monitor       # Elevated Lactic Acid- Resolved  3.3 on admission that improved to 1.8 after fluids.      HEME/ONC  No acute issues. Hgb stable.       ENDOCRINE  No acute issues.       INFECTIOUS DISEASE  No evidence of infectious source. Patient has been afebrile. UA negative. CXR did not show significant new focal changes. Procal negative.   --Will consider adding abx if she becomes febrile or decompensates       SKIN/MSK  No acute issues.       Prophylaxis:   - DVT: holding anticoagulation, patient on warfarin at home  - GI: n/a   Family: Updated at bedside   Disposition: pending improvement in respiratory symptoms, currently requiring bipap and possibly intubation   Code Status: Full Code - will need to continue to address       The patient was examined with Dr. Perlman who agrees with the plan.      Armani Mike MD, MPH  Medicine-Pediatrics  "[id number]  [phone]    =========================================================================  SUBJECTIVE:    Nursing notes reviewed.  Frequent desats overnight. She received IV lasix x 1. Morphine also given to see if it is better than morphine but she didn't note a difference. She is quite anxious at times, but family is also anxious as well and tearful. She continues to feel like she is not getting much air.     OBJECTIVE:  Blood pressure 120/83, temperature 97.9  F (36.6  C), temperature source Axillary, resp. rate 26, height 1.753 m (5' 9.02\"), weight 85.8 kg (189 lb 2.5 oz), SpO2 (!) 85 %.    Ranges:   Temperatures:  Current - Temp: 97.9  F (36.6  C); Max - Temp  Av  F (36.7  C)  Min: 97.8  F (36.6  C)  Max: 98.4  F (36.9  C)  Respiration range: Resp  Av.6  Min: 17  Max: 53  Pulse oximetry range: SpO2  Av.2 %  Min: 71 %  Max: 94 %  Blood pressure range: Systolic (24hrs), Av , Min:97 , Max:120   ; Diastolic (24hrs), Av, Min:69, Max:95    I/O last 3 completed shifts:  In: 1965 [P.O.:590; I.V.:1375]  Out: 1400 [Urine:1400]  Wt Readings from Last 5 Encounters:   17 85.8 kg (189 lb 2.5 oz)   17 90.7 kg (200 lb)   17 95 kg (209 lb 8 oz)   17 91.2 kg (201 lb)   17 91.2 kg (201 lb)     FiO2 (%): 100 %  Resp: 26  BP - Mean:  [] 94    General: NAD, bipap on   HEENT: Sclera anicteric, bipap on  CV: RRR, no murmurs, rubs, gallops or extra heart sounds  Resp: CTAB, moving air well with bipap, no accessory muscle use, breathing seems labored without bipap, desats frequently when talking too much or bipap off  Abdomen: +bs, soft, nontender, nondistended, no organomegaly appreciated   Extremities: No clubbing or cyanosis, no LE edema bilaterally, peripheral pulses full   Skin: Warm and dry, no jaundice, rash or concerning lesions  Neurological: Alert and oriented, moving extremities equally     DIAGNOSTIC STUDIES  ROUTINE ICU LABS  CMP  Recent Labs  Lab " 04/20/17  0427 04/19/17  1550 04/19/17  0507 04/18/17  1046     --  142 139   POTASSIUM 4.1 4.0 3.9 4.1   CHLORIDE 108  --  108 103   CO2 23  --  28 25   ANIONGAP 8  --  6 10   *  --  106* 110*   BUN 7  --  11 12   CR 0.67  --  0.96 0.96   GFRESTIMATED 87  --  57* 57*   GFRESTBLACK >90African American GFR Calc  --  69 69   ANJALI 7.9*  --  7.8* 8.9   MAG  --  1.9 1.8  --    PROTTOTAL  --   --  6.4* 9.0*   ALBUMIN  --   --  2.6* 3.5   BILITOTAL  --   --  2.0* 1.1   ALKPHOS  --   --  55 73   AST  --   --  30 33   ALT  --   --  27 36     CBC  Recent Labs  Lab 04/20/17 0427 04/18/17  1046   WBC 6.7 11.6*   RBC 3.89 5.18   HGB 12.2 16.9*   HCT 37.5 49.5*   MCV 96 96   MCH 31.4 32.6   MCHC 32.5 34.1   RDW 14.1 14.2    246     INR  Recent Labs  Lab 04/21/17 0404 04/20/17 0427 04/19/17  0507 04/18/17  1046   INR 2.41* 3.46* 3.13* 2.25*     Lactic Acid  Recent Labs  Lab 04/18/17  1335 04/18/17  1049   LACT 1.8 3.3*     Arterial Blood Gas  Recent Labs  Lab 04/21/17 0404 04/20/17 2132 04/20/17 0427 04/19/17  0626 04/18/17  1335   PH  --  7.42  --  7.40 7.42   PCO2  --  35  --  42 37   PO2  --  45*  --  56* 63*   HCO3  --  23  --  26 24   O2PER 100 100 100 21 100%     Venous Blood Gas   Recent Labs  Lab 04/21/17  0404 04/20/17 2132 04/20/17  0427 04/19/17  0626  04/18/17  1049   PHV 7.36  --  7.42  --   --  7.42   PCO2V 44  --  35*  --   --  44   PO2V 70*  --  74*  --   --  20*   HCO3V 25  --  22  --   --  29*   O2PER 100 100 100 21  < >  --    < > = values in this interval not displayed.    MICROBIOLOGY  n/a    IMAGING  CXR 4/21   IMPRESSION:   1. Minimal improvement in perihilar and left retrocardiac opacities,  which may represent atelectasis or pneumonia.  2. Chronic reticular interstitial opacities and fibrosis.        Critical Care Services Progress Note:     Sophia Johnson remains critically ill with hypoxic respiratory failure     I personally examined and evaluated the patient today.   The  patient s prognosis today is grave  I have evaluated all laboratory values and imaging studies from the past 24 hours.  Key findings and decisions made today included Pt with advanced IPF possoble acute exacerbation vs. Progression of disease.  Had been on high-flow/BiPAP but later in the afternoon became more hypoxic, only able to maintain sats of 78-80, discussed with family, pt intubated by anaesthesia with improvement in oxygenation, prognosis poor unless she get's lung transplant but has high PRAs.  I personally managed the ventilator, sedation and nutritional status.   Consults ongoing and ordered are Palliative care  Procedures that will happen today are: endotracheal intubation  All treatments were placed at my direction.  I formulated today s plan with the house staff team or resident(s) and agree with the findings and plan in the associated note.       The above plans and care have been discussed with family and all questions and concerns were addressed.     I spent a total of 60 minutes (excluding procedure time) personally providing and directing critical care services at the bedside and on the critical care unit for Sophia Johnson.        David Morris Perlman

## 2017-04-21 NOTE — PROGRESS NOTES
[facility], [place]  Procedure Note           Intubation:       Sophia Johnson  MRN# [id number]   April 21, 2017, 3:49 PM Indication: Respiratory failure           Patient intubated at: April 21, 2017, 3:50 PM   Patient and family informed of: Why intubation was required  Possible length of time endotracheal tube will remain in place  Communication impairment due to the endotracheal tube  Discomfort caused by the endotracheal tube   Informed consent: Obtained   Cervical spine: Was not stabilized during the procedure   Sedative medication: Was administered during the procedure   Technique used: Visualization with laryngoscope   Endotracheal tube size: 7.5 cm with cuff   Number of attempts: 1   Placement confirmed by: Auscultation of bilateral breath sounds  Visualization of bilateral chest wall rise  End-tidal CO2 monitor   ET tube repositioning: Was not performed   Tube secured at: 23 cm @ lip      This procedure was performed with some difficulty and she tolerated the procedure fairly well with desaturation and slow but gradual recovery.      Recorded by Conor Kelly, RRT

## 2017-04-21 NOTE — ANESTHESIA PROCEDURE NOTES
ANESTHESIOLOGY RESIDENT/CRNA INTUBATION NOTE  Indication for intubation: respiratory insufficiency.  Provider Ordering Intubation: PERLMAN, DAVID C  History regarding the most recent potassium obtained: Yes  History regarding renal failure obtained: Yes  History of presence or absence of CVA/stroke was obtained: Yes  History of presence or absence of NM disorder obtained: Yes  Post Intubation: ETT secured, Primary/ICU team to review CXR, No apparent complications, Vent settings by primary/ICU team, Sedation to be ordered by primary/ICU team and Report given to primary nurse and/or team

## 2017-04-21 NOTE — PROGRESS NOTES
[facility], [place]   Palliative Care Daily Progress Note          Recommendations, Patient/Family Counseling & Coordination     No new recommendations.    I followed up with Jessica and Martin today, after an introduction to Palliative Care yesterday. Jessica was on bipap during my visit, and fairly sleepy. Martin said that Jessica is doing about the same as yesterday; she is working very hard to breathe, which doesn't leave her much energy left for other things. There was also a son at the bedside. Family appears to be coping well.     -Ongoing goals of care conversations are appropriate.   -Palliative Care will continue to follow for additional psychosocial support.        Thank you for the opportunity to continue to participate in the care of this patient and family.  Please feel free to contact on-call palliative provider with any emergent needs.  We can be reached via team pager [phone] (answered 8-4:30 Monday-Friday); after-hours answering service ([phone]).    DAWNA Lamas CNP  Palliative Care Consult Team  Pager: [phone]    15 minutes spent with patient, with >50% counseling and in care coordination.        Assessment      1) Diagnoses & symptoms:        Idiopathic Pulmonary Fibrosis on transplant list  Shortness of breath (SOB) requiring bipap or high flow  Anxiety related to SOB and disease progression, using Lorazepam  Constipation     2)  Psychosocial/Spiritual Needs:   Ongoing: Will discuss case during palliative interdisciplinary team rounds and involve LICSW and  as needed.              Interval History:   Remains in ICU, on Bipap. On transplant list.            Review of Systems:   Palliative Symptom Review (0=no symptom/no concern, 1=mild, 2=moderate, 3=severe):      Pain: 0      Fatigue: 2      Anxiety: 0 (with prn Lorazepam)      Drowsiness: 2      Shortness of Breath: 2          Medications:   I have reviewed this patient's medication profile and  medications given in past 24 hours.           Physical Exam:   Vitals were reviewed  Temp: 97.9  F (36.6  C) Temp src: Axillary BP: 120/83   Heart Rate: 123 Resp: 26 SpO2: (!) 85 % O2 Device: BiPAP/CPAP Oxygen Delivery: 15 LPM  Constitutional: Seen resting in bed. Ill appearing, but in no acute distress.  Head: Bipap mask in place. PERRL.   Extremities: Warm and well perfused.   Pulm: Slightly labored breathing, on bipap.  Skin: Warm and dry. No concerning rashes or lesions on exposed areas.   Neuropsych: A/O x 4. Speech clear. Memory and insight intact.            Data Reviewed:   ROUTINE ICU LABS (Last four results)  CMP  Recent Labs  Lab 04/20/17  0427 04/19/17  1550 04/19/17  0507 04/18/17  1046     --  142 139   POTASSIUM 4.1 4.0 3.9 4.1   CHLORIDE 108  --  108 103   CO2 23  --  28 25   ANIONGAP 8  --  6 10   *  --  106* 110*   BUN 7  --  11 12   CR 0.67  --  0.96 0.96   GFRESTIMATED 87  --  57* 57*   GFRESTBLACK >90African American GFR Calc  --  69 69   ANJLAI 7.9*  --  7.8* 8.9   MAG  --  1.9 1.8  --    PROTTOTAL  --   --  6.4* 9.0*   ALBUMIN  --   --  2.6* 3.5   BILITOTAL  --   --  2.0* 1.1   ALKPHOS  --   --  55 73   AST  --   --  30 33   ALT  --   --  27 36     CBC  Recent Labs  Lab 04/20/17  0427 04/18/17  1046   WBC 6.7 11.6*   RBC 3.89 5.18   HGB 12.2 16.9*   HCT 37.5 49.5*   MCV 96 96   MCH 31.4 32.6   MCHC 32.5 34.1   RDW 14.1 14.2    246     INR  Recent Labs  Lab 04/21/17  0404 04/20/17  0427 04/19/17  0507 04/18/17  1046   INR 2.41* 3.46* 3.13* 2.25*     Arterial Blood Gas  Recent Labs  Lab 04/21/17  0404 04/20/17  2132 04/20/17  0427 04/19/17  0626 04/18/17  1335   PH  --  7.42  --  7.40 7.42   PCO2  --  35  --  42 37   PO2  --  45*  --  56* 63*   HCO3  --  23  --  26 24   O2PER 100 100 100 21 100%

## 2017-04-21 NOTE — PLAN OF CARE
Problem: Individualization  Goal: Patient Preferences  D/I/A: Pt desatted to 82% with activity. Pt anxious and tearful, prn dilaudid and ativan given without effect. Pt encouraged to focus on slow deep breathing and keep BiPAP on. Unable to maintain SPO2 above 85% on 100% BiPAP and c/o of shortness of breath. RT at bedside. BiPAP settings increased to 16/12. Resident updated on patient condition. ABG drawn and CXR done. Pt continues to be alert and oriented with SPO2 80s. Lasix 10 mg given per orders. Straight cath x2. 850 cc UOP this shift. Prn morphine added for air hunger. Pt maintained on BiPAP 100% for rest of the night. Ativan and morphine given for anxiety and air hunger. Pt able to fall asleep after majority of family left. SPO2 increased to low 90s at rest, but continues to desat to 80s with activity. Large BM x1. Occasional unsustained runs of afib 120-140s noted. P: Will continue to monitor.

## 2017-04-22 NOTE — PROGRESS NOTES
[facility]  Patient: Sophia Johnson   Admission date: 2017  MRN: [id number]  : 1945      ______________________________________________________________________________  ASSESSMENT AND PLAN  Sophia Johnson is a 71 year old woman with history of Idiopathic Pulmonary Fibrosis who is listed for transplant and on 10L of home O2, systolic CHF, afib on coumadin and pulmonary HTN who is admitted for acute on chronic respiratory failure likely 2/2 IPF. Intubated on .      CHANGES TODAY:   --Intubated since yesterday  --Start tube feeds      NEURO/PSYCH  # Sedation/# Pain:   No acute issues      # Anxiety:  --Ativan 1mg q4h PRN       CARDIOVASCULAR  # HFrEF (EF 50-55% on 3/29/17)/Biventricular Dysfunction:  # Increased Right Ventricular Systolic Pressure  IPF:  Nt-pro BNP elevated ~7000. She doesn't seems to be volume overloaded on exam. Per cardiology's last note she is on torsemide 10mg daily but she hasn't taken it in 2 years. Per chart review, her weight is down at 189# and she was 200# a month ago. Elevated Nt-pro BNP likely due to elevated right side heart pressure from worsening IPF. Echo  this admission showed no significant changes from recent echo.   --Will diurese as needed  --Holding home spironolactone and antihypertensives      # Troponin Elevation  Trop elevated at 0.297. Likely due to demand. EKG shows T wave inversions in multiple leads but has been present on previous EKG. She does not have active chest pain and troponin down trended.      # Atrial Fibrillation S/P Cardioversion 2017:   INR therapeutic on admission. Currently in sinus rhythm   --Holding home warfarin right now, will address other anticoagulation in the next few days.       # Hypertension  BP soft on admission.   --Holding home lisinopril and metoprolol      PULMONARY  # Pulmonary Hypertension / IPF:  # Idiopathic Pulmonary Fibrosis:   # Acute on Chronic Hypoxic Respiratory Failure:   Patient is  currently listed for transplant but her PRA is 100% and match would be difficult per Dr. Bowman's last note. She has had increased dyspnea with hypoxia for several days prior to admission. Her current acute on chronic respiratory failure likely 2/2 worsening IPF versus underlying infection. Procal negative. On 4/22, patient with hypoxia with sats in the 70s. Discussed with patient and family regarding goals of care and elected to non-urgently intubate on 4/22. On full strength Flolan.  --Wean vent and Flolan as tolerated  --Dilaudid 0.2mg q2h PRN or morphine 1-2mg q2h PRN  --Treat anxiety as above  --Palliative care consulted, appreciate recommendations   --Holding home prifenidone   --Solumedrol 60mg IV x 5 days (4/21-4/25)  > Goals of care discussion: Patient and family did participate in goals of care discussion. Patient indicated that she was not ready to die. Due to increasing hypoxia, patient was intubated. Patient and family are aware that if patient is intubated for > 7 days then she will no longer be on the transplant list.          GI  No acute issues.       # Nutrition: Initiate tube feeds through OG      RENAL  No acute issues.   --Monitor       # Elevated Lactic Acid- Resolved  3.3 on admission that improved to 1.8 after fluids.      HEME/ONC  No acute issues. Hgb stable.       ENDOCRINE  No acute issues.       INFECTIOUS DISEASE  No evidence of infectious source. Patient has been afebrile. UA negative. CXR did not show significant new focal changes. Procal negative.   --Will consider adding abx if she becomes febrile or decompensates       SKIN/MSK  No acute issues.       Prophylaxis:   - DVT: holding anticoagulation, patient on warfarin at home  - GI: n/a   Family: Updated at bedside   Disposition: pending improvement in respiratory symptoms, currently requiring bipap and possibly intubation   Code Status: Full Code - will need to continue to address       The patient was examined with Dr. Perlman who  "agrees with the plan.      Dale Esquivel MD  Internal Medicine PGY-1  [phone]  =========================================================================  SUBJECTIVE:    Nursing notes reviewed. No acute events overnight. Patient was given lasix 20mg for increasing oxygen requirement. Patient subsequently put out 1.5L and BP soft with MAPs in the 50s. She was given 250mL NS bolus. Now MAPs in 60s.    OBJECTIVE:  Blood pressure (!) 81/59, temperature 97  F (36.1  C), temperature source Axillary, resp. rate 20, height 1.753 m (5' 9.02\"), weight 85.8 kg (189 lb 2.5 oz), SpO2 92 %.    I/O last 3 completed shifts:  In: 1076.56 [P.O.:880; I.V.:196.56]  Out: 1325 [Urine:1325]  Wt Readings from Last 5 Encounters:   04/18/17 85.8 kg (189 lb 2.5 oz)   03/20/17 90.7 kg (200 lb)   02/28/17 95 kg (209 lb 8 oz)   01/11/17 91.2 kg (201 lb)   01/09/17 91.2 kg (201 lb)     Ventilation Mode: CMV/AC  FiO2 (%): 70 %  Rate Set (breaths/minute): 25 breaths/min  Tidal Volume Set (mL): 550 mL  PEEP (cm H2O): 12 cmH2O  Oxygen Concentration (%): 80 %  Resp: 20  BP - Mean:  [] 67    Recent Labs  Lab 04/22/17  0447 04/21/17  2105 04/21/17  0404 04/20/17  2132  04/19/17  0626   PH 7.46* 7.43  --  7.42  --  7.40   PCO2 37 37  --  35  --  42   PO2 62* 74*  --  45*  --  56*   HCO3 27 25  --  23  --  26   O2PER 65.0 65 100 100  < > 21   < > = values in this interval not displayed.    General: NAD, bipap on   HEENT: Sclera anicteric, bipap on  CV: RRR, no murmurs, rubs, gallops or extra heart sounds  Resp: CTAB, moving air well with bipap, no accessory muscle use, breathing seems labored without bipap, desats frequently when talking too much or bipap off  Abdomen: +bs, soft, nontender, nondistended, no organomegaly appreciated   Extremities: No clubbing or cyanosis, no LE edema bilaterally, peripheral pulses full   Skin: Warm and dry, no jaundice, rash or concerning lesions  Neurological: Alert and oriented, moving extremities equally "     DIAGNOSTIC STUDIES  ROUTINE ICU LABS  CMP    Recent Labs  Lab 04/22/17  0350 04/21/17  2136 04/20/17  0427 04/19/17  1550 04/19/17  0507 04/18/17  1046    142 138  --  142 139   POTASSIUM 3.7 3.8 4.1 4.0 3.9 4.1   CHLORIDE 108 107 108  --  108 103   CO2 26 26 23  --  28 25   ANIONGAP 11 10 8  --  6 10   GLC 84 121* 181*  --  106* 110*   BUN 13 14 7  --  11 12   CR 0.86 0.83 0.67  --  0.96 0.96   GFRESTIMATED 65 68 87  --  57* 57*   GFRESTBLACK 79 83 >90African American GFR Calc  --  69 69   ANJALI 8.0* 7.9* 7.9*  --  7.8* 8.9   MAG 2.3 1.8  --  1.9 1.8  --    PROTTOTAL 6.3*  --   --   --  6.4* 9.0*   ALBUMIN 2.4*  --   --   --  2.6* 3.5   BILITOTAL 0.6  --   --   --  2.0* 1.1   ALKPHOS 55  --   --   --  55 73   AST 19  --   --   --  30 33   ALT 32  --   --   --  27 36     CBC    Recent Labs  Lab 04/22/17  0350 04/20/17  0427 04/18/17  1046   WBC 12.2* 6.7 11.6*   RBC 3.90 3.89 5.18   HGB 12.2 12.2 16.9*   HCT 37.2 37.5 49.5*   MCV 95 96 96   MCH 31.3 31.4 32.6   MCHC 32.8 32.5 34.1   RDW 14.4 14.1 14.2    218 246     INR    Recent Labs  Lab 04/22/17  0350 04/21/17  0404 04/20/17  0427 04/19/17  0507   INR 1.55* 2.41* 3.46* 3.13*     Lactic Acid    Recent Labs  Lab 04/18/17  1335 04/18/17  1049   LACT 1.8 3.3*     MICROBIOLOGY  n/a    IMAGING  CXR 4/21   IMPRESSION:   1. Minimal improvement in perihilar and left retrocardiac opacities,  which may represent atelectasis or pneumonia.  2. Chronic reticular interstitial opacities and fibrosis.    Critical Care Services Progress Note:     Sophia Johnson remains critically ill with hypoxic respiratory failure     I personally examined and evaluated the patient today.   The patient s prognosis today is grave  I have evaluated all laboratory values and imaging studies from the past 24 hours.  Key findings and decisions made today included Stable overnight on vent, on flolan, FiO2 down to 70%, comfortably sedated, will start TF today, cont vent support, await  lung transplant.  I personally managed the ventilator, sedation, pain control and analgesia and nutritional status.   Consults ongoing and ordered are Palliative care  Procedures that will happen today are: none  All treatments were placed at my direction.  I formulated today s plan with the house staff team or resident(s) and agree with the findings and plan in the associated note.       The above plans and care have been discussed with family and all questions and concerns were addressed.     I spent a total of 35 minutes (excluding procedure time) personally providing and directing critical care services at the bedside and on the critical care unit for Sophia Johnson.        David Morris Perlman

## 2017-04-22 NOTE — PHARMACY-CONSULT NOTE
Pharmacy Tube Feeding Consult    Medication reviewed for administration by feeding tube and for potential food/drug interactions.    Recommendation: No changes are needed at this time.     Pharmacy will continue to follow as new medications are ordered.   Bautista SchulerD

## 2017-04-22 NOTE — PLAN OF CARE
Problem: Goal Outcome Summary  Goal: Goal Outcome Summary  Outcome: No Change     Remains sedated with 100mcg/hr fentanyl and 8mg/hr versed, requires frequents bumps with turns for coughing fits with desats to the low 80s. CMV 20/550/12, no ETT secretions, remains on full strength flolan. PO2 74 to 62 so FiO2 increased to 70%, MD notified. Afebrile, sinus 70s-80s with T wave inversion, BPs occasionally soft but MAPs >65. Given 20mg lasix for low urine output overnight, has put out 1.5L since; currently receiving 250mL NS bolus. Mg of 1.8 replaced with 2g, recheck 2.3.      Continue to support respiratory status. Monitor hemodynamics/fluid status. Recheck lytes per protocol. Notify MICU team of changes.

## 2017-04-22 NOTE — PLAN OF CARE
Problem: Goal Outcome Summary  Goal: Goal Outcome Summary  Outcome: Declining  D:  Patient with IPF on lung transplant list, requiring 100% BiPAP vs 100% HFNC with supplemental OxiPlus at 15 LPM.  I:  Patient with SpO2 persistently in the 70s to 80s despite 100% BiPAP.  Morphine and Ativan given q2 hrs and q4 hrs, respectively.  Intubation discussed multiple times throughout the day with patient and family, finally decided to intubate once sats dipped into the 60s.  Intubated ~ 1600.  PEEP at 12.  Full strength Flolan initiated.  Fentanyl and Versed drips for sedation.  Versed quickly up-titrated to 8 with frequent boluses as patient coughing/choking on ETT/sitting upright in bed.  A: FiO2 weaned to 65% once intubated.  Patient with labile status - patient is either deeply sedated with SpO2 >90%, requiring 65% FiO2 to wide awake/anxious/coughing/choking on tube, while de-saturating to 70s while on 100% FiO2.  P:  Keep well-sedated and comfortable overnight.  Re-assess sedation needs/goals again tomorrow.  Continue to monitor.  Continue POC.

## 2017-04-22 NOTE — PROGRESS NOTES
CLINICAL NUTRITION SERVICES - progress toward nutrition POC. See 4/20 note for full assessment.    -Pt re-intubated yesterday, OGT in place.   -K+, MG++ WNL, Phos 2.4    Interventions  -Collaboration with other providers - Discussed starting TF via OGT w/ MD, later paged MD requesting phos IV replacement protocol.   -Enteral Nutrition - Ordered the following (per 4/20 recs):  1. TwoCal HN @ 15 mL/hr and adv by 10 mL q8h until @ goal rate of 45 ml/hr to provide 2160 kcal (30 kcal/kg adj wt), 91 g PRO (1.3 g/kg adj wt), 756 mL H2O, 237 g CHO and 5 g Fiber daily.   2. Free water flushes 30 mL q4h for patency  3. Daily multivit/min (Certavite) supplement to help meet micronutrient needs.   4. K+, Mg++, phos ordered daily while nutrition support advancing. Do not adv if K+, Mg++ < nrml or phos <2. Replace lytes per protocol.    RD will continue to follow.    Zaida Seay RD, LD  Weekend Pager: [phone]  Weekday MICU pager: [phone]

## 2017-04-23 NOTE — PLAN OF CARE
Problem: Goal Outcome Summary  Goal: Goal Outcome Summary  Outcome: No Change  D: Patient with IPF on lung transplant list.  Remains on ventilator, 70% FiO2, PEEP 12, inhaled Flolan.  Sedated with Fentanyl and Versed, but very labile with wakefulness/agitation and excessive coughing at times.  I: Requiring increased doses of Fentanyl and Versed, as well as frequent boluses as patient becomes hypoxic during times of agitation/coughing.  Episodes triggered with minimal stimulation and patient not tolerating turns well even when pre-medicated.  With increased sedation, BPs soft throughout the day.  Ketamine drip initiated to optimize sedation, and ideally wean other sedation to help hemodynamics.  Since initiation, able to titrate down Fentanyl and Versed drips.  Tube feeds initiated this afternoon via OGT.  A:  BPs still soft at times, but improving as decreasing Fentanyl/Versed.  Sats >90% when calm/sedated.  Patient now tolerating turns without issue.  P:  Continue to wean sedation as tolerated, while keeping patient comfortable and oxygenation status stable.  Wean FiO2 as tolerated.  Increase tube feeds as tolerated per orders.  Continue to monitor.  Continue POC.

## 2017-04-23 NOTE — PROGRESS NOTES
[facility]  Patient: [facility]   Admission date: 2017  MRN: [id number]  : 1945      ______________________________________________________________________________  ASSESSMENT AND PLAN  Sophia Johnson is a 71 year old woman with history of Idiopathic Pulmonary Fibrosis who is listed for transplant and on 10L of home O2, systolic CHF, afib on coumadin and pulmonary HTN who is admitted for acute on chronic respiratory failure likely 2/2 IPF. Intubated on .      CHANGES TODAY:   --Wean vent as tolerated  --Ketamine since yesterday      NEURO/PSYCH  # Sedation/# Pain:   -Ketamine, fentanyl, midazolam gtt  -Wean as tolerated      CARDIOVASCULAR  # HFrEF (EF 50-55% on 3/29/17)/Biventricular Dysfunction:  # Increased Right Ventricular Systolic Pressure 2/2 IPF:  Nt-pro BNP elevated ~7000. She doesn't seems to be volume overloaded on exam. Per cardiology's last note she is on torsemide 10mg daily but she hasn't taken it in 2 years. Per chart review, her weight is down at 189# and she was 200# a month ago. Elevated Nt-pro BNP likely due to elevated right side heart pressure from worsening IPF. Echo  this admission showed no significant changes from recent echo.   --Will diurese as needed  --Holding home spironolactone and antihypertensives      # Troponin Elevation  Trop elevated at 0.297. Likely due to demand. EKG shows T wave inversions in multiple leads but has been present on previous EKG. She does not have active chest pain and troponin down trended.      # Atrial Fibrillation S/P Cardioversion 2017:   INR therapeutic on admission. Currently in sinus rhythm   --Holding home warfarin right now, will address other anticoagulation in the next few days.       # Hypertension  BP soft on admission.   --Holding home lisinopril and metoprolol      PULMONARY  # Pulmonary Hypertension 2/2 IPF:  # Idiopathic Pulmonary Fibrosis:   # Acute on Chronic Hypoxic Respiratory Failure:   Patient is  currently listed for transplant but her PRA is 100% and match would be difficult per Dr. Bowman's last note. She has had increased dyspnea with hypoxia for several days prior to admission. Her current acute on chronic respiratory failure likely 2/2 worsening IPF versus underlying infection. Procal negative. On 4/22, patient with hypoxia with sats in the 70s. Discussed with patient and family regarding goals of care and elected to non-urgently intubate on 4/22. On full strength Flolan.  --Wean vent and Flolan as tolerated  --Dilaudid 0.2mg q2h PRN or morphine 1-2mg q2h PRN  --Treat anxiety as above  --Palliative care consulted, appreciate recommendations   --Holding home prifenidone   --Solumedrol 60mg IV x 5 days (4/21-4/25)  > Goals of care discussion: Patient and family did participate in goals of care discussion. Patient indicated that she was not ready to die. Due to increasing hypoxia, patient was intubated. Patient and family are aware that if patient is intubated for > 7 days then she will no longer be on the transplant list.          GI  No acute issues.       # Nutrition: Initiate tube feeds through OG      RENAL  No acute issues.   --Monitor       # Elevated Lactic Acid- Resolved  3.3 on admission that improved to 1.8 after fluids.      HEME/ONC  No acute issues. Hgb stable.       ENDOCRINE  No acute issues.       INFECTIOUS DISEASE  No evidence of infectious source. Patient has been afebrile. UA negative. CXR did not show significant new focal changes. Procal negative.   --Will consider adding abx if she becomes febrile or decompensates       SKIN/MSK  No acute issues.       Prophylaxis:   - DVT: holding anticoagulation, patient on warfarin at home  - GI: n/a   Family: Updated at bedside   Disposition: pending improvement in respiratory symptoms, currently requiring bipap and possibly intubation   Code Status: Full Code - will need to continue to address       The patient was examined with Dr. Perlman who  "agrees with the plan.      Dale Esquivel MD  Internal Medicine PGY-1  [phone]  =========================================================================  SUBJECTIVE:    Nursing notes reviewed. No acute events overnight. This morning, patient de-sated to 80s and FiO2 increased to 100%.    OBJECTIVE:  Blood pressure 98/63, temperature 96.4  F (35.8  C), temperature source Axillary, resp. rate 20, height 1.753 m (5' 9.02\"), weight 85.8 kg (189 lb 2.5 oz), SpO2 92 %.    I/O last 3 completed shifts:  In: 1603.83 [I.V.:1048.83; NG/GT:135; IV Piggyback:250]  Out: 3350 [Urine:3350]  Wt Readings from Last 5 Encounters:   04/18/17 85.8 kg (189 lb 2.5 oz)   03/20/17 90.7 kg (200 lb)   02/28/17 95 kg (209 lb 8 oz)   01/11/17 91.2 kg (201 lb)   01/09/17 91.2 kg (201 lb)     Ventilation Mode: CMV/AC  FiO2 (%): 70 %  Rate Set (breaths/minute): 20 breaths/min  Tidal Volume Set (mL): 550 mL  PEEP (cm H2O): 12 cmH2O  Oxygen Concentration (%): 100 %  Resp: 17  BP - Mean:  [52-98] 72   Venous Blood Gas    Recent Labs  Lab 04/23/17  0017 04/22/17  0447 04/21/17  2105 04/21/17  0404  04/20/17  0427  04/18/17  1049   PHV 7.43  --   --  7.36  --  7.42  --  7.42   PCO2V 44  --   --  44  --  35*  --  44   PO2V 38  --   --  70*  --  74*  --  20*   HCO3V 30*  --   --  25  --  22  --  29*   SERGO 4.6  --   --   --   --   --   --   --    O2PER 70 65.0 65 100  < > 100  < >  --    < > = values in this interval not displayed.    Recent Labs  Lab 04/23/17  0017 04/22/17  0447 04/21/17 2105 04/21/17 0404 04/20/17  2132 04/19/17  0626   PH  --  7.46* 7.43  --  7.42  --  7.40   PCO2  --  37 37  --  35  --  42   PO2  --  62* 74*  --  45*  --  56*   HCO3  --  27 25  --  23  --  26   O2PER 70 65.0 65 100 100  < > 21   < > = values in this interval not displayed.    General: NAD, intubated and sedated  HEENT: Sclera anicteric, bipap on  CV: RRR, no murmurs, rubs, gallops or extra heart sounds  Resp: CTAB with bibasilar crackles  Abdomen: soft, " nontender, nondistended, no organomegaly appreciated   Extremities: No clubbing or cyanosis, no LE edema bilaterally, peripheral pulses full   Skin: Warm and dry, no jaundice, rash or concerning lesions  Neurological: intubated and sedated    DIAGNOSTIC STUDIES  ROUTINE ICU LABS  CMP    Recent Labs  Lab 04/23/17 0438 04/22/17 0350 04/21/17  2136 04/20/17 0427 04/19/17  1550 04/19/17  0507  04/18/17  1046    144 142 138  --  142  --  139   POTASSIUM 3.8 3.7 3.8 4.1 4.0 3.9  --  4.1   CHLORIDE 108 108 107 108  --  108  --  103   CO2 28 26 26 23  --  28  --  25   ANIONGAP 8 11 10 8  --  6  --  10   * 84 121* 181*  --  106*  --  110*   BUN 16 13 14 7  --  11  --  12   CR 0.76 0.86 0.83 0.67  --  0.96  --  0.96   GFRESTIMATED 75 65 68 87  --  57*  --  57*   GFRESTBLACK >90African American GFR Calc 79 83 >90African American GFR Calc  --  69  --  69   ANJALI 8.1* 8.0* 7.9* 7.9*  --  7.8*  --  8.9   MAG 2.1 2.3 1.8  --  1.9 1.8  < >  --    PHOS  --  2.4*  --   --   --   --   --   --    PROTTOTAL  --  6.3*  --   --   --  6.4*  --  9.0*   ALBUMIN  --  2.4*  --   --   --  2.6*  --  3.5   BILITOTAL  --  0.6  --   --   --  2.0*  --  1.1   ALKPHOS  --  55  --   --   --  55  --  73   AST  --  19  --   --   --  30  --  33   ALT  --  32  --   --   --  27  --  36   < > = values in this interval not displayed.  CBC    Recent Labs  Lab 04/23/17 0438 04/22/17  0350 04/20/17 0427 04/18/17  1046   WBC 10.6 12.2* 6.7 11.6*   RBC 3.82 3.90 3.89 5.18   HGB 11.9 12.2 12.2 16.9*   HCT 36.9 37.2 37.5 49.5*   MCV 97 95 96 96   MCH 31.2 31.3 31.4 32.6   MCHC 32.2 32.8 32.5 34.1   RDW 14.2 14.4 14.1 14.2    194 218 246     INR    Recent Labs  Lab 04/23/17  0438 04/22/17  0350 04/21/17  0404 04/20/17  0427   INR 1.41* 1.55* 2.41* 3.46*     Lactic Acid    Recent Labs  Lab 04/18/17  1335 04/18/17  1049   LACT 1.8 3.3*     MICROBIOLOGY  n/a    IMAGING  CXR 4/21   IMPRESSION:   1. Minimal improvement in perihilar and left  retrocardiac opacities,  which may represent atelectasis or pneumonia.  2. Chronic reticular interstitial opacities and fibrosis.    Critical Care Services Progress Note:     Sophia Johnson remains critically ill with hypoxic respiratory failure and pulmonary fibrosis     I personally examined and evaluated the patient today.   The patient s prognosis today is grave  I have evaluated all laboratory values and imaging studies from the past 24 hours.  Key findings and decisions made today included continuing to require high level vent support with flolan, peep-12, fio2 >50%, no improvement, on lung transplant list.  I personally managed the ventilator, sedation, metabolic abnormalities and nutritional status.   Consults ongoing and ordered are Palliative care  Procedures that will happen today are: none  All treatments were placed at my direction.  I formulated today s plan with the house staff team or resident(s) and agree with the findings and plan in the associated note.       The above plans and care have been discussed with family and all questions and concerns were addressed.     I spent a total of 35 minutes (excluding procedure time) personally providing and directing critical care services at the bedside and on the critical care unit for Sophia Johnson.        David Morris Perlman

## 2017-04-23 NOTE — PLAN OF CARE
Problem: Goal Outcome Summary  Goal: Goal Outcome Summary  Outcome: No Change     Remains comfortably sedated with fentanyl, ketamine, and versed drips; significantly fewer coughing spells overnight requiring fewer fentanyl/versed bumps. Vent settings unchanged, remains on full strength flolan, sats maintained >92%. Afebrile, sinus 70s with T wave inversion, BPs 100s/60s. Hopper with lower output than yesterday, MD aware. TF advanced to 35 mL/hr.       Continue to support respiratory status, wean vent settings and sedation as tolerated. Advance TF to goal of 45 mL/hr at 1400. Notify MICU team of changes.

## 2017-04-24 NOTE — PLAN OF CARE
Problem: Goal Outcome Summary  Goal: Goal Outcome Summary  Outcome: No Change     Remains comfortably sedated with fentanyl, ketamine, and versed drips. Still requiring occasional bumps of versed with stimulation. Vent settings unchanged with FiO2 80% and PEEP 12, remains on full strength flolan. HR increased to 130s-140s overnight and O2 dropped to 80s; MD notified,12 lead ordered, and flolan setup exchanged. Currently sinus in the 80s. Temp up to 99.2, ice packs applied.       Continue to support respiratory status, wean vent settings and sedation as tolerated. Notify MICU team of changes.

## 2017-04-24 NOTE — PLAN OF CARE
Problem: Goal Outcome Summary  Goal: Goal Outcome Summary  D/I:  Pt continues on FS Flolan, PEEP 12, FiO2 %.  Requiring sedation boluses before any stimulation.  Appears to be very sensitive to Flolan syringe changes and if Flolan not running properly (had various issues with Flolan tubing today).  Sats dropped to 74% today while having trouble with Flolan tubing.  A/P:  VSS, continue to keep patient well sedated.

## 2017-04-24 NOTE — PROGRESS NOTES
[facility]  Patient: Sophia Johnson   Admission date: 2017  MRN: [id number]  : 1945      ______________________________________________________________________________  ASSESSMENT AND PLAN  Sophia Johnson is a 71 year old woman with history of Idiopathic Pulmonary Fibrosis who is listed for transplant and on 10L of home O2, systolic CHF, afib on coumadin and pulmonary HTN who is admitted for acute on chronic respiratory failure likely 2/2 IPF. Intubated on .      CHANGES TODAY:   --Wean vent as tolerated      NEURO/PSYCH  # Sedation/# Pain:   -Ketamine, fentanyl, midazolam gtt  -Wean as tolerated      CARDIOVASCULAR  # HFrEF (EF 50-55% on 3/29/17)/Biventricular Dysfunction:  # Increased Right Ventricular Systolic Pressure / IPF:  Nt-pro BNP elevated ~7000. She doesn't seems to be volume overloaded on exam. Per cardiology's last note she is on torsemide 10mg daily but she hasn't taken it in 2 years. Per chart review, her weight is down at 189# and she was 200# a month ago. Elevated Nt-pro BNP likely due to elevated right side heart pressure from worsening IPF. Echo  this admission showed no significant changes from recent echo.   --Will diurese as needed  --Holding home spironolactone and antihypertensives      # Troponin Elevation  Trop elevated at 0.297. Likely due to demand. EKG shows T wave inversions in multiple leads but has been present on previous EKG. She does not have active chest pain and troponin down trended.      # Atrial Fibrillation S/P Cardioversion 2017:   INR therapeutic on admission. Currently in sinus rhythm   --Holding home warfarin right now, will address other anticoagulation in the next few days.       # Hypertension  BP soft on admission.   --Holding home lisinopril and metoprolol      PULMONARY  # Pulmonary Hypertension 2/ IPF:  # Idiopathic Pulmonary Fibrosis:   # Acute on Chronic Hypoxic Respiratory Failure:   Patient is currently listed for  transplant but her PRA is 100% and match would be difficult per Dr. Bowman's last note. She has had increased dyspnea with hypoxia for several days prior to admission. Her current acute on chronic respiratory failure likely 2/2 worsening IPF versus underlying infection. Procal negative. On 4/22, patient with hypoxia with sats in the 70s. Discussed with patient and family regarding goals of care and elected to non-urgently intubate on 4/22. On full strength Flolan. Transplant meeting on 4/27 will likely dictate the direction of therapy going forward.  --Wean vent and Flolan as tolerated  --Dilaudid 0.2mg q2h PRN or morphine 1-2mg q2h PRN  --Treat anxiety as above  --Palliative care consulted, appreciate recommendations   --Holding home prifenidone   --Solumedrol 60mg IV x 5 days (4/21-4/25)  > Goals of care discussion: Patient and family did participate in goals of care discussion. Patient indicated that she was not ready to die. Due to increasing hypoxia, patient was intubated. Patient and family are aware that if patient is intubated for > 7 days then she will no longer be on the transplant list.          GI  No acute issues.       # Nutrition: Initiate tube feeds through OG      RENAL  #Hypernatremia  -Free water flushes 60 q2h      # Elevated Lactic Acid- Resolved  3.3 on admission that improved to 1.8 after fluids.      HEME/ONC  No acute issues. Hgb stable.       ENDOCRINE  No acute issues.       INFECTIOUS DISEASE  No evidence of infectious source. Patient has been afebrile. UA negative. CXR did not show significant new focal changes. Procal negative.   --Will consider adding abx if she becomes febrile or decompensates       SKIN/MSK  No acute issues.       Prophylaxis:   - DVT: holding anticoagulation, patient on warfarin at home  - GI: n/a   Family: Updated at bedside   Disposition: pending improvement in respiratory symptoms, currently requiring bipap and possibly intubation   Code Status: Full Code - will  "need to continue to address       The patient was examined with Dr. Flores who agrees with the plan.      Dale Esquivel MD  Internal Medicine PGY-1  [phone]  =========================================================================  SUBJECTIVE:    Nursing notes reviewed. HR increased to 130s-140s overnight and O2 dropped to 80s. Flolan setup exchanged.     OBJECTIVE:  Blood pressure (!) 86/63, temperature 99.2  F (37.3  C), temperature source Axillary, resp. rate 22, height 1.753 m (5' 9.02\"), weight 85.8 kg (189 lb 2.5 oz), SpO2 91 %.    Intake/Output Summary (Last 24 hours) at 04/24/17 0722  Last data filed at 04/24/17 0700   Gross per 24 hour   Intake           2621 ml   Output              775 ml   Net           1846 ml     Wt Readings from Last 5 Encounters:   04/18/17 85.8 kg (189 lb 2.5 oz)   03/20/17 90.7 kg (200 lb)   02/28/17 95 kg (209 lb 8 oz)   01/11/17 91.2 kg (201 lb)   01/09/17 91.2 kg (201 lb)     Ventilation Mode: CMV/AC  FiO2 (%): 80 %  Rate Set (breaths/minute): 20 breaths/min  Tidal Volume Set (mL): 550 mL  PEEP (cm H2O): 12 cmH2O  Oxygen Concentration (%): 80 %  Resp: 22  BP - Mean:  [65-85] 72     Recent Labs  Lab 04/24/17  0602 04/23/17  0017 04/22/17  0447 04/21/17  2105  04/20/17  2132   PH 7.40  --  7.46* 7.43  --  7.42   PCO2 48*  --  37 37  --  35   PO2 66*  --  62* 74*  --  45*   HCO3 30*  --  27 25  --  23   O2PER 80 70 65.0 65  < > 100   < > = values in this interval not displayed.    General: NAD, intubated and sedated  HEENT: Sclera anicteric, bipap on  CV: RRR, no murmurs, rubs, gallops or extra heart sounds  Resp: CTAB with bibasilar crackles  Abdomen: soft, nontender, nondistended, no organomegaly appreciated   Extremities: No clubbing or cyanosis, no LE edema bilaterally, peripheral pulses full   Skin: Warm and dry, no jaundice, rash or concerning lesions  Neurological: intubated and sedated    DIAGNOSTIC STUDIES  ROUTINE ICU LABS  CMP    Recent Labs  Lab 04/24/17  0351 " 04/23/17 0438 04/22/17 0350 04/21/17  2136  04/19/17  1550 04/19/17  0507  04/18/17  1046   * 144 144 142  < >  --  142  --  139   POTASSIUM 4.1 3.8 3.7 3.8  < > 4.0 3.9  --  4.1   CHLORIDE 111* 108 108 107  < >  --  108  --  103   CO2 29 28 26 26  < >  --  28  --  25   ANIONGAP 7 8 11 10  < >  --  6  --  10   * 120* 84 121*  < >  --  106*  --  110*   BUN 18 16 13 14  < >  --  11  --  12   CR 0.80 0.76 0.86 0.83  < >  --  0.96  --  0.96   GFRESTIMATED 71 75 65 68  < >  --  57*  --  57*   GFRESTBLACK 86 >90African American GFR Calc 79 83  < >  --  69  --  69   ANJALI 7.9* 8.1* 8.0* 7.9*  < >  --  7.8*  --  8.9   MAG  --  2.1 2.3 1.8  --  1.9 1.8  < >  --    PHOS  --   --  2.4*  --   --   --   --   --   --    PROTTOTAL  --   --  6.3*  --   --   --  6.4*  --  9.0*   ALBUMIN  --   --  2.4*  --   --   --  2.6*  --  3.5   BILITOTAL  --   --  0.6  --   --   --  2.0*  --  1.1   ALKPHOS  --   --  55  --   --   --  55  --  73   AST  --   --  19  --   --   --  30  --  33   ALT  --   --  32  --   --   --  27  --  36   < > = values in this interval not displayed.  CBC    Recent Labs  Lab 04/24/17  0351 04/23/17 0438 04/22/17 0350 04/20/17  0427   WBC 12.6* 10.6 12.2* 6.7   RBC 3.74* 3.82 3.90 3.89   HGB 11.5* 11.9 12.2 12.2   HCT 37.0 36.9 37.2 37.5   MCV 99 97 95 96   MCH 30.7 31.2 31.3 31.4   MCHC 31.1* 32.2 32.8 32.5   RDW 14.2 14.2 14.4 14.1    188 194 218     INR    Recent Labs  Lab 04/24/17  0351 04/23/17  0438 04/22/17  0350 04/21/17  0404   INR 1.41* 1.41* 1.55* 2.41*     Lactic Acid    Recent Labs  Lab 04/18/17  1335 04/18/17  1049   LACT 1.8 3.3*     MICROBIOLOGY  n/a    IMAGING  CXR 4/21   IMPRESSION:   1. Minimal improvement in perihilar and left retrocardiac opacities,  which may represent atelectasis or pneumonia.  2. Chronic reticular interstitial opacities and fibrosis.

## 2017-04-25 NOTE — PLAN OF CARE
Problem: Patient Care Overview (Adult)  Goal: Individualization and Mutuality  Outcome: Declining  Shift durration: 6748-5850     LOC: moderately sedated with ketamine, versed and fentanyl.  Arouses to voice and painful stimuli.  Neuro: no focal deficits noted.  Cards: ST with chronic T wave inversion per report.  Not on pressors.  MAP's ~75. A line placed this noon.  Pulm: CMV settings via ETT, , PEEP increased from 12 to 12, FiO2 increased from 80% to 100%.  GI/: Pt is on bowel reg senna and prn Miralax given, awaiting BM (last BM charted 4/20), adequate UOP via urethral suarez catheter.  Skin: intact, slight pink discoloration on coccyx and heels bilat, Pt turned side to side q2H, area is blanchable.  Mobility: A2 side to side repositioning, pt on bedrest with high oxygen/ventillation needs at this time.  Vasc access: PICC LUE, A line being placed.    Special/Event:    Pt handed off at 1300 to be pronated and be 1:1 pt/nurse assignment.  Pt displaying increased O2 and ventillation needs today.  Daughter updated by MICU primary team today as to plan.

## 2017-04-25 NOTE — PROGRESS NOTES
CLINICAL NUTRITION SERVICES - BRIEF NOTE    Plan is for proning today. Pt has an OGT for enteral nutrition therapy and PO medication administration. Pt has a h/o of hiatal hernia as well as not tolerating insertion of nasal probe (during manometry; should be noted- this was while she was awake but given lidocaine gel). Per RN, pt is coughing and gagging on ETT when sedation is lightened, so much that she had emesis this AM.    INTERVENTIONS  Recommendations / Nutrition Prescription  XR feeding tube placement order once or when pt is back to being supine (if the plan is to go back into the prone position).  Will need to have FT bridled to ensure it stays in place.    Implementation  Collaboration with other providers- discussed above issues with bedside RN, charge RN, and MICU team. Plan is for proning ASAP so no time for FT placement of any sort. Told RN to put gastric feeds on hold while pt is prone.      Bianca Garcia, RD, LD  (MICU dietitian, pgr- [phone])

## 2017-04-25 NOTE — PROGRESS NOTES
"[facility], [place]   Palliative Care Daily Progress Note          Recommendations, Patient/Family Counseling & Coordination     I met with Kath, Jessica's daughter, at the bedside today. I had not met Kath previously, so I introduced the Palliative Care team. She expressed appreciation for the additional support.     Jessica is now intubated and proned, due to increasing oxygen requirements. Kath said her family seems to be coping well, but is scared. They knew this was a possibility, but hoped  \"things would work themselves out before it came to this.\" She said they understand the complexity of the situation, and the difficulty of finding a lung that matches for transplant. Their community  has been very involved and supportive.    Coping/Support:  -Palliative Care Interdisciplinary team will continue to follow for additional support.  -Ongoing goals of care conversations are appropriate, in the setting of a critically ill woman with a life-limiting disease and guarded prognosis.        Thank you for the opportunity to continue to participate in the care of this patient and family.  Please feel free to contact on-call palliative provider with any emergent needs.  We can be reached via team pager [phone] (answered 8-4:30 Monday-Friday); after-hours answering service ([phone]).    DAWNA Lamas CNP  Palliative Care Consult Team  Pager: [phone]    35 minutes spent with patient, with >50% counseling and in care coordination.        Assessment      1) Diagnoses & symptoms:        Idiopathic Pulmonary Fibrosis on transplant list  Respiratory Failure now on mechanical ventilator   Anxiety using Lorazepam prn  Constipation     2)  Psychosocial/Spiritual Needs:   Ongoing: Will discuss case during palliative interdisciplinary team rounds and involve LICSW and  as needed.              Interval History:   Remains in ICU, now intubated. On transplant list.            " Review of Systems:   Unable to complete ROS 2/2 pt being intubated, sedated, and paralyzed.          Medications:   I have reviewed this patient's medication profile and medications given in past 24 hours.           Physical Exam:   Vitals were reviewed  Temp: 98.6  F (37  C) Temp src: Axillary BP: (!) 80/58   Heart Rate: 96 Resp: 21 SpO2: 93 % O2 Device: Mechanical Ventilator    Constitutional: Seen proned in bed. Ill appearing, but in no acute distress.  Head: ET tube in place.   Pulm: Non-labored breathing, on mechanical ventilation.   Skin: Warm and dry. No concerning rashes or lesions on exposed areas.   Neuropsych: Medically sedated and paralyzed.            Data Reviewed:   ROUTINE ICU LABS (Last four results)  CMP  Recent Labs  Lab 04/25/17  0308 04/24/17  1652 04/24/17  0351 04/23/17  0438 04/22/17  0350  04/19/17  0507   * 144 147* 144 144  < > 142   POTASSIUM 3.9 4.1 4.1 3.8 3.7  < > 3.9   CHLORIDE 107 109 111* 108 108  < > 108   CO2 31 30 29 28 26  < > 28   ANIONGAP 8 4 7 8 11  < > 6   GLC 99 135* 100* 120* 84  < > 106*   BUN 19 18 18 16 13  < > 11   CR 0.76 0.76 0.80 0.76 0.86  < > 0.96   GFRESTIMATED 75 75 71 75 65  < > 57*   GFRESTBLACK >90African American GFR Calc >90African American GFR Calc 86 >90African American GFR Calc 79  < > 69   ANJALI 7.7* 7.7* 7.9* 8.1* 8.0*  < > 7.8*   MAG 2.0 2.1  --  2.1 2.3  < > 1.8   PHOS 2.4* 2.0*  --   --  2.4*  --   --    PROTTOTAL  --   --   --   --  6.3*  --  6.4*   ALBUMIN  --   --   --   --  2.4*  --  2.6*   BILITOTAL  --   --   --   --  0.6  --  2.0*   ALKPHOS  --   --   --   --  55  --  55   AST  --   --   --   --  19  --  30   ALT  --   --   --   --  32  --  27   < > = values in this interval not displayed.  CBC    Recent Labs  Lab 04/24/17  0351 04/23/17  0438 04/22/17  0350 04/20/17  0427   WBC 12.6* 10.6 12.2* 6.7   RBC 3.74* 3.82 3.90 3.89   HGB 11.5* 11.9 12.2 12.2   HCT 37.0 36.9 37.2 37.5   MCV 99 97 95 96   MCH 30.7 31.2 31.3 31.4   MCHC 31.1* 32.2  32.8 32.5   RDW 14.2 14.2 14.4 14.1    188 194 218     INR    Recent Labs  Lab 04/25/17  0308 04/24/17  0351 04/23/17  0438 04/22/17  0350   INR 1.33* 1.41* 1.41* 1.55*     Arterial Blood Gas  Recent Labs  Lab 04/24/17  0602 04/23/17  0017 04/22/17  0447 04/21/17  2105  04/20/17  2132   PH 7.40  --  7.46* 7.43  --  7.42   PCO2 48*  --  37 37  --  35   PO2 66*  --  62* 74*  --  45*   HCO3 30*  --  27 25  --  23   O2PER 80 70 65.0 65  < > 100   < > = values in this interval not displayed.

## 2017-04-25 NOTE — PROGRESS NOTES
Cross Cover Note    Called at 5:30am regarding patient desaturating to SpO2 80s spontaneously. Earlier in the evening patient had brief episodes of decreased SpO2 but these had been associated with coughing. FiO2 was increased from 80% to 100% and PEEP increased from to 20. CXR ordered and showed slight worsening of bilateral pulmonary opacities. Patient was given 10mg IV lasix given concern for fluid overload (net + 2.5L yesterday). Her SpO2 gradually increased to 93-94% after approximately 10 minutes at PEEP 20. PEEP was gradually weaned to 16 and then several minutes later to 12. SpO2 remained 92-93% after this. Will now attempt to wean FiO2.    Kenia Montilla, PGY2  [phone]

## 2017-04-25 NOTE — PROCEDURES
"Procedure/Surgery Information   [facility], [place]    Bedside Procedure Note  Date of Service (when I performed the procedure): 04/25/2017    Sophia Johnson is a 71 year old female patient.  1. Respiratory failure (H)    2. Long-term (current) use of anticoagulants [Z79.01]    3. IPF (idiopathic pulmonary fibrosis) (H)    4. Anxiety      Past Medical History:   Diagnosis Date     Atrial fibrillation (H) 4/7/2015     Atypical squamous cell changes of undetermined significance (ASCUS) on cervical cytology with positive high risk human papilloma virus (HPV) age 30 & following     Chronic systolic heart failure (H)      Coughing      Fall against object 2/2005    Fell 35 feet     IPF (idiopathic pulmonary fibrosis) (H)     chest CT 2010 showed pulm fibrosis but not diagnostic of IPF; VATS R lung bx 5/2013 +UIP; RAINIER simutuzumab study until 4-2015; pirfenidone started .     LBP (low back pain)      On home oxygen therapy     uses when walking >3 minutes     Pneumonia     interstitial lung disease      Vitamin D deficiency      Temp: 98.3  F (36.8  C) Temp src: Axillary BP: 110/77   Heart Rate: 101 Resp: 20 SpO2: 92 % O2 Device: Mechanical Ventilator      Insert arterial line  Date/Time: 4/25/2017 5:10 PM  Performed by: ANGELA VALLE  Authorized by: ANGELA VALLE   Consent: Verbal consent obtained. Written consent obtained.  Risks and benefits: risks, benefits and alternatives were discussed  Consent given by: guardian  Procedure consent: procedure consent matches procedure scheduled  Relevant documents: relevant documents present and verified  Test results: test results available and properly labeled  Site marked: the operative site was marked  Required items: required blood products, implants, devices, and special equipment available  Patient identity confirmed: arm band  Time out: Immediately prior to procedure a \"time out\" was called to verify the correct patient, " procedure, equipment, support staff and site/side marked as required.  Preparation: Patient was prepped and draped in the usual sterile fashion.  Indications: multiple ABGs, respiratory failure and hemodynamic monitoring  Location: right radial  Anesthesia: local infiltration    Anesthesia:  Anesthesia: local infiltration  Sedation:  Patient sedated: yes  Sedation type: moderate (conscious) sedation   Sedatives: fentanyl and midazolam  Analgesia: ketamine    Jon's test normal: yes  Needle gauge: 20  Number of attempts: 2  Post-procedure: line sutured and dressing applied  Post-procedure CMS: unchanged and normal  Patient tolerance: Patient tolerated the procedure well with no immediate complications           Dale Esquivel

## 2017-04-25 NOTE — PLAN OF CARE
Problem: Acute Respiratory Distress Syndrome (Adult)  Goal: Signs and Symptoms of Listed Potential Problems Will be Absent or Manageable (Acute Respiratory Distress Syndrome)  Signs and symptoms of listed potential problems will be absent or manageable by discharge/transition of care (reference Acute Respiratory Distress Syndrome (Adult) CPG).   Outcome: No Change  D:  Pt with low grade fever  I/A:  100.2 axiliary ice packed placed underneath upper extremities , recheck temps 99.4 and 98.8 both axillary all other vitals have been stable.  P:  Continue to monitor, assess, and w/POC.

## 2017-04-25 NOTE — PROGRESS NOTES
[facility]  Patient: Sophia Johnson   Admission date: 2017  MRN: [id number]  : 1945      ______________________________________________________________________________  ASSESSMENT AND PLAN  Sophia Johnson is a 71 year old woman with history of Idiopathic Pulmonary Fibrosis who is listed for transplant and on 10L of home O2, systolic CHF, afib on coumadin and pulmonary HTN who is admitted for acute on chronic respiratory failure likely 2/2 IPF. Intubated on .      CHANGES TODAY:   -Prone position  -Vecuronium   -Arterial line  -Nystatin oral solution      NEURO/PSYCH  # Sedation  # Paralytic    -Vecuronium, ketamine, fentanyl, midazolam gtt  -Wean as tolerated  -Prone position      CARDIOVASCULAR  # HFrEF (EF 50-55% on 3/29/17)/Biventricular Dysfunction:  # Increased Right Ventricular Systolic Pressure / IPF:  Nt-pro BNP elevated ~7000. She doesn't seems to be volume overloaded on exam. Per cardiology's last note she is on torsemide 10mg daily but she hasn't taken it in 2 years. Per chart review, her weight is down at 189# and she was 200# a month ago. Elevated Nt-pro BNP likely due to elevated right side heart pressure from worsening IPF. Echo  this admission showed no significant changes from recent echo.   --Will diurese as needed  --Holding home spironolactone and antihypertensives      # Troponin Elevation  Trop elevated at 0.297. Likely due to demand. EKG shows T wave inversions in multiple leads but has been present on previous EKG. She does not have active chest pain and troponin down trended.      # Atrial Fibrillation S/P Cardioversion 2017:   INR therapeutic on admission. Currently in sinus rhythm   --Holding home warfarin right now, will address other anticoagulation in the next few days.       # Hypertension  BP soft on admission.   --Holding home lisinopril and metoprolol      PULMONARY  # Pulmonary Hypertension 2/ IPF:  # Idiopathic Pulmonary Fibrosis:   # Acute  on Chronic Hypoxic Respiratory Failure:   Patient is currently listed for transplant but her PRA is 100% and match would be difficult per Dr. Bowman's last note. She has had increased dyspnea with hypoxia for several days prior to admission. Her current acute on chronic respiratory failure likely 2/2 worsening IPF versus underlying infection. Procal negative. On 4/22, patient with hypoxia with sats in the 70s. Discussed with patient and family regarding goals of care and elected to non-urgently intubate on 4/22. On full strength Flolan. Transplant meeting on 4/27 will likely dictate the direction of therapy going forward. Prone position starting 4/25.  --Prone position 18/6  --Wean vent and Flolan as tolerated  --Dilaudid 0.2mg q2h PRN or morphine 1-2mg q2h PRN  --Treat anxiety as above  --Palliative care consulted, appreciate recommendations   --Holding home prifenidone   --Solumedrol 60mg IV x 5 days (4/21-4/25)  > Goals of care discussion: Patient and family did participate in goals of care discussion. Patient indicated that she was not ready to die. Due to increasing hypoxia, patient was intubated. Patient and family are aware that if patient is intubated for > 7 days then she will no longer be on the transplant list.          GI  No acute issues.       # Nutrition: Initiate tube feeds through OG      RENAL  #Hypernatremia  -Free water flushes 30 q4h      # Elevated Lactic Acid- Resolved  3.3 on admission that improved to 1.8 after fluids.      HEME/ONC  No acute issues. Hgb stable.       ENDOCRINE  No acute issues.       INFECTIOUS DISEASE  No evidence of infectious source. Patient has been afebrile. UA negative. CXR did not show significant new focal changes. Procal negative.   --Will consider adding abx if she becomes febrile or decompensates       SKIN/MSK  No acute issues.       Prophylaxis:   - DVT: holding anticoagulation, patient on warfarin at home  - GI: n/a   Family: Updated at bedside   Disposition:  "pending improvement in respiratory symptoms, currently requiring bipap and possibly intubation   Code Status: Full Code - will need to continue to address       The patient was examined with Dr. Flores who agrees with the plan.      Dale Esquivel MD  Internal Medicine PGY-1  [phone]  =========================================================================  SUBJECTIVE:    Overnight, night resident was called at 5:30am regarding patient desaturating to SpO2 80s spontaneously. Earlier in the evening patient had brief episodes of decreased SpO2 but these had been associated with coughing. FiO2 was increased from 80% to 100% and PEEP increased from to 20. CXR ordered and showed slight worsening of bilateral pulmonary opacities. Patient was given 10mg IV lasix given concern for fluid overload (net + 2.5L yesterday). Her SpO2 gradually increased to 93-94% after approximately 10 minutes at PEEP 20. PEEP was gradually weaned to 16 and then several minutes later to 12. SpO2 remained 92-93% after this.    OBJECTIVE:  Blood pressure 118/85, temperature 97.5  F (36.4  C), temperature source Tympanic, resp. rate 22, height 1.753 m (5' 9.02\"), weight 85.8 kg (189 lb 2.5 oz), SpO2 95 %.    Intake/Output Summary (Last 24 hours) at 04/24/17 0722  Last data filed at 04/24/17 0700   Gross per 24 hour   Intake           3475 ml   Output              965 ml   Net           2510 ml     Wt Readings from Last 5 Encounters:   04/18/17 85.8 kg (189 lb 2.5 oz)   03/20/17 90.7 kg (200 lb)   02/28/17 95 kg (209 lb 8 oz)   01/11/17 91.2 kg (201 lb)   01/09/17 91.2 kg (201 lb)     Ventilation Mode: CMV/AC  FiO2 (%): 80 %  Rate Set (breaths/minute): 20 breaths/min  Tidal Volume Set (mL): 550 mL  PEEP (cm H2O): 12 cmH2O  Oxygen Concentration (%): 80 %  Resp: 22  BP - Mean:  [] 92     Recent Labs  Lab 04/24/17  0602 04/23/17  0017 04/22/17  0447 04/21/17  2105  04/20/17  2132   PH 7.40  --  7.46* 7.43  --  7.42   PCO2 48*  --  37 37  --  35 "   PO2 66*  --  62* 74*  --  45*   HCO3 30*  --  27 25  --  23   O2PER 80 70 65.0 65  < > 100   < > = values in this interval not displayed.    General: NAD, intubated and sedated  HEENT: Sclera anicteric, bipap on  CV: RRR, no murmurs, rubs, gallops or extra heart sounds  Resp: CTAB with bibasilar crackles  Abdomen: soft, nontender, nondistended, no organomegaly appreciated   Extremities: No clubbing or cyanosis, no LE edema bilaterally, peripheral pulses full   Skin: Warm and dry, no jaundice, rash or concerning lesions  Neurological: intubated and sedated    DIAGNOSTIC STUDIES  ROUTINE ICU LABS  CMP    Recent Labs  Lab 04/25/17  0308 04/24/17  1652 04/24/17  0351 04/23/17  0438 04/22/17  0350  04/19/17  0507 04/18/17  1046   * 144 147* 144 144  < > 142 139   POTASSIUM 3.9 4.1 4.1 3.8 3.7  < > 3.9 4.1   CHLORIDE 107 109 111* 108 108  < > 108 103   CO2 31 30 29 28 26  < > 28 25   ANIONGAP 8 4 7 8 11  < > 6 10   GLC 99 135* 100* 120* 84  < > 106* 110*   BUN 19 18 18 16 13  < > 11 12   CR 0.76 0.76 0.80 0.76 0.86  < > 0.96 0.96   GFRESTIMATED 75 75 71 75 65  < > 57* 57*   GFRESTBLACK >90African American GFR Calc >90African American GFR Calc 86 >90African American GFR Calc 79  < > 69 69   ANJALI 7.7* 7.7* 7.9* 8.1* 8.0*  < > 7.8* 8.9   MAG 2.0 2.1  --  2.1 2.3  < > 1.8  --    PHOS 2.4* 2.0*  --   --  2.4*  --   --   --    PROTTOTAL  --   --   --   --  6.3*  --  6.4* 9.0*   ALBUMIN  --   --   --   --  2.4*  --  2.6* 3.5   BILITOTAL  --   --   --   --  0.6  --  2.0* 1.1   ALKPHOS  --   --   --   --  55  --  55 73   AST  --   --   --   --  19  --  30 33   ALT  --   --   --   --  32  --  27 36   < > = values in this interval not displayed.  CBC    Recent Labs  Lab 04/24/17  0351 04/23/17  0438 04/22/17  0350 04/20/17  0427   WBC 12.6* 10.6 12.2* 6.7   RBC 3.74* 3.82 3.90 3.89   HGB 11.5* 11.9 12.2 12.2   HCT 37.0 36.9 37.2 37.5   MCV 99 97 95 96   MCH 30.7 31.2 31.3 31.4   MCHC 31.1* 32.2 32.8 32.5   RDW 14.2 14.2 14.4  14.1    188 194 218     INR    Recent Labs  Lab 04/25/17  0308 04/24/17  0351 04/23/17  0438 04/22/17  0350   INR 1.33* 1.41* 1.41* 1.55*     Lactic Acid    Recent Labs  Lab 04/18/17  1335 04/18/17  1049   LACT 1.8 3.3*     MICROBIOLOGY  n/a    IMAGING  CXR 4/21   IMPRESSION:   1. Minimal improvement in perihilar and left retrocardiac opacities,  which may represent atelectasis or pneumonia.  2. Chronic reticular interstitial opacities and fibrosis.

## 2017-04-25 NOTE — PLAN OF CARE
Problem: Goal Outcome Summary  Goal: Goal Outcome Summary  Outcome: Declining  D: 71 F with IPF admitted d/t respiratory failure requiring intubation on 4/21. Continues to require more vent support.    I/A:   Events:   Episodes of desaturation following coughing attacks; desat to mid 80s. Versed bumps given and pt would rebound to low 90s after coughing stopped.   At 0530, pt desat to low 80s which was not precipitated by coughing. Increased FiO2 to 100% and RT came to increase PEEP to 20. Took over 15 min to achieve sats >90%. Pt started coughing soon after and desaturated to 85%, RT still in room and increased PEEP again to 20. MICU notified and came to bedside. 10mg lasix and STAT CXR ordered. Pt slowly rebounded. Currently PEEP at 12 and FiO2 at 90%.   Neuro: Sedated, arousable to pain. Withdraws to pain in all extremities. PERRL.   Cardiac: SR; HR 90-110s, Frequent ectopy.   Resp: See previous entry. Course lungs sounds bilaterally. Thick secretions when coughing. CMV setting with PEEP at 12 and FiO2 at 90%.   GI: TF at goal of 45 via OG.   : Adequate UOP via suarez.   Gtt: Versed (6mg), ketamine (1mg/kg/hr), fentanyl (100mcg/hr), and full strength flolan. Mg and Phos replaced.    at bedside all night and update.   P: Continue to wean vent setting. Update team with concerns.

## 2017-04-26 NOTE — PLAN OF CARE
Problem: Goal Outcome Summary  Goal: Goal Outcome Summary  Outcome: No Change  Pt with IPF, now proned and awaiting Lung tx. Pt was proned from 1400 on Tues 4/25 until 0800 today 4/26, 18hrs. Well tolerated, and was down to 65% Fio2 while proned. Remains on Full flolan and peep 14. Appropriately paralyzed and sedated. SR , MAPs stable on no pressors, no suggested infection. Having low grade fevers, 100.4 today, for that daily methylprednisone restarted. Tolerated being supined and was proned again at 1300 this afternoon. O2 improved from 91% to 95% with proning. Remains on 65% currently. Had a 1hr bout of tachycardia in the 120-130s, lytes stable, BPs stable. No change to bumps of sedation. Fever unchanged. Resolved on its own. Tried an enema after 6 days without stool, but no output. possibly no actual stool based on reports from family, wasn't really eating at home and TF here was all withdrawn, undigested.      Plan to supinate at 0600 tomorrow again for 6hrs. Radiology to place FT at bedside while supine between 0163-2004 and obtain AXR - which will tell stool pattern also. Will resume TF before reproning. Weekly tx meeting tomorrow morning that our fellow is attending, family hopeful for tx.

## 2017-04-26 NOTE — PLAN OF CARE
Problem: Goal Outcome Summary  Goal: Goal Outcome Summary  Outcome: No Change  Minimal changes over the night shift. FiO2 decreased from 75% to 65%. Versed increased due to rising blood pressures and heart rates. A-line still looking dampened so using cuff for blood pressures. Pt paralyzed and TOF is now within ordered parameters. Pt was proned at 1400 and plan is to return to supine for 6 hours at 0800. Will continue to monitor and continue with current POC.

## 2017-04-26 NOTE — PROGRESS NOTES
[facility] Progress Note  Patient: Sophia Johnson   Admission date: 2017  MRN: [id number]  : 1945      ______________________________________________________________________________  ASSESSMENT AND PLAN  Sophia Johnson is a 71 year old woman with history of Idiopathic Pulmonary Fibrosis who is listed for transplant and on 10L of home O2, systolic CHF, afib on coumadin and pulmonary HTN who is admitted for acute on chronic respiratory failure likely 2/2 IPF. Intubated on .      CHANGES TODAY:   -Continue current therapies  -N[place] tube placement in AM      NEURO/PSYCH  # Sedation  # Paralytic    -Vecuronium, ketamine, fentanyl, midazolam gtt  -Wean as tolerated  -Prone position      CARDIOVASCULAR  # HFrEF (EF 50-55% on 3/29/17)/Biventricular Dysfunction:  # Increased Right Ventricular Systolic Pressure / IPF:  Nt-pro BNP elevated ~7000. She doesn't seems to be volume overloaded on exam. Per cardiology's last note she is on torsemide 10mg daily but she hasn't taken it in 2 years. Per chart review, her weight is down at 189# and she was 200# a month ago. Elevated Nt-pro BNP likely due to elevated right side heart pressure from worsening IPF. Echo  this admission showed no significant changes from recent echo.   --Will diurese as needed  --Holding home spironolactone and antihypertensives      # Troponin Elevation  Trop elevated at 0.297. Likely due to demand. EKG shows T wave inversions in multiple leads but has been present on previous EKG. She does not have active chest pain and troponin down trended.      # Atrial Fibrillation S/P Cardioversion 2017:   INR therapeutic on admission. Currently in sinus rhythm   --Holding home warfarin right now, will address other anticoagulation in the next few days.       # Hypertension  BP soft on admission.   --Holding home lisinopril and metoprolol      PULMONARY  # Pulmonary Hypertension 2/ IPF:  # Idiopathic Pulmonary Fibrosis:   # Acute on Chronic  Hypoxic Respiratory Failure:   Patient is currently listed for transplant but her PRA is 100% and match would be difficult per Dr. Bowman's last note. She has had increased dyspnea with hypoxia for several days prior to admission. Her current acute on chronic respiratory failure likely 2/2 worsening IPF versus underlying infection. Procal negative. On 4/22, patient with hypoxia with sats in the 70s. Discussed with patient and family regarding goals of care and elected to non-urgently intubate on 4/22. On full strength Flolan. Transplant meeting on 4/27 will likely dictate the direction of therapy going forward. Prone position starting 4/25.  --Prone position 18/6  --Wean vent and Flolan as tolerated  --Dilaudid 0.2mg q2h PRN or morphine 1-2mg q2h PRN  --Treat anxiety as above  --Palliative care consulted, appreciate recommendations   --Holding home prifenidone   --Solumedrol 60mg IV x 5 days (4/21-4/25)  > Goals of care discussion: Patient and family did participate in goals of care discussion. Patient indicated that she was not ready to die. Due to increasing hypoxia, patient was intubated. Patient and family are aware that if patient is intubated for > 7 days then she will no longer be on the transplant list.          GI  No acute issues.       # Nutrition: NJ tube placement in AM      RENAL  #Hypernatremia  -Free water flushes 30 q4h      # Elevated Lactic Acid- Resolved  3.3 on admission that improved to 1.8 after fluids.      HEME/ONC  No acute issues. Hgb stable.       ENDOCRINE  No acute issues.       INFECTIOUS DISEASE  No evidence of infectious source. Patient has been afebrile. UA negative. CXR did not show significant new focal changes. Procal negative.   --Will consider adding abx if she becomes febrile or decompensates       SKIN/MSK  No acute issues.       Prophylaxis:   - DVT: holding anticoagulation, patient on warfarin at home  - GI: n/a   Family: Updated at bedside   Disposition: pending improvement  "in respiratory symptoms, currently requiring bipap and possibly intubation   Code Status: Full Code - will need to continue to address       The patient was examined with Dr. Flores who agrees with the plan.      Dale Esquivel MD  Internal Medicine PGY-1  [phone]  =========================================================================  SUBJECTIVE:    No acute events overnight. Patient tolerated prone position well.    OBJECTIVE:  Blood pressure (!) 153/108, temperature 99.2  F (37.3  C), temperature source Tympanic, resp. rate 22, height 1.753 m (5' 9.02\"), weight 85.8 kg (189 lb 2.5 oz), SpO2 95 %.    Intake/Output Summary (Last 24 hours) at 04/24/17 0722  Last data filed at 04/24/17 0700   Gross per 24 hour   Intake           3006 ml   Output              3320 ml   Net           -313 ml     Wt Readings from Last 5 Encounters:   04/18/17 85.8 kg (189 lb 2.5 oz)   03/20/17 90.7 kg (200 lb)   02/28/17 95 kg (209 lb 8 oz)   01/11/17 91.2 kg (201 lb)   01/09/17 91.2 kg (201 lb)     Ventilation Mode: CMV/AC  FiO2 (%): 65 %  Rate Set (breaths/minute): 22 breaths/min  Tidal Volume Set (mL): 550 mL  PEEP (cm H2O): 14 cmH2O  Oxygen Concentration (%): 75 %  Resp: 22  Arterial Line BP: ()/() 56/51  MAP:  [48 mmHg-289 mmHg] 64 mmHg  BP - Mean:  [] 122     Recent Labs  Lab 04/26/17  0930 04/26/17  0358 04/25/17  1812 04/25/17  1700   PH 7.47* 7.48* 7.40 7.36   PCO2 44 45 53* 59*   PO2 54* 103 104 76*   HCO3 32* 34* 32* 33*   O2PER 65 75 85 90.0       General: NAD, intubated and sedated  HEENT: Sclera anicteric, bipap on  CV: RRR, no murmurs, rubs, gallops or extra heart sounds  Resp: CTAB with bibasilar crackles  Abdomen: soft, nontender, nondistended, no organomegaly appreciated   Extremities: No clubbing or cyanosis, no LE edema bilaterally, peripheral pulses full   Skin: Warm and dry, no jaundice, rash or concerning lesions  Neurological: intubated and sedated    DIAGNOSTIC STUDIES  ROUTINE ICU " LABS  CMP    Recent Labs  Lab 04/26/17  0358 04/25/17  1440 04/25/17  0308 04/24/17  1652  04/23/17  0438 04/22/17  0350    140 145* 144  < > 144 144   POTASSIUM 3.8 3.9 3.9 4.1  < > 3.8 3.7   CHLORIDE 103 102 107 109  < > 108 108   CO2 33* 31 31 30  < > 28 26   ANIONGAP 6 6 8 4  < > 8 11   GLC 89 161* 99 135*  < > 120* 84   BUN 18 19 19 18  < > 16 13   CR 0.66 0.74 0.76 0.76  < > 0.76 0.86   GFRESTIMATED 89 78 75 75  < > 75 65   GFRESTBLACK >90African American GFR Calc >90African American GFR Calc >90African American GFR Calc >90African American GFR Calc  < > >90African American GFR Calc 79   ANJALI 7.9* 7.6* 7.7* 7.7*  < > 8.1* 8.0*   MAG  --  2.1 2.0 2.1  --  2.1 2.3   PHOS  --  3.5 2.4* 2.0*  --   --  2.4*   PROTTOTAL  --   --   --   --   --   --  6.3*   ALBUMIN  --   --   --   --   --   --  2.4*   BILITOTAL  --   --   --   --   --   --  0.6   ALKPHOS  --   --   --   --   --   --  55   AST  --   --   --   --   --   --  19   ALT  --   --   --   --   --   --  32   < > = values in this interval not displayed.  CBC    Recent Labs  Lab 04/26/17  0358 04/24/17  0351 04/23/17  0438 04/22/17  0350   WBC 14.8* 12.6* 10.6 12.2*   RBC 3.70* 3.74* 3.82 3.90   HGB 11.8 11.5* 11.9 12.2   HCT 36.1 37.0 36.9 37.2   MCV 98 99 97 95   MCH 31.9 30.7 31.2 31.3   MCHC 32.7 31.1* 32.2 32.8   RDW 14.0 14.2 14.2 14.4    181 188 194     INR    Recent Labs  Lab 04/26/17  0358 04/25/17  0308 04/24/17  0351 04/23/17  0438   INR 1.42* 1.33* 1.41* 1.41*     Lactic Acid  No lab results found in last 7 days.  MICROBIOLOGY  n/a    IMAGING  CXR 4/21   IMPRESSION:   1. Minimal improvement in perihilar and left retrocardiac opacities,  which may represent atelectasis or pneumonia.  2. Chronic reticular interstitial opacities and fibrosis.

## 2017-04-27 NOTE — PROGRESS NOTES
[facility] Progress Note  Patient: Sophia Johnson   Admission date: 2017  MRN: [id number]  : 1945      ______________________________________________________________________________  ASSESSMENT AND PLAN  Sophia Johnson is a 71 year old woman with history of Idiopathic Pulmonary Fibrosis who is listed for transplant and on 10L of home O2, systolic CHF, afib on coumadin and pulmonary HTN who is admitted for acute on chronic respiratory failure likely 2/2 IPF. Intubated on .      CHANGES TODAY:   -Continue current therapies without proning  -NJ tube placement in AM      NEURO/PSYCH  # Sedation  # Paralytic    -Vecuronium, ketamine, fentanyl, midazolam gtt  -Wean as tolerated      CARDIOVASCULAR  # HFrEF (EF 50-55% on 3/29/17)/Biventricular Dysfunction:  # Increased Right Ventricular Systolic Pressure / IPF:  Nt-pro BNP elevated ~7000. She doesn't seems to be volume overloaded on exam. Per cardiology's last note she is on torsemide 10mg daily but she hasn't taken it in 2 years. Per chart review, her weight is down at 189# and she was 200# a month ago. Elevated Nt-pro BNP likely due to elevated right side heart pressure from worsening IPF. Echo  this admission showed no significant changes from recent echo.   --Will diurese as needed  --Holding home spironolactone and antihypertensives      # Troponin Elevation  Trop elevated at 0.297. Likely due to demand. EKG shows T wave inversions in multiple leads but has been present on previous EKG. She does not have active chest pain and troponin down trended.      # Atrial Fibrillation S/P Cardioversion 2017:   INR therapeutic on admission. Currently in sinus rhythm   --Holding home warfarin right now, will address other anticoagulation in the next few days.       # Hypertension  BP soft on admission.   --Holding home lisinopril and metoprolol      PULMONARY  # Pulmonary Hypertension 2/ IPF:  # Idiopathic Pulmonary Fibrosis:   # Acute on Chronic  Hypoxic Respiratory Failure:   Patient is currently listed for transplant but her PRA is 100% and match would be difficult per Dr. Bowman's last note. She has had increased dyspnea with hypoxia for several days prior to admission. Her current acute on chronic respiratory failure likely 2/2 worsening IPF versus underlying infection. Procal negative. On 4/22, patient with hypoxia with sats in the 70s. Discussed with patient and family regarding goals of care and elected to non-urgently intubate on 4/22. On full strength Flolan. Prone position starting 4/25. Per transplant meeting 4/27, patient will remain on transplant list until 4/28 at 4PM.   --Supine position for now  --Wean vent and Flolan as tolerated  --Dilaudid 0.2mg q2h PRN or morphine 1-2mg q2h PRN  --Treat anxiety as above  --Palliative care consulted, appreciate recommendations   --Holding home prifenidone   --Solumedrol 60mg IV x 5 days (4/21-4/25)  > Goals of care discussion: Patient and family did participate in goals of care discussion. Patient indicated that she was not ready to die. Due to increasing hypoxia, patient was intubated. Patient and family are aware that if patient is intubated for > 7 days then she will no longer be on the transplant list.       GI  No acute issues.       # Nutrition: NJ tube placement in AM      RENAL  #Hypernatremia  -Free water flushes 30 q4h      # Elevated Lactic Acid- Resolved  3.3 on admission that improved to 1.8 after fluids.      HEME/ONC  No acute issues. Hgb stable.       ENDOCRINE  No acute issues.       INFECTIOUS DISEASE  No evidence of infectious source. Patient has been afebrile. UA negative. CXR did not show significant new focal changes. Procal negative.   --Will consider adding abx if she becomes febrile or decompensates       SKIN/MSK  No acute issues.       Prophylaxis:   - DVT: holding anticoagulation, patient on warfarin at home  - GI: n/a   Family: Updated at bedside   Disposition: pending improvement  "in respiratory symptoms, currently requiring bipap and possibly intubation   Code Status: Full Code - will need to continue to address       The patient was examined with Dr. Flores who agrees with the plan.      Dale Esquivel MD  Internal Medicine PGY-1  [phone]  =========================================================================  SUBJECTIVE:    No acute events overnight. Patient tolerated prone position well.    OBJECTIVE:  Blood pressure 100/66, temperature 98.3  F (36.8  C), temperature source Tympanic, resp. rate 22, height 1.753 m (5' 9.02\"), weight 89.7 kg (197 lb 12 oz), SpO2 90 %.    Intake/Output Summary (Last 24 hours)    Gross per 24 hour   Intake           1302 ml   Output              1360 ml   Net           542 ml     Wt Readings from Last 5 Encounters:   04/27/17 89.7 kg (197 lb 12 oz)   03/20/17 90.7 kg (200 lb)   02/28/17 95 kg (209 lb 8 oz)   01/11/17 91.2 kg (201 lb)   01/09/17 91.2 kg (201 lb)     Ventilation Mode: CMV/AC  FiO2 (%): 65 %  Rate Set (breaths/minute): 22 breaths/min  Tidal Volume Set (mL): 550 mL  PEEP (cm H2O): 14 cmH2O  Oxygen Concentration (%): 60 %  Resp: 22  Arterial Line BP: ()/(50-78) 89/53  MAP:  [60 mmHg-96 mmHg] 66 mmHg  BP - Mean:  [77-92] 77     Recent Labs  Lab 04/27/17  0308 04/26/17  1514 04/26/17  0930 04/26/17  0358   PH 7.43 7.45 7.47* 7.48*   PCO2 45 46* 44 45   PO2 77* 57* 54* 103   HCO3 30* 32* 32* 34*   O2PER 60.0 65 65 75     General: NAD, intubated and sedated  HEENT: Sclera anicteric, bipap on  CV: RRR, no murmurs, rubs, gallops or extra heart sounds  Resp: CTAB with bibasilar crackles  Abdomen: soft, nontender, nondistended, no organomegaly appreciated   Extremities: No clubbing or cyanosis, no LE edema bilaterally, peripheral pulses full   Skin: Warm and dry, no jaundice, rash or concerning lesions  Neurological: intubated and sedated    DIAGNOSTIC STUDIES  ROUTINE ICU LABS  CMP    Recent Labs  Lab 04/27/17  0308 04/26/17  1514 " 04/26/17  0358 04/25/17  1440 04/25/17  0308  04/22/17  0350     --  142 140 145*  < > 144   POTASSIUM 4.2 3.9 3.8 3.9 3.9  < > 3.7   CHLORIDE 102  --  103 102 107  < > 108   CO2 29  --  33* 31 31  < > 26   ANIONGAP 8  --  6 6 8  < > 11   *  --  89 161* 99  < > 84   BUN 16  --  18 19 19  < > 13   CR 0.59  --  0.66 0.74 0.76  < > 0.86   GFRESTIMATED >90Non  GFR Calc  --  89 78 75  < > 65   GFRESTBLACK >90African American GFR Calc  --  >90African American GFR Calc >90African American GFR Calc >90African American GFR Calc  < > 79   ANJALI 7.4*  --  7.9* 7.6* 7.7*  < > 8.0*   MAG 2.3 1.9  --  2.1 2.0  < > 2.3   PHOS 2.8 2.7  --  3.5 2.4*  < > 2.4*   PROTTOTAL  --   --   --   --   --   --  6.3*   ALBUMIN  --   --   --   --   --   --  2.4*   BILITOTAL  --   --   --   --   --   --  0.6   ALKPHOS  --   --   --   --   --   --  55   AST  --   --   --   --   --   --  19   ALT  --   --   --   --   --   --  32   < > = values in this interval not displayed.  CBC    Recent Labs  Lab 04/27/17  0308 04/26/17  0358 04/24/17  0351 04/23/17  0438   WBC 13.8* 14.8* 12.6* 10.6   RBC 3.65* 3.70* 3.74* 3.82   HGB 11.4* 11.8 11.5* 11.9   HCT 35.6 36.1 37.0 36.9   MCV 98 98 99 97   MCH 31.2 31.9 30.7 31.2   MCHC 32.0 32.7 31.1* 32.2   RDW 14.0 14.0 14.2 14.2    168 181 188     INR    Recent Labs  Lab 04/27/17  0308 04/26/17  0358 04/25/17  0308 04/24/17  0351   INR 1.42* 1.42* 1.33* 1.41*     Lactic Acid  No lab results found in last 7 days.  MICROBIOLOGY  n/a    IMAGING  CXR 4/21   IMPRESSION:   1. Minimal improvement in perihilar and left retrocardiac opacities,  which may represent atelectasis or pneumonia.  2. Chronic reticular interstitial opacities and fibrosis.

## 2017-04-27 NOTE — PLAN OF CARE
Problem: Goal Outcome Summary  Goal: Goal Outcome Summary  Outcome: No Change  Pt remains proned overnight.  Plan to put on back at 0600.  Pt tolerated prone well, weaned Fi02 to 60%.  No additional vent setting changes.  Pt remains medically paralyzed, repositioned every 2 hours. Afebrile overnight.  Daughter and son in law at bedside overnight and awaiting transplant meeting at 0700.  Discussed restarting Coumadin with resident as patient has hx of AF.  SCD's applied and will will follow up with MD.  VSS.  Will continue to monitor.

## 2017-04-27 NOTE — PROGRESS NOTES
"[facility], [place]   Palliative Care Daily Progress Note          Recommendations, Patient/Family Counseling & Coordination     I met with Jessica's , Martin, and son, Cesar, at the bedside today to follow up. Jessica remains intubated and is being proned for a portion of the day, due to high oxygen requirements.     Martin said that as a family, they are doing okay. They are trying to be patient, and support each other during this \"waiting game.\" Per MICU, the family understands if Jessica requires mechanical ventilation for longer than a week she will be removed from the transplant list. She was intubated on 4/21, so will remain on the transplant list until 4/28 at 4pm. They did not want to discuss this today.     Coping/Support:  -Palliative Care Interdisciplinary team will remain involved for additional support and goals of care.  -Ongoing goals of care conversations are appropriate, in the setting of a critically ill woman with a life-limiting disease and guarded prognosis.        Thank you for the opportunity to continue to participate in the care of this patient and family.  Please feel free to contact on-call palliative provider with any emergent needs.  We can be reached via team pager [phone] (answered 8-4:30 Monday-Friday); after-hours answering service ([phone]).    DAWNA Lamas CNP  Palliative Care Consult Team  Pager: [phone]    25 minutes spent with patient, with >50% counseling and in care coordination.        Assessment      1) Diagnoses & symptoms:        Idiopathic Pulmonary Fibrosis on transplant list  Respiratory Failure now on mechanical ventilator   Anxiety using Lorazepam prn  Constipation     2)  Psychosocial/Spiritual Needs:   Ongoing: Will discuss case during palliative interdisciplinary team rounds and involve LICSW and  as needed.              Interval History:   Intubated 4/21. On transplant list until 4/28 at 4pm. No acute events.  "           Review of Systems:   Unable to complete ROS 2/2 pt being intubated, sedated, and paralyzed.           Medications:   I have reviewed this patient's medication profile and medications given in past 24 hours.           Physical Exam:   Vitals were reviewed  Temp: 98  F (36.7  C) Temp src: Tympanic BP: 91/65   Heart Rate: 111 Resp: 22 SpO2: 91 % O2 Device: Mechanical Ventilator    Constitutional: Seen supine in bed. Ill appearing, but in no acute distress.  Head: ET tube in place.   Pulm: Non-labored breathing, on mechanical ventilation.   Skin: Warm and dry. No concerning rashes or lesions on exposed areas.   Neuropsych: Medically sedated and paralyzed.            Data Reviewed:   ROUTINE ICU LABS (Last four results)  CMP  Recent Labs  Lab 04/27/17  1156 04/27/17  0308 04/26/17  1514 04/26/17  0358 04/25/17  1440 04/25/17  0308  04/22/17  0350   NA  --  140  --  142 140 145*  < > 144   POTASSIUM 4.0 4.2 3.9 3.8 3.9 3.9  < > 3.7   CHLORIDE  --  102  --  103 102 107  < > 108   CO2  --  29  --  33* 31 31  < > 26   ANIONGAP  --  8  --  6 6 8  < > 11   GLC  --  145*  --  89 161* 99  < > 84   BUN  --  16  --  18 19 19  < > 13   CR  --  0.59  --  0.66 0.74 0.76  < > 0.86   GFRESTIMATED  --  >90Non  GFR Calc  --  89 78 75  < > 65   GFRESTBLACK  --  >90African American GFR Calc  --  >90African American GFR Calc >90African American GFR Calc >90African American GFR Calc  < > 79   ANJALI  --  7.4*  --  7.9* 7.6* 7.7*  < > 8.0*   MAG 2.4* 2.3 1.9  --  2.1 2.0  < > 2.3   PHOS 2.5 2.8 2.7  --  3.5 2.4*  < > 2.4*   PROTTOTAL  --   --   --   --   --   --   --  6.3*   ALBUMIN  --   --   --   --   --   --   --  2.4*   BILITOTAL  --   --   --   --   --   --   --  0.6   ALKPHOS  --   --   --   --   --   --   --  55   AST  --   --   --   --   --   --   --  19   ALT  --   --   --   --   --   --   --  32   < > = values in this interval not displayed.  CBC    Recent Labs  Lab 04/27/17  0308 04/26/17  0358 04/24/17  0351  04/23/17  0438   WBC 13.8* 14.8* 12.6* 10.6   RBC 3.65* 3.70* 3.74* 3.82   HGB 11.4* 11.8 11.5* 11.9   HCT 35.6 36.1 37.0 36.9   MCV 98 98 99 97   MCH 31.2 31.9 30.7 31.2   MCHC 32.0 32.7 31.1* 32.2   RDW 14.0 14.0 14.2 14.2    168 181 188     INR    Recent Labs  Lab 04/27/17  0308 04/26/17  0358 04/25/17  0308 04/24/17  0351   INR 1.42* 1.42* 1.33* 1.41*     Arterial Blood Gas    Recent Labs  Lab 04/27/17  1156 04/27/17  0738 04/27/17  0308 04/26/17  1514   PH 7.43 7.47* 7.43 7.45   PCO2 46* 40 45 46*   PO2 59* 64* 77* 57*   HCO3 30* 30* 30* 32*   O2PER 65.0 65 60.0 65

## 2017-04-27 NOTE — PROCEDURES
Bridle Placement:   Reason for bridle placement: Securement of FT per RN   Medicine delivered during procedure: lubricating jelly    Procedure: Successful   Location of top of clip on FT: @ 127 cm marker   Condition of nose/skin at time of bridle placement: Unremarkable   Face to Face time with patient: <5 minutes.  Majo Sal RDN, LD  Pgr: [id number]

## 2017-04-27 NOTE — PROGRESS NOTES
CLINICAL NUTRITION SERVICES - REASSESSMENT NOTE     Nutrition Prescription    RECOMMENDATIONS FOR MDs/PROVIDERS TO ORDER:  -Closely monitor electrolytes and replace as needed  -Free water flush adjustment per MD discretion    Malnutrition Status:    Non-severe malnutrition in the context of acute illness    Recommendations already ordered by Registered Dietitian (RD):  -Modified TF order to reflect new enteral access  -Bridle placement    Future/Additional Recommendations:  -GOC  -Pending GOC, obtain metabolic cart study prior to next reassessment     EVALUATION OF THE PROGRESS TOWARD GOALS   Diet: 4/20-4/22: Regular    Nutrition Support: TF via OGT (placed 4/21) starting 4/22: TwoCal HN @ 45 ml/hr (1080 ml/day) to provide 2160 kcals (30 kcal/kg), 91 g PRO (1.3 g/kg), 756 ml free H2O, 237 g CHO and 5 g Fiber daily per dosing weight 71 kg.    Intake: 5 day TF infusion average =  548 mL, 51% of goal rate (TF held since 4/25 PM r/t initiation of proning) to provide 1102 kcal (16 kcal/kg) and 46 g PRO (0.7 g/kg)     NEW FINDINGS   Resp: 4/21: intubated, OGT (no AXR but MD stated OK for use for TF since past GE jx on chest XR); 4/25 proning initiated, pt currently paralyzed and on Flolan    GI: Radiology to place NJT today under fluoro r/t large hiatal hernia    Per MICU team, family likely to withdraw care in the next few days    MALNUTRITION  % Intake: </= 50% for >/= 5 days (severe)  % Weight Loss: Up to 7.5% in 3 months (non-severe)  Subcutaneous Fat Loss: None observed  Muscle Loss: Thoracic region (clavicle, acromium bone, deltoid, trapezius, pectoral), Upper arm (bicep, tricep), Lower arm  (forearm) and Upper leg (quadricep, hamstring): Mild  Fluid Accumulation/Edema: Does not meet criteria (trace)  Malnutrition Diagnosis: Non-severe malnutrition in the context of acute illness    Previous Goals   Patient to consume % of nutritionally adequate meal trays TID, or the equivalent with  supplements/snacks.  Evaluation: Not met    Previous Nutrition Diagnosis  Inadequate oral intake related to SOB, anorexia with hypermetabolism of lung dz as evidenced by pt lost approx 5.4 kg (6% of previous weight) over the past 3 months.   Evaluation: Declining    CURRENT NUTRITION DIAGNOSIS  Inadequate protein-energy intake related to inadequate enteral infusion (d/t slow adv/proning) as evidenced by 5 day TF infusion average =  548 mL to provide 1102 kcal (16 kcal/kg) and 46 g PRO (0.7 g/kg)      INTERVENTIONS  Implementation  Modified TF order to reflect new enteral access  Collaboration with other providers - MICU rounds  Bridle placement    Goals  Total avg nutritional intake to meet a minimum of 25 kcal/kg and 1.2 g PRO/kg daily (per dosing wt 71 kg).    Monitoring/Evaluation  Progress toward goals will be monitored and evaluated per protocol.    Majo Sal RDN, LD  Pgr: [phone]

## 2017-04-27 NOTE — PLAN OF CARE
Problem: Goal Outcome Summary  Goal: Goal Outcome Summary  Outcome: No Change  [id number]:  NEURO: Sedated on versed, fentanyl, keatmine and paralyzed on vec. TOF 1/4.   CARDIAC: SR 80-90s. In and out of A Tach this afternoon up to 130s, bumps of sedation given, along with checking lytes, lasted about 20 minutes. MAPs stable, no pressors.  RESP: CMV settings. Fio2 70%, PEEP 14. No secretions. Team ok with not proning patient today as ABGs stable and SpO2 > 88%. Will prone over night if decompensates further.    GI: Small bore FT placed at bedside today. TF at goal of 45/hr. OG clamped, minimal residuals. No stool since 4/20.   : Hopper intact.   PLAN: pt comes off the lung tx list tomorrow 4/28 at 1600. Family aware and already discussing end of life cares. Continue to support family and pt. Palliative care following.

## 2017-04-28 NOTE — PLAN OF CARE
Problem: Goal Outcome Summary  Goal: Goal Outcome Summary  Outcome: No Change  D/I/A: Pulmonary fibrosis.   Neuro: Sedated and paralyzed on fentanyl, ketamine, versed, and vecuronium. TOF 1/4 on #4.   CV: NSR with occasional PVCs; HR 80-90s. BP soft, but stable.  Resp: CMV on ventilator FiO2 70%, PEEP 14, and full flolan. LS clear with diminished bases.   /GI: TF at goal rate. NO BM. Hopper patent; 50 mL/hour clear huma output.      P: Lung listed until 1600 today. Family discussing end of life cares. Life source contact information on EPIC sticky note. Continue to support patient and family.

## 2017-04-28 NOTE — PROGRESS NOTES
[facility]  Patient: Sophia Johnson   Admission date: 2017  MRN: [id number]  : 1945      ______________________________________________________________________________  ASSESSMENT AND PLAN  Sophia Johnson is a 71 year old woman with history of Idiopathic Pulmonary Fibrosis who is listed for transplant and on 10L of home O2, systolic CHF, afib on coumadin and pulmonary HTN who is admitted for acute on chronic respiratory failure likely 2/2 IPF. Intubated on .      CHANGES TODAY:   -Continue current therapies without proning      NEURO/PSYCH  # Sedation  # Paralytic    -Vecuronium, ketamine, fentanyl, midazolam gtt  -Wean as tolerated        CARDIOVASCULAR  # HFrEF (EF 50-55% on 3/29/17)/Biventricular Dysfunction:  # Increased Right Ventricular Systolic Pressure 2/2 IPF:  Nt-pro BNP elevated ~7000. She doesn't seems to be volume overloaded on exam. Per cardiology's last note she is on torsemide 10mg daily but she hasn't taken it in 2 years. Per chart review, her weight is down at 189# and she was 200# a month ago. Elevated Nt-pro BNP likely due to elevated right side heart pressure from worsening IPF. Echo  this admission showed no significant changes from recent echo.   --Will diurese as needed  --Holding home spironolactone and antihypertensives      # Troponin Elevation  Trop elevated at 0.297. Likely due to demand. EKG shows T wave inversions in multiple leads but has been present on previous EKG. She does not have active chest pain and troponin down trended.      # Atrial Fibrillation S/P Cardioversion 2017:   INR therapeutic on admission. Currently in sinus rhythm   --Holding home warfarin right now, will address other anticoagulation in the next few days.       # Hypertension  BP soft on admission.   --Holding home lisinopril and metoprolol      PULMONARY  # Pulmonary Hypertension 2/2 IPF:  # Idiopathic Pulmonary Fibrosis:   # Acute on Chronic Hypoxic Respiratory Failure:    Patient is currently listed for transplant but her PRA is 100% and match would be difficult per Dr. Bowman's last note. She has had increased dyspnea with hypoxia for several days prior to admission. Her current acute on chronic respiratory failure likely 2/2 worsening IPF versus underlying infection. Procal negative. On 4/22, patient with hypoxia with sats in the 70s. Discussed with patient and family regarding goals of care and elected to non-urgently intubate on 4/22. On full strength Flolan. Prone position starting 4/25. Per transplant meeting 4/27, patient will remain on transplant list until 4/28 at 4PM.   --Supine position for now  --Wean vent and Flolan as tolerated  --Dilaudid 0.2mg q2h PRN or morphine 1-2mg q2h PRN  --Treat anxiety as above  --Palliative care consulted, appreciate recommendations   --Holding home prifenidone   --Solumedrol 60mg IV x 5 days (4/21-4/25)  > Goals of care discussion: Patient and family did participate in goals of care discussion. Patient indicated that she was not ready to die. Due to increasing hypoxia, patient was intubated. Patient and family are aware that if patient is intubated for > 7 days then she will no longer be on the transplant list.       GI  No acute issues.       # Nutrition: NJ tube feeds      RENAL  #Hypernatremia  -Free water flushes 30 q4h      # Elevated Lactic Acid- Resolved  3.3 on admission that improved to 1.8 after fluids.      HEME/ONC  No acute issues. Hgb stable.       ENDOCRINE  No acute issues.       INFECTIOUS DISEASE  No evidence of infectious source. Patient has been afebrile. UA negative. CXR did not show significant new focal changes. Procal negative.   --Will consider adding abx if she becomes febrile or decompensates       SKIN/MSK  No acute issues.       Prophylaxis:   - DVT: holding anticoagulation, patient on warfarin at home  - GI: n/a   Family: Updated at bedside   Disposition: pending improvement in respiratory symptoms, currently  "requiring bipap and possibly intubation   Code Status: Full Code - will need to continue to address       The patient was examined with Dr. Flores who agrees with the plan.      Dale Esquivel MD  Internal Medicine PGY-1  [phone]  =========================================================================  SUBJECTIVE:    No acute events overnight. Patient tolerated prone position well.    OBJECTIVE:  Blood pressure 91/65, temperature 99.3  F (37.4  C), temperature source Tympanic, resp. rate 22, height 1.753 m (5' 9.02\"), weight 91 kg (200 lb 9.9 oz), SpO2 94 %.    Intake/Output Summary (Last 24 hours)    Gross per 24 hour   Intake           2386 ml   Output              1225 ml   Net           1161 ml     Wt Readings from Last 5 Encounters:   04/28/17 91 kg (200 lb 9.9 oz)   03/20/17 90.7 kg (200 lb)   02/28/17 95 kg (209 lb 8 oz)   01/11/17 91.2 kg (201 lb)   01/09/17 91.2 kg (201 lb)     Ventilation Mode: CMV/AC  FiO2 (%): 70 %  Rate Set (breaths/minute): 22 breaths/min  Tidal Volume Set (mL): 550 mL  PEEP (cm H2O): 14 cmH2O  Oxygen Concentration (%): 70 %  Resp: 22  Arterial Line BP: ()/(46-62) 91/57  MAP:  [61 mmHg-87 mmHg] 66 mmHg     Recent Labs  Lab 04/27/17  1501 04/27/17  1156 04/27/17  0738 04/27/17  0308   PH 7.40 7.43 7.47* 7.43   PCO2 47* 46* 40 45   PO2 72* 59* 64* 77*   HCO3 29* 30* 30* 30*   O2PER 80 65.0 65 60.0     General: NAD, intubated and sedated  HEENT: Sclera anicteric, bipap on  CV: RRR, no murmurs, rubs, gallops or extra heart sounds  Resp: CTAB with bibasilar crackles  Abdomen: soft, nontender, nondistended, no organomegaly appreciated   Extremities: No clubbing or cyanosis, no LE edema bilaterally, peripheral pulses full   Skin: Warm and dry, no jaundice, rash or concerning lesions  Neurological: intubated and sedated    DIAGNOSTIC STUDIES  ROUTINE ICU LABS  CMP    Recent Labs  Lab 04/28/17  0325 04/27/17  1156 04/27/17  0308 04/26/17  1514 04/26/17  0358 04/25/17  1440  " 04/22/17  0350     --  140  --  142 140  < > 144   POTASSIUM 4.0 4.0 4.2 3.9 3.8 3.9  < > 3.7   CHLORIDE 105  --  102  --  103 102  < > 108   CO2 32  --  29  --  33* 31  < > 26   ANIONGAP 6  --  8  --  6 6  < > 11   *  --  145*  --  89 161*  < > 84   BUN 21  --  16  --  18 19  < > 13   CR 0.58  --  0.59  --  0.66 0.74  < > 0.86   GFRESTIMATED >90Non  GFR Calc  --  >90Non  GFR Calc  --  89 78  < > 65   GFRESTBLACK >90African American GFR Calc  --  >90African American GFR Calc  --  >90African American GFR Calc >90African American GFR Calc  < > 79   ANJALI 7.6*  --  7.4*  --  7.9* 7.6*  < > 8.0*   MAG  --  2.4* 2.3 1.9  --  2.1  < > 2.3   PHOS  --  2.5 2.8 2.7  --  3.5  < > 2.4*   PROTTOTAL  --   --   --   --   --   --   --  6.3*   ALBUMIN  --   --   --   --   --   --   --  2.4*   BILITOTAL  --   --   --   --   --   --   --  0.6   ALKPHOS  --   --   --   --   --   --   --  55   AST  --   --   --   --   --   --   --  19   ALT  --   --   --   --   --   --   --  32   < > = values in this interval not displayed.  CBC    Recent Labs  Lab 04/28/17 0325 04/27/17 0308 04/26/17 0358 04/24/17 0351   WBC 14.0* 13.8* 14.8* 12.6*   RBC 3.30* 3.65* 3.70* 3.74*   HGB 10.4* 11.4* 11.8 11.5*   HCT 32.4* 35.6 36.1 37.0   MCV 98 98 98 99   MCH 31.5 31.2 31.9 30.7   MCHC 32.1 32.0 32.7 31.1*   RDW 14.1 14.0 14.0 14.2    166 168 181     INR    Recent Labs  Lab 04/28/17  0325 04/27/17  0308 04/26/17  0358 04/25/17  0308   INR 1.31* 1.42* 1.42* 1.33*     Lactic Acid  No lab results found in last 7 days.  MICROBIOLOGY  n/a    IMAGING  CXR 4/21   IMPRESSION:   1. Minimal improvement in perihilar and left retrocardiac opacities,  which may represent atelectasis or pneumonia.  2. Chronic reticular interstitial opacities and fibrosis.

## 2017-04-28 NOTE — TELEPHONE ENCOUNTER
Received referral to [facility].  had tried reaching out to patient on 4/18 to see if we could set up an assessment appointment.  was unable to reach and left message to call back when available. It was brought to our attention that she went into the hospital on 4/18 and has been admitted and currently still is.     We can try to reach back out to patient if discharged.

## 2017-04-28 NOTE — PROGRESS NOTES
Sophia was put on a ventilator a week ago.  Prior to being placed on the vent, plan was discussed with family that if ventilator support was required the plan would be for Jessica to remain on the lung transplant list for a week.  There have been no donor offers for Jessica during this past month even with her high LAS score, likely due to her high PRA.  This was confirmed with Jessica's  Martin earlier today.  Received call from Dr. Bowman who will see Jessica and her family now, and will confirm with family that Jessica has been removed from lung transplant list.  Removed from [facility] wait list with reason:  too sick for transplant.

## 2017-04-28 NOTE — PROGRESS NOTES
PALLIATIVE CONSULT SERVICE  SPIRITUAL ASSESSMENT Progress Note  [facility] 4C MICU    Short interaction with some of Pat's family. Briefly talked about Pat's circumstances and their hope to connect with a  this evening for emotional and spiritual support. I will follow-up 2x/week as Pat receives ongoing support from the inpatient palliative consult service.    Shmuel Blanchard M.Div., [facility]  Palliative Consult Service   Pager [phone]

## 2017-04-28 NOTE — PLAN OF CARE
Problem: Goal Outcome Summary  Goal: Goal Outcome Summary  D/I:  Family decided to make patient comfort cares.  Vecuronium d/c'd at 1520.  Currently working on keeping patient comfortable with ketamine, fentanyl and versed gtts.  Giving PRN boluses of all three.  Lidocaine IV given x1 for cough suppressant.  Have orders for morphine and ativan PRN.  A/P:  Keep patient comfortable.  Plan to discontinue vent at 2000.  Provide support for family.

## 2017-04-29 NOTE — SIGNIFICANT EVENT
MD DEATH PRONOUNCEMENT    Called to pronounce Sophia Johnson dead.    Physical Exam: Unresponsive to noxious stimuli, Spontaneous respirations absent, Breath sounds absent, Carotid pulse absent, Heart sounds absent and Pupillary light reflex absent    Patient was pronounced dead at 19:40, April 28, 2017.    Body disposition: Autopsy was discussed with family member:  Family members in person.  Permission for autopsy was declined.    Jose Gerard MD

## 2017-04-29 NOTE — DISCHARGE SUMMARY
MICU Death Summary   Sophia Johnson MRN: [id number]  Age: 71 year old, : 1945  Primary care provider: Lesley Christianson       Date of Admission:  2017  Date of Discharge:  2017  Admitting Physician:  David Morris Perlman, MD  Discharge Physician:  Dale Esquivel MD   Discharging Service:  Intensive Care    Admission Diagnoses:   Acute hypoxic respiratory failure     Cause of Death :   Acute hypoxic respiratory failure          Hospitalization Summary     Sophia Johnson is a 71 year old woman with history of Idiopathic Pulmonary Fibrosis, systolic CHF, afib on coumadin and pulmonary HTN who is admitted for acute on chronic respiratory failure 2/2 IPF.  During the course of the hospitalization, patient's oxygen requirement gradually increased and resulted in intubation on . After intubation, patient was started on full strength Flolan and eventually required proning in order to compensate for increased oxygen requirement.    Patient's transplant status was discussed during Transplantation meeting on  and deemed not to be a transplant candidate after 7 days of intubation. After discussing with family on , motion to make patient comfort cares on  at 7PM. Patient passed away shortly afterwards.     Consultants     Cardiology  Palliative care    Procedures     Arterial line    Physical Exam       Physical Exam: Unresponsive to noxious stimuli, no spontaneous respirations. Breath and heart sounds absent, no pupillary light reflex or corneal reflex. No pulses present.      Patient was pronounced dead at: 2017    Active Problems:   Past Medical History:   Diagnosis Date     Atrial fibrillation (H) 2015     Atypical squamous cell changes of undetermined significance (ASCUS) on cervical cytology with positive high risk human papilloma virus (HPV) age 30 & following     Chronic systolic heart failure (H)      Coughing      Fall against object 2005    Fell 35 feet     IPF  (idiopathic pulmonary fibrosis) (H)     chest CT  showed pulm fibrosis but not diagnostic of IPF; VATS R lung bx 2013 +UIP; RAINTOMÁS simutuzumab study until ; pirfenidone started .     LBP (low back pain)      On home oxygen therapy     uses when walking >3 minutes     Pneumonia     interstitial lung disease      Vitamin D deficiency        Please consider an autopsy if any of the following exist:   NO  Unexpected or unexplained death during or following any dental, medical, or surgical diagnostic treatment procedures.    NO  Death of mother at or up to seven days after delivery.    NO  All  and pediatric deaths.    NO  Death where the cause is sufficiently obscure to delay completion of the death certificate.    NO  Deaths in which autopsy would confirm a suspected illness/condition that would affect surviving family members or recipients of transplanted organs.      The following deaths must be reported to the 's Office:   NO  A death that may be due entirely or in part to any factors other than natural disease (recent surgery, recent trauma, suspected abuse/neglect).    NO  A death that may be an accident, suicide, or homicide.    NO  Any sudden, unexpected death in which there is no prior history of significant heart disease or any other condition associated with sudden death.    NO  Any death which is apparently due to natural causes but in which the  does not have a personal physician familiar with the patient s medical history, social, or environmental situation or the circumstances of the terminal event.    NO  Any death apparently due to Sudden Infant Death Syndrome.    NO  Deaths that occur during, in association with, or as consequences of a diagnostic, therapeutic, or anesthetic procedure.    NO  Any death in which a fracture of a major bone has occurred within the past (6) six months.    NO  Any death in which the  was an inmate of a public institution or  was in the custody of Law Enforcement personnel.      Disposition: Family member/POA declined an autopsy.       Dale Esquivel MD  Internal Medicine PGY-1  [phone]

## 2017-04-29 NOTE — SUMMARY OF CARE
Pt extubated to comfort cares at 1900. Family present for patients time of death at 1945. Life [facility] contacted around 2030. Family left bedside with patient belongings around 2030. All questions answered, phone number provided incase needed. Security contacted at 2220 for body removal.

## 2017-05-01 ENCOUNTER — POST MORTEM DOCUMENTATION (OUTPATIENT)
Dept: TRANSPLANT | Facility: CLINIC | Age: 72
End: 2017-05-01

## 2017-05-01 ENCOUNTER — ANTICOAGULATION THERAPY VISIT (OUTPATIENT)
Dept: ANTICOAGULATION | Facility: CLINIC | Age: 72
End: 2017-05-01

## 2017-05-01 DIAGNOSIS — I48.91 ATRIAL FIBRILLATION, UNSPECIFIED TYPE (H): ICD-10-CM

## 2017-05-01 DIAGNOSIS — Z79.01 LONG-TERM (CURRENT) USE OF ANTICOAGULANTS: ICD-10-CM

## 2017-05-01 NOTE — PROGRESS NOTES
Received notification of patient's death from [facility].  Place of death was reported as [facility], station 4C.  The Transplant Office has been notified that patient is .  The Post Mortem Encounter has been completed.  Notifications have been sent to the Immunology lab and thoracic transplant office..   Instructions have been sent to send a sympathy card to the family.

## 2018-04-17 ENCOUNTER — TELEPHONE (OUTPATIENT)
Dept: CARDIOLOGY | Facility: CLINIC | Age: 73
End: 2018-04-17

## 2018-04-17 NOTE — TELEPHONE ENCOUNTER
Received call from Treasure with [facility] regarding the need for Dr Martin to sign INR orders from 2017. Orders have been faxed to clinic on 03/08/2018 and then again last week. Treasure stated this is urgent and needs to be completed asap. Can be signed by another provider if able. Treasure informed that this would be sent to Dr Martin's nurse to be reviewed, but that he is also not in clinic today. Treasure can be contacted at [phone] for further inquiries.    Eugenia Aldridge LPN

## (undated) RX ORDER — LIDOCAINE 40 MG/G
CREAM TOPICAL
Status: DISPENSED
Start: 2017-01-01